# Patient Record
Sex: MALE | Race: WHITE | NOT HISPANIC OR LATINO
[De-identification: names, ages, dates, MRNs, and addresses within clinical notes are randomized per-mention and may not be internally consistent; named-entity substitution may affect disease eponyms.]

---

## 2022-01-01 ENCOUNTER — APPOINTMENT (OUTPATIENT)
Dept: INTERVENTIONAL RADIOLOGY/VASCULAR | Facility: CLINIC | Age: 67
End: 2022-01-01
Payer: MEDICARE

## 2022-01-01 ENCOUNTER — TRANSCRIPTION ENCOUNTER (OUTPATIENT)
Age: 67
End: 2022-01-01

## 2022-01-01 ENCOUNTER — RESULT REVIEW (OUTPATIENT)
Age: 67
End: 2022-01-01

## 2022-01-01 ENCOUNTER — APPOINTMENT (OUTPATIENT)
Dept: MRI IMAGING | Facility: CLINIC | Age: 67
End: 2022-01-01
Payer: MEDICARE

## 2022-01-01 ENCOUNTER — APPOINTMENT (OUTPATIENT)
Dept: NUCLEAR MEDICINE | Facility: IMAGING CENTER | Age: 67
End: 2022-01-01
Payer: MEDICARE

## 2022-01-01 ENCOUNTER — OUTPATIENT (OUTPATIENT)
Dept: OUTPATIENT SERVICES | Facility: HOSPITAL | Age: 67
LOS: 1 days | Discharge: HOME | End: 2022-01-01

## 2022-01-01 ENCOUNTER — NON-APPOINTMENT (OUTPATIENT)
Age: 67
End: 2022-01-01

## 2022-01-01 ENCOUNTER — OUTPATIENT (OUTPATIENT)
Dept: OUTPATIENT SERVICES | Facility: HOSPITAL | Age: 67
LOS: 1 days | End: 2022-01-01
Payer: MEDICARE

## 2022-01-01 ENCOUNTER — APPOINTMENT (OUTPATIENT)
Dept: HEMATOLOGY ONCOLOGY | Facility: CLINIC | Age: 67
End: 2022-01-01

## 2022-01-01 ENCOUNTER — APPOINTMENT (OUTPATIENT)
Dept: THORACIC SURGERY | Facility: CLINIC | Age: 67
End: 2022-01-01
Payer: MEDICARE

## 2022-01-01 ENCOUNTER — APPOINTMENT (OUTPATIENT)
Dept: MRI IMAGING | Facility: CLINIC | Age: 67
End: 2022-01-01

## 2022-01-01 ENCOUNTER — INPATIENT (INPATIENT)
Facility: HOSPITAL | Age: 67
LOS: 1 days | Discharge: HOME | End: 2022-07-20
Attending: INTERNAL MEDICINE | Admitting: INTERNAL MEDICINE

## 2022-01-01 ENCOUNTER — EMERGENCY (EMERGENCY)
Facility: HOSPITAL | Age: 67
LOS: 1 days | Discharge: HOME | End: 2022-01-01
Attending: EMERGENCY MEDICINE | Admitting: EMERGENCY MEDICINE
Payer: MEDICARE

## 2022-01-01 ENCOUNTER — APPOINTMENT (OUTPATIENT)
Dept: HEMATOLOGY ONCOLOGY | Facility: CLINIC | Age: 67
End: 2022-01-01
Payer: MEDICARE

## 2022-01-01 ENCOUNTER — INPATIENT (INPATIENT)
Facility: HOSPITAL | Age: 67
LOS: 6 days | Discharge: EXPIRED | End: 2022-10-18
Attending: STUDENT IN AN ORGANIZED HEALTH CARE EDUCATION/TRAINING PROGRAM | Admitting: STUDENT IN AN ORGANIZED HEALTH CARE EDUCATION/TRAINING PROGRAM

## 2022-01-01 VITALS
SYSTOLIC BLOOD PRESSURE: 104 MMHG | WEIGHT: 160 LBS | RESPIRATION RATE: 17 BRPM | BODY MASS INDEX: 28.35 KG/M2 | HEART RATE: 67 BPM | HEIGHT: 63 IN | DIASTOLIC BLOOD PRESSURE: 67 MMHG | OXYGEN SATURATION: 97 %

## 2022-01-01 VITALS
SYSTOLIC BLOOD PRESSURE: 148 MMHG | HEART RATE: 68 BPM | OXYGEN SATURATION: 96 % | DIASTOLIC BLOOD PRESSURE: 95 MMHG | RESPIRATION RATE: 18 BRPM | TEMPERATURE: 98 F

## 2022-01-01 VITALS
DIASTOLIC BLOOD PRESSURE: 70 MMHG | SYSTOLIC BLOOD PRESSURE: 111 MMHG | RESPIRATION RATE: 18 BRPM | OXYGEN SATURATION: 92 %

## 2022-01-01 VITALS
SYSTOLIC BLOOD PRESSURE: 111 MMHG | HEIGHT: 63 IN | TEMPERATURE: 99 F | OXYGEN SATURATION: 99 % | HEART RATE: 66 BPM | DIASTOLIC BLOOD PRESSURE: 68 MMHG | RESPIRATION RATE: 16 BRPM

## 2022-01-01 VITALS
OXYGEN SATURATION: 95 % | HEIGHT: 61.02 IN | SYSTOLIC BLOOD PRESSURE: 101 MMHG | HEART RATE: 55 BPM | TEMPERATURE: 98 F | DIASTOLIC BLOOD PRESSURE: 66 MMHG

## 2022-01-01 VITALS
HEART RATE: 102 BPM | HEIGHT: 63 IN | DIASTOLIC BLOOD PRESSURE: 80 MMHG | OXYGEN SATURATION: 97 % | WEIGHT: 160 LBS | BODY MASS INDEX: 28.35 KG/M2 | SYSTOLIC BLOOD PRESSURE: 124 MMHG

## 2022-01-01 VITALS
OXYGEN SATURATION: 94 % | RESPIRATION RATE: 16 BRPM | HEART RATE: 66 BPM | TEMPERATURE: 97.7 F | HEIGHT: 61.02 IN | DIASTOLIC BLOOD PRESSURE: 73 MMHG | WEIGHT: 155.87 LBS | BODY MASS INDEX: 29.43 KG/M2 | SYSTOLIC BLOOD PRESSURE: 124 MMHG

## 2022-01-01 VITALS
RESPIRATION RATE: 17 BRPM | OXYGEN SATURATION: 97 % | DIASTOLIC BLOOD PRESSURE: 82 MMHG | SYSTOLIC BLOOD PRESSURE: 133 MMHG | TEMPERATURE: 98 F | HEART RATE: 64 BPM

## 2022-01-01 VITALS
SYSTOLIC BLOOD PRESSURE: 127 MMHG | RESPIRATION RATE: 18 BRPM | OXYGEN SATURATION: 99 % | HEART RATE: 65 BPM | DIASTOLIC BLOOD PRESSURE: 64 MMHG | TEMPERATURE: 98 F

## 2022-01-01 DIAGNOSIS — R07.89 OTHER CHEST PAIN: ICD-10-CM

## 2022-01-01 DIAGNOSIS — M10.9 GOUT, UNSPECIFIED: ICD-10-CM

## 2022-01-01 DIAGNOSIS — I20.0 UNSTABLE ANGINA: ICD-10-CM

## 2022-01-01 DIAGNOSIS — F17.200 NICOTINE DEPENDENCE, UNSPECIFIED, UNCOMPLICATED: ICD-10-CM

## 2022-01-01 DIAGNOSIS — E87.1 HYPO-OSMOLALITY AND HYPONATREMIA: ICD-10-CM

## 2022-01-01 DIAGNOSIS — M89.9 DISORDER OF BONE, UNSPECIFIED: ICD-10-CM

## 2022-01-01 DIAGNOSIS — M54.9 DORSALGIA, UNSPECIFIED: ICD-10-CM

## 2022-01-01 DIAGNOSIS — R53.1 WEAKNESS: ICD-10-CM

## 2022-01-01 DIAGNOSIS — R55 SYNCOPE AND COLLAPSE: ICD-10-CM

## 2022-01-01 DIAGNOSIS — S24.119A: ICD-10-CM

## 2022-01-01 DIAGNOSIS — R52 PAIN, UNSPECIFIED: ICD-10-CM

## 2022-01-01 DIAGNOSIS — J44.9 CHRONIC OBSTRUCTIVE PULMONARY DISEASE, UNSPECIFIED: ICD-10-CM

## 2022-01-01 DIAGNOSIS — I25.10 ATHEROSCLEROTIC HEART DISEASE OF NATIVE CORONARY ARTERY WITHOUT ANGINA PECTORIS: ICD-10-CM

## 2022-01-01 DIAGNOSIS — C79.51 SECONDARY MALIGNANT NEOPLASM OF BONE: ICD-10-CM

## 2022-01-01 DIAGNOSIS — R39.198 OTHER DIFFICULTIES WITH MICTURITION: ICD-10-CM

## 2022-01-01 DIAGNOSIS — E03.9 HYPOTHYROIDISM, UNSPECIFIED: ICD-10-CM

## 2022-01-01 DIAGNOSIS — M54.6 PAIN IN THORACIC SPINE: ICD-10-CM

## 2022-01-01 DIAGNOSIS — Z51.5 ENCOUNTER FOR PALLIATIVE CARE: ICD-10-CM

## 2022-01-01 DIAGNOSIS — Z80.1 FAMILY HISTORY OF MALIGNANT NEOPLASM OF TRACHEA, BRONCHUS AND LUNG: ICD-10-CM

## 2022-01-01 DIAGNOSIS — Z29.9 ENCOUNTER FOR PROPHYLACTIC MEASURES, UNSPECIFIED: ICD-10-CM

## 2022-01-01 DIAGNOSIS — Z02.9 ENCOUNTER FOR ADMINISTRATIVE EXAMINATIONS, UNSPECIFIED: ICD-10-CM

## 2022-01-01 DIAGNOSIS — E87.5 HYPERKALEMIA: ICD-10-CM

## 2022-01-01 DIAGNOSIS — E11.9 TYPE 2 DIABETES MELLITUS WITHOUT COMPLICATIONS: ICD-10-CM

## 2022-01-01 DIAGNOSIS — C90.00 MULTIPLE MYELOMA NOT HAVING ACHIEVED REMISSION: ICD-10-CM

## 2022-01-01 DIAGNOSIS — Z00.8 ENCOUNTER FOR OTHER GENERAL EXAMINATION: ICD-10-CM

## 2022-01-01 DIAGNOSIS — Z87.891 PERSONAL HISTORY OF NICOTINE DEPENDENCE: ICD-10-CM
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
## 2022-01-01 DIAGNOSIS — R07.9 CHEST PAIN, UNSPECIFIED: ICD-10-CM

## 2022-01-01 DIAGNOSIS — F41.9 ANXIETY DISORDER, UNSPECIFIED: ICD-10-CM

## 2022-01-01 DIAGNOSIS — D64.9 ANEMIA, UNSPECIFIED: ICD-10-CM

## 2022-01-01 LAB
% ALBUMIN: 34 % — SIGNIFICANT CHANGE UP
% ALPHA 1: 3.3 % — SIGNIFICANT CHANGE UP
% ALPHA 2: 14.6 % — SIGNIFICANT CHANGE UP
% BETA: 15.1 % — SIGNIFICANT CHANGE UP
% GAMMA: 33 % — SIGNIFICANT CHANGE UP
% M SPIKE: 29.5 % — SIGNIFICANT CHANGE UP
A1C WITH ESTIMATED AVERAGE GLUCOSE RESULT: 6.4 % — HIGH (ref 4–5.6)
A1C WITH ESTIMATED AVERAGE GLUCOSE RESULT: 6.4 % — HIGH (ref 4–5.6)
A1C WITH ESTIMATED AVERAGE GLUCOSE RESULT: 6.5 % — HIGH (ref 4–5.6)
ALBUMIN MFR SERPL ELPH: 41.2 %
ALBUMIN SERPL ELPH-MCNC: 2.75 G/DL — LOW (ref 3.3–4.4)
ALBUMIN SERPL ELPH-MCNC: 2.8 G/DL — LOW (ref 3.3–5)
ALBUMIN SERPL ELPH-MCNC: 3 G/DL — LOW (ref 3.3–5)
ALBUMIN SERPL ELPH-MCNC: 3.1 G/DL — LOW (ref 3.3–5)
ALBUMIN SERPL ELPH-MCNC: 3.3 G/DL — SIGNIFICANT CHANGE UP (ref 3.3–5)
ALBUMIN SERPL ELPH-MCNC: 3.3 G/DL — SIGNIFICANT CHANGE UP (ref 3.3–5)
ALBUMIN SERPL ELPH-MCNC: 3.4 G/DL — SIGNIFICANT CHANGE UP (ref 3.3–5)
ALBUMIN SERPL ELPH-MCNC: 3.4 G/DL — SIGNIFICANT CHANGE UP (ref 3.3–5)
ALBUMIN SERPL ELPH-MCNC: 3.6 G/DL — SIGNIFICANT CHANGE UP (ref 3.3–5)
ALBUMIN SERPL ELPH-MCNC: 3.6 G/DL — SIGNIFICANT CHANGE UP (ref 3.3–5)
ALBUMIN SERPL ELPH-MCNC: 4 G/DL
ALBUMIN SERPL-MCNC: 3.5 G/DL
ALBUMIN/GLOB SERPL ELPH: 0.5 RATIO — SIGNIFICANT CHANGE UP
ALBUMIN/GLOB SERPL: 0.7 RATIO
ALP BLD-CCNC: 70 U/L
ALP SERPL-CCNC: 54 U/L — SIGNIFICANT CHANGE UP (ref 40–120)
ALP SERPL-CCNC: 55 U/L — SIGNIFICANT CHANGE UP (ref 40–120)
ALP SERPL-CCNC: 55 U/L — SIGNIFICANT CHANGE UP (ref 40–120)
ALP SERPL-CCNC: 57 U/L — SIGNIFICANT CHANGE UP (ref 40–120)
ALP SERPL-CCNC: 58 U/L — SIGNIFICANT CHANGE UP (ref 40–120)
ALP SERPL-CCNC: 58 U/L — SIGNIFICANT CHANGE UP (ref 40–120)
ALP SERPL-CCNC: 61 U/L — SIGNIFICANT CHANGE UP (ref 40–120)
ALP SERPL-CCNC: 64 U/L — SIGNIFICANT CHANGE UP (ref 40–120)
ALP SERPL-CCNC: 65 U/L — SIGNIFICANT CHANGE UP (ref 40–120)
ALPHA1 GLOB MFR SERPL ELPH: 3.6 %
ALPHA1 GLOB SERPL ELPH-MCNC: 0.27 G/DL — SIGNIFICANT CHANGE UP (ref 0.1–0.3)
ALPHA1 GLOB SERPL ELPH-MCNC: 0.3 G/DL
ALPHA2 GLOB MFR SERPL ELPH: 10.6 %
ALPHA2 GLOB SERPL ELPH-MCNC: 0.9 G/DL
ALPHA2 GLOB SERPL ELPH-MCNC: 1.2 G/DL — HIGH (ref 0.6–1)
ALT FLD-CCNC: 15 U/L — SIGNIFICANT CHANGE UP (ref 4–41)
ALT FLD-CCNC: 15 U/L — SIGNIFICANT CHANGE UP (ref 4–41)
ALT FLD-CCNC: 16 U/L — SIGNIFICANT CHANGE UP (ref 4–41)
ALT FLD-CCNC: 18 U/L — SIGNIFICANT CHANGE UP (ref 4–41)
ALT FLD-CCNC: 19 U/L — SIGNIFICANT CHANGE UP (ref 4–41)
ALT FLD-CCNC: 25 U/L — SIGNIFICANT CHANGE UP (ref 4–41)
ALT FLD-CCNC: 40 U/L — SIGNIFICANT CHANGE UP (ref 4–41)
ALT FLD-CCNC: 60 U/L — HIGH (ref 4–41)
ALT FLD-CCNC: 655 U/L — HIGH (ref 4–41)
ALT SERPL-CCNC: 24 U/L
ANION GAP SERPL CALC-SCNC: 10 MMOL/L — SIGNIFICANT CHANGE UP (ref 7–14)
ANION GAP SERPL CALC-SCNC: 10 MMOL/L — SIGNIFICANT CHANGE UP (ref 7–14)
ANION GAP SERPL CALC-SCNC: 11 MMOL/L — SIGNIFICANT CHANGE UP (ref 7–14)
ANION GAP SERPL CALC-SCNC: 12 MMOL/L — SIGNIFICANT CHANGE UP (ref 7–14)
ANION GAP SERPL CALC-SCNC: 12 MMOL/L — SIGNIFICANT CHANGE UP (ref 7–14)
ANION GAP SERPL CALC-SCNC: 13 MMOL/L — SIGNIFICANT CHANGE UP (ref 7–14)
ANION GAP SERPL CALC-SCNC: 14 MMOL/L — SIGNIFICANT CHANGE UP (ref 7–14)
ANION GAP SERPL CALC-SCNC: 17 MMOL/L
ANION GAP SERPL CALC-SCNC: 20 MMOL/L — HIGH (ref 7–14)
ANION GAP SERPL CALC-SCNC: 7 MMOL/L — SIGNIFICANT CHANGE UP (ref 7–14)
ANION GAP SERPL CALC-SCNC: 9 MMOL/L — SIGNIFICANT CHANGE UP (ref 7–14)
ANION GAP SERPL CALC-SCNC: 9 MMOL/L — SIGNIFICANT CHANGE UP (ref 7–14)
ANISOCYTOSIS BLD QL: SLIGHT — SIGNIFICANT CHANGE UP
APPEARANCE UR: CLEAR — SIGNIFICANT CHANGE UP
APPEARANCE UR: CLEAR — SIGNIFICANT CHANGE UP
APTT BLD: 160.6 SEC — SIGNIFICANT CHANGE UP (ref 27–36.3)
APTT BLD: 28.4 SEC — SIGNIFICANT CHANGE UP (ref 27–36.3)
APTT BLD: 29.2 SEC — SIGNIFICANT CHANGE UP (ref 27–36.3)
APTT BLD: 32.4 SEC
APTT BLD: 32.4 SEC — SIGNIFICANT CHANGE UP (ref 27–36.3)
AST SERPL-CCNC: 108 U/L — HIGH (ref 4–40)
AST SERPL-CCNC: 16 U/L — SIGNIFICANT CHANGE UP (ref 4–40)
AST SERPL-CCNC: 16 U/L — SIGNIFICANT CHANGE UP (ref 4–40)
AST SERPL-CCNC: 18 U/L — SIGNIFICANT CHANGE UP (ref 4–40)
AST SERPL-CCNC: 19 U/L — SIGNIFICANT CHANGE UP (ref 4–40)
AST SERPL-CCNC: 20 U/L — SIGNIFICANT CHANGE UP (ref 4–40)
AST SERPL-CCNC: 22 U/L — SIGNIFICANT CHANGE UP (ref 4–40)
AST SERPL-CCNC: 22 U/L — SIGNIFICANT CHANGE UP (ref 4–40)
AST SERPL-CCNC: 23 U/L
AST SERPL-CCNC: 28 U/L — SIGNIFICANT CHANGE UP (ref 4–40)
AST SERPL-CCNC: 516 U/L — HIGH (ref 4–40)
AST SERPL-CCNC: 58 U/L — HIGH (ref 4–40)
B PERT DNA SPEC QL NAA+PROBE: SIGNIFICANT CHANGE UP
B PERT+PARAPERT DNA PNL SPEC NAA+PROBE: SIGNIFICANT CHANGE UP
B-GLOBULIN MFR SERPL ELPH: 13.4 %
B-GLOBULIN SERPL ELPH-MCNC: 1.2 G/DL
B-GLOBULIN SERPL ELPH-MCNC: 1.22 G/DL — HIGH (ref 0.6–1.1)
B2 MICROGLOB SERPL-MCNC: 3.7 MG/L
BACTERIA # UR AUTO: NEGATIVE — SIGNIFICANT CHANGE UP
BACTERIA # UR AUTO: NEGATIVE — SIGNIFICANT CHANGE UP
BASE EXCESS BLDA CALC-SCNC: -9 MMOL/L — LOW (ref -2–3)
BASOPHILS # BLD AUTO: 0 K/UL — SIGNIFICANT CHANGE UP (ref 0–0.2)
BASOPHILS # BLD AUTO: 0.03 K/UL — SIGNIFICANT CHANGE UP (ref 0–0.2)
BASOPHILS # BLD AUTO: 0.04 K/UL — SIGNIFICANT CHANGE UP (ref 0–0.2)
BASOPHILS NFR BLD AUTO: 0 % — SIGNIFICANT CHANGE UP (ref 0–2)
BASOPHILS NFR BLD AUTO: 0.2 % — SIGNIFICANT CHANGE UP (ref 0–2)
BASOPHILS NFR BLD AUTO: 0.3 % — SIGNIFICANT CHANGE UP (ref 0–2)
BASOPHILS NFR BLD AUTO: 0.3 % — SIGNIFICANT CHANGE UP (ref 0–2)
BASOPHILS NFR BLD AUTO: 0.4 % — SIGNIFICANT CHANGE UP (ref 0–2)
BASOPHILS NFR BLD AUTO: 0.4 % — SIGNIFICANT CHANGE UP (ref 0–2)
BILIRUB SERPL-MCNC: 0.3 MG/DL
BILIRUB SERPL-MCNC: 0.3 MG/DL — SIGNIFICANT CHANGE UP (ref 0.2–1.2)
BILIRUB SERPL-MCNC: 0.4 MG/DL — SIGNIFICANT CHANGE UP (ref 0.2–1.2)
BILIRUB SERPL-MCNC: 0.5 MG/DL — SIGNIFICANT CHANGE UP (ref 0.2–1.2)
BILIRUB SERPL-MCNC: 0.6 MG/DL — SIGNIFICANT CHANGE UP (ref 0.2–1.2)
BILIRUB UR-MCNC: NEGATIVE — SIGNIFICANT CHANGE UP
BILIRUB UR-MCNC: NEGATIVE — SIGNIFICANT CHANGE UP
BLD GP AB SCN SERPL QL: NEGATIVE — SIGNIFICANT CHANGE UP
BORDETELLA PARAPERTUSSIS (RAPRVP): SIGNIFICANT CHANGE UP
BUN SERPL-MCNC: 19 MG/DL — SIGNIFICANT CHANGE UP (ref 7–23)
BUN SERPL-MCNC: 23 MG/DL — SIGNIFICANT CHANGE UP (ref 7–23)
BUN SERPL-MCNC: 30 MG/DL — HIGH (ref 7–23)
BUN SERPL-MCNC: 32 MG/DL
BUN SERPL-MCNC: 32 MG/DL — HIGH (ref 7–23)
BUN SERPL-MCNC: 37 MG/DL — HIGH (ref 7–23)
BUN SERPL-MCNC: 37 MG/DL — HIGH (ref 7–23)
BUN SERPL-MCNC: 39 MG/DL — HIGH (ref 7–23)
BUN SERPL-MCNC: 43 MG/DL — HIGH (ref 7–23)
BUN SERPL-MCNC: 43 MG/DL — HIGH (ref 7–23)
BUN SERPL-MCNC: 45 MG/DL — HIGH (ref 7–23)
BUN SERPL-MCNC: 47 MG/DL — HIGH (ref 7–23)
BUN SERPL-MCNC: 47 MG/DL — HIGH (ref 7–23)
C PNEUM DNA SPEC QL NAA+PROBE: SIGNIFICANT CHANGE UP
CALCIUM SERPL-MCNC: 8.2 MG/DL — LOW (ref 8.4–10.5)
CALCIUM SERPL-MCNC: 8.4 MG/DL — SIGNIFICANT CHANGE UP (ref 8.4–10.5)
CALCIUM SERPL-MCNC: 8.5 MG/DL — SIGNIFICANT CHANGE UP (ref 8.4–10.5)
CALCIUM SERPL-MCNC: 8.8 MG/DL — SIGNIFICANT CHANGE UP (ref 8.4–10.5)
CALCIUM SERPL-MCNC: 8.9 MG/DL — SIGNIFICANT CHANGE UP (ref 8.4–10.5)
CALCIUM SERPL-MCNC: 9.1 MG/DL — SIGNIFICANT CHANGE UP (ref 8.4–10.5)
CALCIUM SERPL-MCNC: 9.2 MG/DL — SIGNIFICANT CHANGE UP (ref 8.4–10.5)
CALCIUM SERPL-MCNC: 9.3 MG/DL — SIGNIFICANT CHANGE UP (ref 8.4–10.5)
CALCIUM SERPL-MCNC: 9.4 MG/DL — SIGNIFICANT CHANGE UP (ref 8.4–10.5)
CALCIUM SERPL-MCNC: 9.5 MG/DL — SIGNIFICANT CHANGE UP (ref 8.4–10.5)
CALCIUM SERPL-MCNC: 9.5 MG/DL — SIGNIFICANT CHANGE UP (ref 8.4–10.5)
CALCIUM SERPL-MCNC: 9.6 MG/DL — SIGNIFICANT CHANGE UP (ref 8.4–10.5)
CALCIUM SERPL-MCNC: 9.8 MG/DL
CHLORIDE SERPL-SCNC: 101 MMOL/L — SIGNIFICANT CHANGE UP (ref 98–107)
CHLORIDE SERPL-SCNC: 101 MMOL/L — SIGNIFICANT CHANGE UP (ref 98–107)
CHLORIDE SERPL-SCNC: 102 MMOL/L — SIGNIFICANT CHANGE UP (ref 98–107)
CHLORIDE SERPL-SCNC: 103 MMOL/L — SIGNIFICANT CHANGE UP (ref 98–107)
CHLORIDE SERPL-SCNC: 105 MMOL/L
CHLORIDE SERPL-SCNC: 87 MMOL/L — LOW (ref 98–107)
CHLORIDE SERPL-SCNC: 87 MMOL/L — LOW (ref 98–107)
CHLORIDE SERPL-SCNC: 89 MMOL/L — LOW (ref 98–107)
CHLORIDE SERPL-SCNC: 90 MMOL/L — LOW (ref 98–107)
CHLORIDE SERPL-SCNC: 90 MMOL/L — LOW (ref 98–107)
CHLORIDE SERPL-SCNC: 91 MMOL/L — LOW (ref 98–107)
CHLORIDE SERPL-SCNC: 94 MMOL/L — LOW (ref 98–107)
CHLORIDE SERPL-SCNC: 96 MMOL/L — LOW (ref 98–107)
CHLORIDE SERPL-SCNC: 96 MMOL/L — LOW (ref 98–107)
CHLORIDE SERPL-SCNC: 98 MMOL/L — SIGNIFICANT CHANGE UP (ref 98–107)
CHOLEST SERPL-MCNC: 109 MG/DL — SIGNIFICANT CHANGE UP
CHOLEST SERPL-MCNC: 169 MG/DL — SIGNIFICANT CHANGE UP
CHROM ANALY INTERPHASE BLD FISH-IMP: SIGNIFICANT CHANGE UP
CK MB BLD-MCNC: 3.6 % — HIGH (ref 0–2.5)
CK MB BLD-MCNC: 3.8 % — HIGH (ref 0–2.5)
CK MB CFR SERPL CALC: 11.2 NG/ML — HIGH
CK MB CFR SERPL CALC: 118.6 NG/ML — HIGH
CK MB CFR SERPL CALC: 7.1 NG/ML — HIGH
CK MB CFR SERPL CALC: 9.2 NG/ML — HIGH
CK SERPL-CCNC: 241 U/L — HIGH (ref 30–200)
CK SERPL-CCNC: 308 U/L — HIGH (ref 30–200)
CO2 BLDA-SCNC: 24 MMOL/L — SIGNIFICANT CHANGE UP (ref 19–24)
CO2 SERPL-SCNC: 20 MMOL/L — LOW (ref 22–31)
CO2 SERPL-SCNC: 22 MMOL/L
CO2 SERPL-SCNC: 22 MMOL/L — SIGNIFICANT CHANGE UP (ref 22–31)
CO2 SERPL-SCNC: 23 MMOL/L — SIGNIFICANT CHANGE UP (ref 22–31)
CO2 SERPL-SCNC: 23 MMOL/L — SIGNIFICANT CHANGE UP (ref 22–31)
CO2 SERPL-SCNC: 24 MMOL/L — SIGNIFICANT CHANGE UP (ref 22–31)
CO2 SERPL-SCNC: 25 MMOL/L — SIGNIFICANT CHANGE UP (ref 22–31)
CO2 SERPL-SCNC: 26 MMOL/L — SIGNIFICANT CHANGE UP (ref 22–31)
CO2 SERPL-SCNC: 27 MMOL/L — SIGNIFICANT CHANGE UP (ref 22–31)
CO2 SERPL-SCNC: 27 MMOL/L — SIGNIFICANT CHANGE UP (ref 22–31)
CO2 SERPL-SCNC: 28 MMOL/L — SIGNIFICANT CHANGE UP (ref 22–31)
COLOR SPEC: SIGNIFICANT CHANGE UP
COLOR SPEC: SIGNIFICANT CHANGE UP
CREAT SERPL-MCNC: 1.05 MG/DL — SIGNIFICANT CHANGE UP (ref 0.5–1.3)
CREAT SERPL-MCNC: 1.06 MG/DL — SIGNIFICANT CHANGE UP (ref 0.5–1.3)
CREAT SERPL-MCNC: 1.11 MG/DL — SIGNIFICANT CHANGE UP (ref 0.5–1.3)
CREAT SERPL-MCNC: 1.12 MG/DL — SIGNIFICANT CHANGE UP (ref 0.5–1.3)
CREAT SERPL-MCNC: 1.15 MG/DL — SIGNIFICANT CHANGE UP (ref 0.5–1.3)
CREAT SERPL-MCNC: 1.17 MG/DL — SIGNIFICANT CHANGE UP (ref 0.5–1.3)
CREAT SERPL-MCNC: 1.2 MG/DL — SIGNIFICANT CHANGE UP (ref 0.5–1.3)
CREAT SERPL-MCNC: 1.22 MG/DL — SIGNIFICANT CHANGE UP (ref 0.5–1.3)
CREAT SERPL-MCNC: 1.23 MG/DL — SIGNIFICANT CHANGE UP (ref 0.5–1.3)
CREAT SERPL-MCNC: 1.24 MG/DL — SIGNIFICANT CHANGE UP (ref 0.5–1.3)
CREAT SERPL-MCNC: 1.27 MG/DL — SIGNIFICANT CHANGE UP (ref 0.5–1.3)
CREAT SERPL-MCNC: 1.31 MG/DL
CREAT SERPL-MCNC: 1.32 MG/DL — HIGH (ref 0.5–1.3)
CREAT SERPL-MCNC: 1.41 MG/DL — HIGH (ref 0.5–1.3)
CREAT SERPL-MCNC: 1.42 MG/DL — HIGH (ref 0.5–1.3)
CULTURE RESULTS: SIGNIFICANT CHANGE UP
D DIMER BLD IA.RAPID-MCNC: 343 NG/ML DDU — HIGH
DEPRECATED KAPPA LC FREE/LAMBDA SER: 4.56 RATIO
DIFF PNL FLD: ABNORMAL
DIFF PNL FLD: ABNORMAL
EGFR: 53 ML/MIN/1.73M2 — LOW
EGFR: 54 ML/MIN/1.73M2 — LOW
EGFR: 58 ML/MIN/1.73M2 — LOW
EGFR: 59 ML/MIN/1.73M2
EGFR: 61 ML/MIN/1.73M2 — SIGNIFICANT CHANGE UP
EGFR: 63 ML/MIN/1.73M2 — SIGNIFICANT CHANGE UP
EGFR: 64 ML/MIN/1.73M2 — SIGNIFICANT CHANGE UP
EGFR: 64 ML/MIN/1.73M2 — SIGNIFICANT CHANGE UP
EGFR: 65 ML/MIN/1.73M2 — SIGNIFICANT CHANGE UP
EGFR: 67 ML/MIN/1.73M2 — SIGNIFICANT CHANGE UP
EGFR: 69 ML/MIN/1.73M2 — SIGNIFICANT CHANGE UP
EGFR: 71 ML/MIN/1.73M2 — SIGNIFICANT CHANGE UP
EGFR: 72 ML/MIN/1.73M2 — SIGNIFICANT CHANGE UP
EGFR: 76 ML/MIN/1.73M2 — SIGNIFICANT CHANGE UP
EGFR: 77 ML/MIN/1.73M2 — SIGNIFICANT CHANGE UP
EOSINOPHIL # BLD AUTO: 0 K/UL — SIGNIFICANT CHANGE UP (ref 0–0.5)
EOSINOPHIL # BLD AUTO: 0.18 K/UL — SIGNIFICANT CHANGE UP (ref 0–0.5)
EOSINOPHIL # BLD AUTO: 0.21 K/UL — SIGNIFICANT CHANGE UP (ref 0–0.5)
EOSINOPHIL # BLD AUTO: 0.28 K/UL — SIGNIFICANT CHANGE UP (ref 0–0.5)
EOSINOPHIL # BLD AUTO: 0.31 K/UL — SIGNIFICANT CHANGE UP (ref 0–0.5)
EOSINOPHIL # BLD AUTO: 0.31 K/UL — SIGNIFICANT CHANGE UP (ref 0–0.5)
EOSINOPHIL NFR BLD AUTO: 0 % — SIGNIFICANT CHANGE UP (ref 0–6)
EOSINOPHIL NFR BLD AUTO: 1.7 % — SIGNIFICANT CHANGE UP (ref 0–6)
EOSINOPHIL NFR BLD AUTO: 1.7 % — SIGNIFICANT CHANGE UP (ref 0–6)
EOSINOPHIL NFR BLD AUTO: 2.8 % — SIGNIFICANT CHANGE UP (ref 0–6)
EOSINOPHIL NFR BLD AUTO: 3.5 % — SIGNIFICANT CHANGE UP (ref 0–6)
EOSINOPHIL NFR BLD AUTO: 3.7 % — SIGNIFICANT CHANGE UP (ref 0–6)
EPI CELLS # UR: 0 /HPF — SIGNIFICANT CHANGE UP (ref 0–5)
EPI CELLS # UR: 0 /HPF — SIGNIFICANT CHANGE UP (ref 0–5)
ESTIMATED AVERAGE GLUCOSE: 137 — SIGNIFICANT CHANGE UP
ESTIMATED AVERAGE GLUCOSE: 137 — SIGNIFICANT CHANGE UP
ESTIMATED AVERAGE GLUCOSE: 140 — SIGNIFICANT CHANGE UP
FLUAV SUBTYP SPEC NAA+PROBE: SIGNIFICANT CHANGE UP
FLUBV RNA SPEC QL NAA+PROBE: SIGNIFICANT CHANGE UP
GAMMA GLOB FLD ELPH-MCNC: 2.7 G/DL
GAMMA GLOB MFR SERPL ELPH: 31.2 %
GAMMA GLOBULIN: 2.67 G/DL — HIGH (ref 0.7–1.7)
GAS PNL BLDA: SIGNIFICANT CHANGE UP
GIANT PLATELETS BLD QL SMEAR: PRESENT — SIGNIFICANT CHANGE UP
GLUCOSE BLDC GLUCOMTR-MCNC: 105 MG/DL — HIGH (ref 70–99)
GLUCOSE BLDC GLUCOMTR-MCNC: 131 MG/DL — HIGH (ref 70–99)
GLUCOSE BLDC GLUCOMTR-MCNC: 135 MG/DL — HIGH (ref 70–99)
GLUCOSE BLDC GLUCOMTR-MCNC: 141 MG/DL — HIGH (ref 70–99)
GLUCOSE BLDC GLUCOMTR-MCNC: 166 MG/DL — HIGH (ref 70–99)
GLUCOSE BLDC GLUCOMTR-MCNC: 169 MG/DL — HIGH (ref 70–99)
GLUCOSE BLDC GLUCOMTR-MCNC: 175 MG/DL — HIGH (ref 70–99)
GLUCOSE BLDC GLUCOMTR-MCNC: 177 MG/DL — HIGH (ref 70–99)
GLUCOSE BLDC GLUCOMTR-MCNC: 178 MG/DL — HIGH (ref 70–99)
GLUCOSE BLDC GLUCOMTR-MCNC: 188 MG/DL — HIGH (ref 70–99)
GLUCOSE BLDC GLUCOMTR-MCNC: 192 MG/DL — HIGH (ref 70–99)
GLUCOSE BLDC GLUCOMTR-MCNC: 195 MG/DL — HIGH (ref 70–99)
GLUCOSE BLDC GLUCOMTR-MCNC: 204 MG/DL — HIGH (ref 70–99)
GLUCOSE BLDC GLUCOMTR-MCNC: 205 MG/DL — HIGH (ref 70–99)
GLUCOSE BLDC GLUCOMTR-MCNC: 209 MG/DL — HIGH (ref 70–99)
GLUCOSE BLDC GLUCOMTR-MCNC: 210 MG/DL — HIGH (ref 70–99)
GLUCOSE BLDC GLUCOMTR-MCNC: 212 MG/DL — HIGH (ref 70–99)
GLUCOSE BLDC GLUCOMTR-MCNC: 232 MG/DL — HIGH (ref 70–99)
GLUCOSE BLDC GLUCOMTR-MCNC: 235 MG/DL — HIGH (ref 70–99)
GLUCOSE BLDC GLUCOMTR-MCNC: 277 MG/DL — HIGH (ref 70–99)
GLUCOSE BLDC GLUCOMTR-MCNC: 289 MG/DL — HIGH (ref 70–99)
GLUCOSE BLDC GLUCOMTR-MCNC: 304 MG/DL — HIGH (ref 70–99)
GLUCOSE BLDC GLUCOMTR-MCNC: 352 MG/DL — HIGH (ref 70–99)
GLUCOSE BLDC GLUCOMTR-MCNC: 94 MG/DL — SIGNIFICANT CHANGE UP (ref 70–99)
GLUCOSE BLDC GLUCOMTR-MCNC: 97 MG/DL — SIGNIFICANT CHANGE UP (ref 70–99)
GLUCOSE SERPL-MCNC: 100 MG/DL — HIGH (ref 70–99)
GLUCOSE SERPL-MCNC: 103 MG/DL — HIGH (ref 70–99)
GLUCOSE SERPL-MCNC: 107 MG/DL — HIGH (ref 70–99)
GLUCOSE SERPL-MCNC: 173 MG/DL — HIGH (ref 70–99)
GLUCOSE SERPL-MCNC: 173 MG/DL — HIGH (ref 70–99)
GLUCOSE SERPL-MCNC: 180 MG/DL — HIGH (ref 70–99)
GLUCOSE SERPL-MCNC: 181 MG/DL — HIGH (ref 70–99)
GLUCOSE SERPL-MCNC: 217 MG/DL — HIGH (ref 70–99)
GLUCOSE SERPL-MCNC: 228 MG/DL — HIGH (ref 70–99)
GLUCOSE SERPL-MCNC: 248 MG/DL — HIGH (ref 70–99)
GLUCOSE SERPL-MCNC: 279 MG/DL — HIGH (ref 70–99)
GLUCOSE SERPL-MCNC: 413 MG/DL — HIGH (ref 70–99)
GLUCOSE SERPL-MCNC: 89 MG/DL
GLUCOSE SERPL-MCNC: 89 MG/DL — SIGNIFICANT CHANGE UP (ref 70–99)
GLUCOSE SERPL-MCNC: 94 MG/DL — SIGNIFICANT CHANGE UP (ref 70–99)
GLUCOSE UR QL: NEGATIVE — SIGNIFICANT CHANGE UP
GLUCOSE UR QL: NEGATIVE — SIGNIFICANT CHANGE UP
HADV DNA SPEC QL NAA+PROBE: SIGNIFICANT CHANGE UP
HCO3 BLDA-SCNC: 22 MMOL/L — SIGNIFICANT CHANGE UP (ref 21–28)
HCOV 229E RNA SPEC QL NAA+PROBE: SIGNIFICANT CHANGE UP
HCOV HKU1 RNA SPEC QL NAA+PROBE: SIGNIFICANT CHANGE UP
HCOV NL63 RNA SPEC QL NAA+PROBE: SIGNIFICANT CHANGE UP
HCOV OC43 RNA SPEC QL NAA+PROBE: SIGNIFICANT CHANGE UP
HCT VFR BLD CALC: 40.8 % — SIGNIFICANT CHANGE UP (ref 39–50)
HCT VFR BLD CALC: 40.9 % — SIGNIFICANT CHANGE UP (ref 39–50)
HCT VFR BLD CALC: 41.5 % — SIGNIFICANT CHANGE UP (ref 39–50)
HCT VFR BLD CALC: 41.8 % — SIGNIFICANT CHANGE UP (ref 39–50)
HCT VFR BLD CALC: 41.8 % — SIGNIFICANT CHANGE UP (ref 39–50)
HCT VFR BLD CALC: 42.2 % — SIGNIFICANT CHANGE UP (ref 39–50)
HCT VFR BLD CALC: 43.2 % — SIGNIFICANT CHANGE UP (ref 39–50)
HCT VFR BLD CALC: 43.5 % — SIGNIFICANT CHANGE UP (ref 39–50)
HCT VFR BLD CALC: 43.7 % — SIGNIFICANT CHANGE UP (ref 39–50)
HCT VFR BLD CALC: 45.3 % — SIGNIFICANT CHANGE UP (ref 39–50)
HCT VFR BLD CALC: 45.6 % — SIGNIFICANT CHANGE UP (ref 39–50)
HCT VFR BLD CALC: 45.8 % — SIGNIFICANT CHANGE UP (ref 39–50)
HCT VFR BLD CALC: 46.9 % — SIGNIFICANT CHANGE UP (ref 39–50)
HCV AB S/CO SERPL IA: 0.06 S/CO — SIGNIFICANT CHANGE UP (ref 0–0.99)
HCV AB SERPL-IMP: SIGNIFICANT CHANGE UP
HDLC SERPL-MCNC: 40 MG/DL — LOW
HDLC SERPL-MCNC: 54 MG/DL — SIGNIFICANT CHANGE UP
HEMATOPATHOLOGY REPORT: SIGNIFICANT CHANGE UP
HGB BLD-MCNC: 13.3 G/DL — SIGNIFICANT CHANGE UP (ref 13–17)
HGB BLD-MCNC: 13.4 G/DL — SIGNIFICANT CHANGE UP (ref 13–17)
HGB BLD-MCNC: 13.5 G/DL — SIGNIFICANT CHANGE UP (ref 13–17)
HGB BLD-MCNC: 13.5 G/DL — SIGNIFICANT CHANGE UP (ref 13–17)
HGB BLD-MCNC: 13.6 G/DL — SIGNIFICANT CHANGE UP (ref 13–17)
HGB BLD-MCNC: 13.8 G/DL — SIGNIFICANT CHANGE UP (ref 13–17)
HGB BLD-MCNC: 14 G/DL — SIGNIFICANT CHANGE UP (ref 13–17)
HGB BLD-MCNC: 14.1 G/DL — SIGNIFICANT CHANGE UP (ref 13–17)
HGB BLD-MCNC: 14.5 G/DL — SIGNIFICANT CHANGE UP (ref 13–17)
HGB BLD-MCNC: 14.5 G/DL — SIGNIFICANT CHANGE UP (ref 13–17)
HGB BLD-MCNC: 14.7 G/DL — SIGNIFICANT CHANGE UP (ref 13–17)
HGB BLD-MCNC: 15.1 G/DL — SIGNIFICANT CHANGE UP (ref 13–17)
HGB BLD-MCNC: 15.3 G/DL — SIGNIFICANT CHANGE UP (ref 13–17)
HMPV RNA SPEC QL NAA+PROBE: SIGNIFICANT CHANGE UP
HPIV1 RNA SPEC QL NAA+PROBE: SIGNIFICANT CHANGE UP
HPIV2 RNA SPEC QL NAA+PROBE: SIGNIFICANT CHANGE UP
HPIV3 RNA SPEC QL NAA+PROBE: SIGNIFICANT CHANGE UP
HPIV4 RNA SPEC QL NAA+PROBE: SIGNIFICANT CHANGE UP
HYALINE CASTS # UR AUTO: 0 /LPF — SIGNIFICANT CHANGE UP (ref 0–7)
HYALINE CASTS # UR AUTO: 0 /LPF — SIGNIFICANT CHANGE UP (ref 0–7)
IANC: 14.75 K/UL — HIGH (ref 1.8–7.4)
IANC: 4.42 K/UL — SIGNIFICANT CHANGE UP (ref 1.8–7.4)
IANC: 4.61 K/UL — SIGNIFICANT CHANGE UP (ref 1.8–7.4)
IANC: 6.89 K/UL — SIGNIFICANT CHANGE UP (ref 1.5–8.5)
IANC: 7.98 K/UL — HIGH (ref 1.8–7.4)
IGA FLD-MCNC: 246 MG/DL — SIGNIFICANT CHANGE UP (ref 84–499)
IGA SER QL IEP: 427 MG/DL
IGG FLD-MCNC: 3208 MG/DL — HIGH (ref 610–1660)
IGG SER QL IEP: 2235 MG/DL
IGG SERPL-MCNC: 2684 MG/DL — HIGH (ref 603–1613)
IGG1 SER-MCNC: 62 MG/DL — LOW (ref 248–810)
IGG2 SER-MCNC: 92 MG/DL — LOW (ref 130–555)
IGG3 SER-MCNC: 32 MG/DL — SIGNIFICANT CHANGE UP (ref 15–102)
IGG4 SER-MCNC: 2840 MG/DL — HIGH (ref 2–96)
IGM SER QL IEP: 42 MG/DL
IGM SERPL-MCNC: 42 MG/DL — SIGNIFICANT CHANGE UP (ref 35–242)
IMM GRANULOCYTES NFR BLD AUTO: 0.1 % — SIGNIFICANT CHANGE UP (ref 0–1.5)
IMM GRANULOCYTES NFR BLD AUTO: 0.3 % — SIGNIFICANT CHANGE UP (ref 0–1.5)
IMM GRANULOCYTES NFR BLD AUTO: 0.3 % — SIGNIFICANT CHANGE UP (ref 0–1.5)
IMM GRANULOCYTES NFR BLD AUTO: 0.4 % — SIGNIFICANT CHANGE UP (ref 0–0.9)
IMM GRANULOCYTES NFR BLD AUTO: 0.4 % — SIGNIFICANT CHANGE UP (ref 0–1.5)
INR BLD: 1.05 RATIO — SIGNIFICANT CHANGE UP (ref 0.88–1.16)
INR BLD: 1.05 RATIO — SIGNIFICANT CHANGE UP (ref 0.88–1.16)
INR BLD: 1.16 RATIO — SIGNIFICANT CHANGE UP (ref 0.88–1.16)
INR PPP: 0.98 RATIO
INTERPRETATION SERPL IEP-IMP: NORMAL
KAPPA LC CSF-MCNC: 3.22 MG/DL
KAPPA LC SER QL IFE: 26.67 MG/DL — HIGH (ref 0.33–1.94)
KAPPA LC SER QL IFE: 27.95 MG/DL — HIGH (ref 0.33–1.94)
KAPPA LC SERPL-MCNC: 14.67 MG/DL
KAPPA/LAMBDA FREE LIGHT CHAIN RATIO, SERUM: 14.87 RATIO — HIGH (ref 0.26–1.65)
KAPPA/LAMBDA FREE LIGHT CHAIN RATIO, SERUM: 18.27 RATIO — HIGH (ref 0.26–1.65)
KETONES UR-MCNC: NEGATIVE — SIGNIFICANT CHANGE UP
KETONES UR-MCNC: NEGATIVE — SIGNIFICANT CHANGE UP
LAMBDA LC SER QL IFE: 1.46 MG/DL — SIGNIFICANT CHANGE UP (ref 0.57–2.63)
LAMBDA LC SER QL IFE: 1.88 MG/DL — SIGNIFICANT CHANGE UP (ref 0.57–2.63)
LDH SERPL L TO P-CCNC: 331 U/L — HIGH (ref 135–225)
LEUKOCYTE ESTERASE UR-ACNC: NEGATIVE — SIGNIFICANT CHANGE UP
LEUKOCYTE ESTERASE UR-ACNC: NEGATIVE — SIGNIFICANT CHANGE UP
LIDOCAIN IGE QN: 54 U/L — SIGNIFICANT CHANGE UP (ref 7–60)
LIPID PNL WITH DIRECT LDL SERPL: 50 MG/DL — SIGNIFICANT CHANGE UP
LIPID PNL WITH DIRECT LDL SERPL: 85 MG/DL — SIGNIFICANT CHANGE UP
LYMPHOCYTES # BLD AUTO: 1.87 K/UL — SIGNIFICANT CHANGE UP (ref 1–3.3)
LYMPHOCYTES # BLD AUTO: 2.64 K/UL — SIGNIFICANT CHANGE UP (ref 1–3.3)
LYMPHOCYTES # BLD AUTO: 2.83 K/UL — SIGNIFICANT CHANGE UP (ref 1–3.3)
LYMPHOCYTES # BLD AUTO: 25.3 % — SIGNIFICANT CHANGE UP (ref 13–44)
LYMPHOCYTES # BLD AUTO: 25.3 % — SIGNIFICANT CHANGE UP (ref 13–44)
LYMPHOCYTES # BLD AUTO: 3.05 K/UL — SIGNIFICANT CHANGE UP (ref 1–3.3)
LYMPHOCYTES # BLD AUTO: 3.15 K/UL — SIGNIFICANT CHANGE UP (ref 1–3.3)
LYMPHOCYTES # BLD AUTO: 3.19 K/UL — SIGNIFICANT CHANGE UP (ref 1–3.3)
LYMPHOCYTES # BLD AUTO: 31.8 % — SIGNIFICANT CHANGE UP (ref 13–44)
LYMPHOCYTES # BLD AUTO: 33.7 % — SIGNIFICANT CHANGE UP (ref 13–44)
LYMPHOCYTES # BLD AUTO: 36.5 % — SIGNIFICANT CHANGE UP (ref 13–44)
LYMPHOCYTES # BLD AUTO: 9.6 % — LOW (ref 13–44)
M PNEUMO DNA SPEC QL NAA+PROBE: SIGNIFICANT CHANGE UP
M PROTEIN MFR SERPL ELPH: 25.7 %
M PROTEIN SPEC IFE-MCNC: NORMAL
M-SPIKE: 2.4 G/DL — SIGNIFICANT CHANGE UP
MACROCYTES BLD QL: SLIGHT — SIGNIFICANT CHANGE UP
MAGNESIUM SERPL-MCNC: 1.4 MG/DL — LOW (ref 1.6–2.6)
MAGNESIUM SERPL-MCNC: 1.6 MG/DL — SIGNIFICANT CHANGE UP (ref 1.6–2.6)
MAGNESIUM SERPL-MCNC: 1.6 MG/DL — SIGNIFICANT CHANGE UP (ref 1.6–2.6)
MAGNESIUM SERPL-MCNC: 1.7 MG/DL
MAGNESIUM SERPL-MCNC: 1.7 MG/DL — SIGNIFICANT CHANGE UP (ref 1.6–2.6)
MAGNESIUM SERPL-MCNC: 1.7 MG/DL — SIGNIFICANT CHANGE UP (ref 1.6–2.6)
MAGNESIUM SERPL-MCNC: 1.8 MG/DL — SIGNIFICANT CHANGE UP (ref 1.6–2.6)
MAGNESIUM SERPL-MCNC: 1.9 MG/DL — SIGNIFICANT CHANGE UP (ref 1.6–2.6)
MAGNESIUM SERPL-MCNC: 2.1 MG/DL — SIGNIFICANT CHANGE UP (ref 1.6–2.6)
MANUAL SMEAR VERIFICATION: SIGNIFICANT CHANGE UP
MCHC RBC-ENTMCNC: 30.8 PG — SIGNIFICANT CHANGE UP (ref 27–34)
MCHC RBC-ENTMCNC: 31.1 PG — SIGNIFICANT CHANGE UP (ref 27–34)
MCHC RBC-ENTMCNC: 31.4 PG — SIGNIFICANT CHANGE UP (ref 27–34)
MCHC RBC-ENTMCNC: 31.5 PG — SIGNIFICANT CHANGE UP (ref 27–34)
MCHC RBC-ENTMCNC: 31.5 PG — SIGNIFICANT CHANGE UP (ref 27–34)
MCHC RBC-ENTMCNC: 31.7 PG — SIGNIFICANT CHANGE UP (ref 27–34)
MCHC RBC-ENTMCNC: 31.8 GM/DL — LOW (ref 32–36)
MCHC RBC-ENTMCNC: 31.9 PG — SIGNIFICANT CHANGE UP (ref 27–34)
MCHC RBC-ENTMCNC: 31.9 PG — SIGNIFICANT CHANGE UP (ref 27–34)
MCHC RBC-ENTMCNC: 32 GM/DL — SIGNIFICANT CHANGE UP (ref 32–36)
MCHC RBC-ENTMCNC: 32 GM/DL — SIGNIFICANT CHANGE UP (ref 32–36)
MCHC RBC-ENTMCNC: 32 PG — SIGNIFICANT CHANGE UP (ref 27–34)
MCHC RBC-ENTMCNC: 32.1 PG — SIGNIFICANT CHANGE UP (ref 27–34)
MCHC RBC-ENTMCNC: 32.2 G/DL — SIGNIFICANT CHANGE UP (ref 32–36)
MCHC RBC-ENTMCNC: 32.2 PG — SIGNIFICANT CHANGE UP (ref 27–34)
MCHC RBC-ENTMCNC: 32.2 PG — SIGNIFICANT CHANGE UP (ref 27–34)
MCHC RBC-ENTMCNC: 32.3 GM/DL — SIGNIFICANT CHANGE UP (ref 32–36)
MCHC RBC-ENTMCNC: 32.3 PG — SIGNIFICANT CHANGE UP (ref 27–34)
MCHC RBC-ENTMCNC: 32.5 GM/DL — SIGNIFICANT CHANGE UP (ref 32–36)
MCHC RBC-ENTMCNC: 32.6 GM/DL — SIGNIFICANT CHANGE UP (ref 32–36)
MCHC RBC-ENTMCNC: 33 GM/DL — SIGNIFICANT CHANGE UP (ref 32–36)
MCHC RBC-ENTMCNC: 33 GM/DL — SIGNIFICANT CHANGE UP (ref 32–36)
MCHC RBC-ENTMCNC: 33.3 GM/DL — SIGNIFICANT CHANGE UP (ref 32–36)
MCHC RBC-ENTMCNC: 33.3 GM/DL — SIGNIFICANT CHANGE UP (ref 32–36)
MCV RBC AUTO: 100.7 FL — HIGH (ref 80–100)
MCV RBC AUTO: 95.8 FL — SIGNIFICANT CHANGE UP (ref 80–100)
MCV RBC AUTO: 96 FL — SIGNIFICANT CHANGE UP (ref 80–100)
MCV RBC AUTO: 96.5 FL — SIGNIFICANT CHANGE UP (ref 80–100)
MCV RBC AUTO: 96.6 FL — SIGNIFICANT CHANGE UP (ref 80–100)
MCV RBC AUTO: 96.9 FL — SIGNIFICANT CHANGE UP (ref 80–100)
MCV RBC AUTO: 97.2 FL — SIGNIFICANT CHANGE UP (ref 80–100)
MCV RBC AUTO: 97.4 FL — SIGNIFICANT CHANGE UP (ref 80–100)
MCV RBC AUTO: 97.9 FL — SIGNIFICANT CHANGE UP (ref 80–100)
MCV RBC AUTO: 98.8 FL — SIGNIFICANT CHANGE UP (ref 80–100)
MCV RBC AUTO: 98.9 FL — SIGNIFICANT CHANGE UP (ref 80–100)
MONOCLON BAND OBS SERPL: 2.2 G/DL
MONOCYTES # BLD AUTO: 0 K/UL — SIGNIFICANT CHANGE UP (ref 0–0.9)
MONOCYTES # BLD AUTO: 0.6 K/UL — SIGNIFICANT CHANGE UP (ref 0–0.9)
MONOCYTES # BLD AUTO: 0.65 K/UL — SIGNIFICANT CHANGE UP (ref 0–0.9)
MONOCYTES # BLD AUTO: 0.73 K/UL — SIGNIFICANT CHANGE UP (ref 0–0.9)
MONOCYTES # BLD AUTO: 0.73 K/UL — SIGNIFICANT CHANGE UP (ref 0–0.9)
MONOCYTES # BLD AUTO: 0.76 K/UL — SIGNIFICANT CHANGE UP (ref 0–0.9)
MONOCYTES NFR BLD AUTO: 0 % — LOW (ref 2–14)
MONOCYTES NFR BLD AUTO: 6.1 % — SIGNIFICANT CHANGE UP (ref 2–14)
MONOCYTES NFR BLD AUTO: 6.2 % — SIGNIFICANT CHANGE UP (ref 2–14)
MONOCYTES NFR BLD AUTO: 7.1 % — SIGNIFICANT CHANGE UP (ref 2–14)
MONOCYTES NFR BLD AUTO: 7.4 % — SIGNIFICANT CHANGE UP (ref 2–14)
MONOCYTES NFR BLD AUTO: 8.7 % — SIGNIFICANT CHANGE UP (ref 2–14)
MYELOCYTES NFR BLD: 1.8 % — HIGH (ref 0–0)
NEUTROPHILS # BLD AUTO: 16.95 K/UL — HIGH (ref 1.8–7.4)
NEUTROPHILS # BLD AUTO: 4.42 K/UL — SIGNIFICANT CHANGE UP (ref 1.8–7.4)
NEUTROPHILS # BLD AUTO: 4.61 K/UL — SIGNIFICANT CHANGE UP (ref 1.8–7.4)
NEUTROPHILS # BLD AUTO: 5.71 K/UL — SIGNIFICANT CHANGE UP (ref 1.8–7.4)
NEUTROPHILS # BLD AUTO: 6.89 K/UL — SIGNIFICANT CHANGE UP (ref 1.8–7.4)
NEUTROPHILS # BLD AUTO: 7.98 K/UL — HIGH (ref 1.8–7.4)
NEUTROPHILS NFR BLD AUTO: 50.7 % — SIGNIFICANT CHANGE UP (ref 43–77)
NEUTROPHILS NFR BLD AUTO: 54.7 % — SIGNIFICANT CHANGE UP (ref 43–77)
NEUTROPHILS NFR BLD AUTO: 57.5 % — SIGNIFICANT CHANGE UP (ref 43–77)
NEUTROPHILS NFR BLD AUTO: 66.2 % — SIGNIFICANT CHANGE UP (ref 43–77)
NEUTROPHILS NFR BLD AUTO: 66.3 % — SIGNIFICANT CHANGE UP (ref 43–77)
NEUTROPHILS NFR BLD AUTO: 84.2 % — HIGH (ref 43–77)
NEUTS BAND # BLD: 2.6 % — SIGNIFICANT CHANGE UP (ref 0–6)
NITRITE UR-MCNC: NEGATIVE — SIGNIFICANT CHANGE UP
NITRITE UR-MCNC: NEGATIVE — SIGNIFICANT CHANGE UP
NON HDL CHOLESTEROL: 115 MG/DL — SIGNIFICANT CHANGE UP
NON HDL CHOLESTEROL: 69 MG/DL — SIGNIFICANT CHANGE UP
NRBC # BLD: 0 /100 WBCS — SIGNIFICANT CHANGE UP
NRBC # BLD: 0 /100 WBCS — SIGNIFICANT CHANGE UP (ref 0–0)
NRBC # FLD: 0 K/UL — SIGNIFICANT CHANGE UP
NRBC # FLD: 0 K/UL — SIGNIFICANT CHANGE UP (ref 0–0)
NT-PROBNP SERPL-SCNC: 677 PG/ML — HIGH
OSMOLALITY SERPL: 301 MOSM/KG — HIGH (ref 275–295)
OSMOLALITY SERPL: 303 MOSM/KG — HIGH (ref 275–295)
OSMOLALITY UR: 324 MOSM/KG — SIGNIFICANT CHANGE UP (ref 50–1200)
OVALOCYTES BLD QL SMEAR: SLIGHT — SIGNIFICANT CHANGE UP
PCO2 BLDA: 73 MMHG — CRITICAL HIGH (ref 35–48)
PH BLDA: 7.09 — CRITICAL LOW (ref 7.35–7.45)
PH UR: 6 — SIGNIFICANT CHANGE UP (ref 5–8)
PH UR: 6.5 — SIGNIFICANT CHANGE UP (ref 5–8)
PHOSPHATE SERPL-MCNC: 2.8 MG/DL — SIGNIFICANT CHANGE UP (ref 2.5–4.5)
PHOSPHATE SERPL-MCNC: 3 MG/DL
PHOSPHATE SERPL-MCNC: 3.2 MG/DL — SIGNIFICANT CHANGE UP (ref 2.5–4.5)
PHOSPHATE SERPL-MCNC: 3.5 MG/DL — SIGNIFICANT CHANGE UP (ref 2.5–4.5)
PHOSPHATE SERPL-MCNC: 3.7 MG/DL — SIGNIFICANT CHANGE UP (ref 2.5–4.5)
PHOSPHATE SERPL-MCNC: 3.9 MG/DL — SIGNIFICANT CHANGE UP (ref 2.5–4.5)
PHOSPHATE SERPL-MCNC: 4 MG/DL — SIGNIFICANT CHANGE UP (ref 2.5–4.5)
PHOSPHATE SERPL-MCNC: 4.1 MG/DL — SIGNIFICANT CHANGE UP (ref 2.5–4.5)
PHOSPHATE SERPL-MCNC: 4.4 MG/DL — SIGNIFICANT CHANGE UP (ref 2.5–4.5)
PHOSPHATE SERPL-MCNC: 4.5 MG/DL — SIGNIFICANT CHANGE UP (ref 2.5–4.5)
PHOSPHATE SERPL-MCNC: 7.3 MG/DL — HIGH (ref 2.5–4.5)
PLAT MORPH BLD: NORMAL — SIGNIFICANT CHANGE UP
PLATELET # BLD AUTO: 138 K/UL — LOW (ref 150–400)
PLATELET # BLD AUTO: 142 K/UL — LOW (ref 150–400)
PLATELET # BLD AUTO: 144 K/UL — LOW (ref 150–400)
PLATELET # BLD AUTO: 145 K/UL — LOW (ref 150–400)
PLATELET # BLD AUTO: 147 K/UL — LOW (ref 150–400)
PLATELET # BLD AUTO: 149 K/UL — LOW (ref 150–400)
PLATELET # BLD AUTO: 151 K/UL — SIGNIFICANT CHANGE UP (ref 150–400)
PLATELET # BLD AUTO: 155 K/UL — SIGNIFICANT CHANGE UP (ref 150–400)
PLATELET # BLD AUTO: 160 K/UL — SIGNIFICANT CHANGE UP (ref 150–400)
PLATELET # BLD AUTO: 161 K/UL — SIGNIFICANT CHANGE UP (ref 150–400)
PLATELET # BLD AUTO: 203 K/UL — SIGNIFICANT CHANGE UP (ref 150–400)
PLATELET COUNT - ESTIMATE: NORMAL — SIGNIFICANT CHANGE UP
PO2 BLDA: 64 MMHG — LOW (ref 83–108)
POIKILOCYTOSIS BLD QL AUTO: SLIGHT — SIGNIFICANT CHANGE UP
POTASSIUM SERPL-MCNC: 3.9 MMOL/L — SIGNIFICANT CHANGE UP (ref 3.5–5.3)
POTASSIUM SERPL-MCNC: 4 MMOL/L — SIGNIFICANT CHANGE UP (ref 3.5–5.3)
POTASSIUM SERPL-MCNC: 4.3 MMOL/L — SIGNIFICANT CHANGE UP (ref 3.5–5.3)
POTASSIUM SERPL-MCNC: 4.5 MMOL/L — SIGNIFICANT CHANGE UP (ref 3.5–5.3)
POTASSIUM SERPL-MCNC: 4.9 MMOL/L — SIGNIFICANT CHANGE UP (ref 3.5–5.3)
POTASSIUM SERPL-MCNC: 4.9 MMOL/L — SIGNIFICANT CHANGE UP (ref 3.5–5.3)
POTASSIUM SERPL-MCNC: 5 MMOL/L — SIGNIFICANT CHANGE UP (ref 3.5–5.3)
POTASSIUM SERPL-MCNC: 5 MMOL/L — SIGNIFICANT CHANGE UP (ref 3.5–5.3)
POTASSIUM SERPL-MCNC: 5.1 MMOL/L — SIGNIFICANT CHANGE UP (ref 3.5–5.3)
POTASSIUM SERPL-MCNC: 5.2 MMOL/L — SIGNIFICANT CHANGE UP (ref 3.5–5.3)
POTASSIUM SERPL-MCNC: 5.3 MMOL/L — SIGNIFICANT CHANGE UP (ref 3.5–5.3)
POTASSIUM SERPL-MCNC: 5.8 MMOL/L — HIGH (ref 3.5–5.3)
POTASSIUM SERPL-MCNC: 5.9 MMOL/L — HIGH (ref 3.5–5.3)
POTASSIUM SERPL-MCNC: 6.1 MMOL/L — HIGH (ref 3.5–5.3)
POTASSIUM SERPL-SCNC: 3.9 MMOL/L — SIGNIFICANT CHANGE UP (ref 3.5–5.3)
POTASSIUM SERPL-SCNC: 4 MMOL/L — SIGNIFICANT CHANGE UP (ref 3.5–5.3)
POTASSIUM SERPL-SCNC: 4.3 MMOL/L — SIGNIFICANT CHANGE UP (ref 3.5–5.3)
POTASSIUM SERPL-SCNC: 4.5 MMOL/L — SIGNIFICANT CHANGE UP (ref 3.5–5.3)
POTASSIUM SERPL-SCNC: 4.8 MMOL/L
POTASSIUM SERPL-SCNC: 4.9 MMOL/L — SIGNIFICANT CHANGE UP (ref 3.5–5.3)
POTASSIUM SERPL-SCNC: 4.9 MMOL/L — SIGNIFICANT CHANGE UP (ref 3.5–5.3)
POTASSIUM SERPL-SCNC: 5 MMOL/L — SIGNIFICANT CHANGE UP (ref 3.5–5.3)
POTASSIUM SERPL-SCNC: 5 MMOL/L — SIGNIFICANT CHANGE UP (ref 3.5–5.3)
POTASSIUM SERPL-SCNC: 5.1 MMOL/L — SIGNIFICANT CHANGE UP (ref 3.5–5.3)
POTASSIUM SERPL-SCNC: 5.2 MMOL/L — SIGNIFICANT CHANGE UP (ref 3.5–5.3)
POTASSIUM SERPL-SCNC: 5.3 MMOL/L — SIGNIFICANT CHANGE UP (ref 3.5–5.3)
POTASSIUM SERPL-SCNC: 5.8 MMOL/L — HIGH (ref 3.5–5.3)
POTASSIUM SERPL-SCNC: 5.9 MMOL/L — HIGH (ref 3.5–5.3)
POTASSIUM SERPL-SCNC: 6.1 MMOL/L — HIGH (ref 3.5–5.3)
PROT ?TM UR-MCNC: 13 MG/DL — SIGNIFICANT CHANGE UP
PROT PATTERN SERPL ELPH-IMP: SIGNIFICANT CHANGE UP
PROT SERPL-MCNC: 6.9 G/DL — SIGNIFICANT CHANGE UP (ref 6–8.3)
PROT SERPL-MCNC: 7 G/DL — SIGNIFICANT CHANGE UP (ref 6–8.3)
PROT SERPL-MCNC: 7.1 G/DL — SIGNIFICANT CHANGE UP (ref 6–8.3)
PROT SERPL-MCNC: 8 G/DL — SIGNIFICANT CHANGE UP (ref 6–8.3)
PROT SERPL-MCNC: 8.1 G/DL — SIGNIFICANT CHANGE UP
PROT SERPL-MCNC: 8.1 G/DL — SIGNIFICANT CHANGE UP (ref 6–8.3)
PROT SERPL-MCNC: 8.4 G/DL — HIGH (ref 6–8.3)
PROT SERPL-MCNC: 8.6 G/DL
PROT SERPL-MCNC: 8.6 G/DL — HIGH (ref 6–8.3)
PROT SERPL-MCNC: 8.7 G/DL — HIGH (ref 6–8.3)
PROT SERPL-MCNC: 9 G/DL — HIGH (ref 6–8.3)
PROT SERPL-MCNC: 9.5 G/DL — HIGH (ref 6–8.3)
PROT UR-MCNC: ABNORMAL
PROT UR-MCNC: NEGATIVE — SIGNIFICANT CHANGE UP
PROTHROM AB SERPL-ACNC: 12.2 SEC — SIGNIFICANT CHANGE UP (ref 10.5–13.4)
PROTHROM AB SERPL-ACNC: 12.2 SEC — SIGNIFICANT CHANGE UP (ref 10.5–13.4)
PROTHROM AB SERPL-ACNC: 13.5 SEC — HIGH (ref 10.5–13.4)
PSA FLD-MCNC: 0.16 NG/ML — SIGNIFICANT CHANGE UP (ref 0–4)
PT BLD: 11.5 SEC
RAPID RVP RESULT: SIGNIFICANT CHANGE UP
RBC # BLD: 4.13 M/UL — LOW (ref 4.2–5.8)
RBC # BLD: 4.19 M/UL — LOW (ref 4.2–5.8)
RBC # BLD: 4.22 M/UL — SIGNIFICANT CHANGE UP (ref 4.2–5.8)
RBC # BLD: 4.26 M/UL — SIGNIFICANT CHANGE UP (ref 4.2–5.8)
RBC # BLD: 4.3 M/UL — SIGNIFICANT CHANGE UP (ref 4.2–5.8)
RBC # BLD: 4.3 M/UL — SIGNIFICANT CHANGE UP (ref 4.2–5.8)
RBC # BLD: 4.47 M/UL — SIGNIFICANT CHANGE UP (ref 4.2–5.8)
RBC # BLD: 4.54 M/UL — SIGNIFICANT CHANGE UP (ref 4.2–5.8)
RBC # BLD: 4.55 M/UL — SIGNIFICANT CHANGE UP (ref 4.2–5.8)
RBC # BLD: 4.61 M/UL — SIGNIFICANT CHANGE UP (ref 4.2–5.8)
RBC # BLD: 4.66 M/UL — SIGNIFICANT CHANGE UP (ref 4.2–5.8)
RBC # BLD: 4.68 M/UL — SIGNIFICANT CHANGE UP (ref 4.2–5.8)
RBC # BLD: 4.86 M/UL — SIGNIFICANT CHANGE UP (ref 4.2–5.8)
RBC # FLD: 14.3 % — SIGNIFICANT CHANGE UP (ref 10.3–14.5)
RBC # FLD: 14.4 % — SIGNIFICANT CHANGE UP (ref 10.3–14.5)
RBC # FLD: 14.4 % — SIGNIFICANT CHANGE UP (ref 10.3–14.5)
RBC # FLD: 14.5 % — SIGNIFICANT CHANGE UP (ref 10.3–14.5)
RBC # FLD: 14.5 % — SIGNIFICANT CHANGE UP (ref 10.3–14.5)
RBC # FLD: 14.6 % — HIGH (ref 10.3–14.5)
RBC # FLD: 14.6 % — HIGH (ref 10.3–14.5)
RBC # FLD: 14.9 % — HIGH (ref 10.3–14.5)
RBC # FLD: 15 % — HIGH (ref 10.3–14.5)
RBC # FLD: 15.3 % — HIGH (ref 10.3–14.5)
RBC # FLD: 15.3 % — HIGH (ref 10.3–14.5)
RBC # FLD: 15.6 % — HIGH (ref 10.3–14.5)
RBC # FLD: 15.9 % — HIGH (ref 10.3–14.5)
RBC BLD AUTO: ABNORMAL
RBC CASTS # UR COMP ASSIST: 2 /HPF — SIGNIFICANT CHANGE UP (ref 0–4)
RBC CASTS # UR COMP ASSIST: 8 /HPF — HIGH (ref 0–4)
RH IG SCN BLD-IMP: POSITIVE — SIGNIFICANT CHANGE UP
RSV RNA SPEC QL NAA+PROBE: SIGNIFICANT CHANGE UP
RV+EV RNA SPEC QL NAA+PROBE: SIGNIFICANT CHANGE UP
SAO2 % BLDA: 86.7 % — LOW (ref 94–98)
SARS-COV-2 RNA SPEC QL NAA+PROBE: SIGNIFICANT CHANGE UP
SMUDGE CELLS # BLD: PRESENT — SIGNIFICANT CHANGE UP
SODIUM SERPL-SCNC: 124 MMOL/L — LOW (ref 135–145)
SODIUM SERPL-SCNC: 125 MMOL/L — LOW (ref 135–145)
SODIUM SERPL-SCNC: 127 MMOL/L — LOW (ref 135–145)
SODIUM SERPL-SCNC: 128 MMOL/L — LOW (ref 135–145)
SODIUM SERPL-SCNC: 128 MMOL/L — LOW (ref 135–145)
SODIUM SERPL-SCNC: 129 MMOL/L — LOW (ref 135–145)
SODIUM SERPL-SCNC: 130 MMOL/L — LOW (ref 135–145)
SODIUM SERPL-SCNC: 132 MMOL/L — LOW (ref 135–145)
SODIUM SERPL-SCNC: 133 MMOL/L — LOW (ref 135–145)
SODIUM SERPL-SCNC: 133 MMOL/L — LOW (ref 135–145)
SODIUM SERPL-SCNC: 135 MMOL/L — SIGNIFICANT CHANGE UP (ref 135–145)
SODIUM SERPL-SCNC: 136 MMOL/L — SIGNIFICANT CHANGE UP (ref 135–145)
SODIUM SERPL-SCNC: 137 MMOL/L — SIGNIFICANT CHANGE UP (ref 135–145)
SODIUM SERPL-SCNC: 139 MMOL/L — SIGNIFICANT CHANGE UP (ref 135–145)
SODIUM SERPL-SCNC: 144 MMOL/L
SODIUM UR-SCNC: 40 MMOL/L — SIGNIFICANT CHANGE UP
SP GR SPEC: 1.01 — SIGNIFICANT CHANGE UP (ref 1–1.05)
SP GR SPEC: 1.03 — SIGNIFICANT CHANGE UP (ref 1–1.05)
SPECIMEN SOURCE: SIGNIFICANT CHANGE UP
TM INTERPRETATION: SIGNIFICANT CHANGE UP
TRIGL SERPL-MCNC: 152 MG/DL — HIGH
TRIGL SERPL-MCNC: 95 MG/DL — SIGNIFICANT CHANGE UP
TROPONIN T, HIGH SENSITIVITY RESULT: 56 NG/L — CRITICAL HIGH
TROPONIN T, HIGH SENSITIVITY RESULT: 63 NG/L — CRITICAL HIGH
TROPONIN T, HIGH SENSITIVITY RESULT: 65 NG/L — CRITICAL HIGH
TROPONIN T, HIGH SENSITIVITY RESULT: 67 NG/L — CRITICAL HIGH
TROPONIN T, HIGH SENSITIVITY RESULT: 68 NG/L — CRITICAL HIGH
TROPONIN T, HIGH SENSITIVITY RESULT: 71 NG/L — CRITICAL HIGH
TROPONIN T, HIGH SENSITIVITY RESULT: 789 NG/L — CRITICAL HIGH
URATE SERPL-MCNC: 4.9 MG/DL — SIGNIFICANT CHANGE UP (ref 3.4–8.8)
URATE SERPL-MCNC: 5.5 MG/DL — SIGNIFICANT CHANGE UP (ref 3.4–8.8)
UROBILINOGEN FLD QL: SIGNIFICANT CHANGE UP
UROBILINOGEN FLD QL: SIGNIFICANT CHANGE UP
VARIANT LYMPHS # BLD: 1.8 % — SIGNIFICANT CHANGE UP (ref 0–6)
WBC # BLD: 10.42 K/UL — SIGNIFICANT CHANGE UP (ref 3.8–10.5)
WBC # BLD: 10.67 K/UL — HIGH (ref 3.8–10.5)
WBC # BLD: 11.14 K/UL — HIGH (ref 3.8–10.5)
WBC # BLD: 12.05 K/UL — HIGH (ref 3.8–10.5)
WBC # BLD: 12.73 K/UL — HIGH (ref 3.8–10.5)
WBC # BLD: 13.14 K/UL — HIGH (ref 3.8–10.5)
WBC # BLD: 13.59 K/UL — HIGH (ref 3.8–10.5)
WBC # BLD: 19.53 K/UL — HIGH (ref 3.8–10.5)
WBC # BLD: 8.41 K/UL — SIGNIFICANT CHANGE UP (ref 3.8–10.5)
WBC # BLD: 8.49 K/UL — SIGNIFICANT CHANGE UP (ref 3.8–10.5)
WBC # BLD: 8.74 K/UL — SIGNIFICANT CHANGE UP (ref 3.8–10.5)
WBC # BLD: 9.41 K/UL — SIGNIFICANT CHANGE UP (ref 3.8–10.5)
WBC # BLD: 9.92 K/UL — SIGNIFICANT CHANGE UP (ref 3.8–10.5)
WBC # FLD AUTO: 10.42 K/UL — SIGNIFICANT CHANGE UP (ref 3.8–10.5)
WBC # FLD AUTO: 10.67 K/UL — HIGH (ref 3.8–10.5)
WBC # FLD AUTO: 11.14 K/UL — HIGH (ref 3.8–10.5)
WBC # FLD AUTO: 12.05 K/UL — HIGH (ref 3.8–10.5)
WBC # FLD AUTO: 12.73 K/UL — HIGH (ref 3.8–10.5)
WBC # FLD AUTO: 13.14 K/UL — HIGH (ref 3.8–10.5)
WBC # FLD AUTO: 13.59 K/UL — HIGH (ref 3.8–10.5)
WBC # FLD AUTO: 19.53 K/UL — HIGH (ref 3.8–10.5)
WBC # FLD AUTO: 8.41 K/UL — SIGNIFICANT CHANGE UP (ref 3.8–10.5)
WBC # FLD AUTO: 8.49 K/UL — SIGNIFICANT CHANGE UP (ref 3.8–10.5)
WBC # FLD AUTO: 8.74 K/UL — SIGNIFICANT CHANGE UP (ref 3.8–10.5)
WBC # FLD AUTO: 9.41 K/UL — SIGNIFICANT CHANGE UP (ref 3.8–10.5)
WBC # FLD AUTO: 9.92 K/UL — SIGNIFICANT CHANGE UP (ref 3.8–10.5)
WBC UR QL: 1 /HPF — SIGNIFICANT CHANGE UP (ref 0–5)
WBC UR QL: 3 /HPF — SIGNIFICANT CHANGE UP (ref 0–5)

## 2022-01-01 PROCEDURE — 71260 CT THORAX DX C+: CPT | Mod: 26,MA

## 2022-01-01 PROCEDURE — 88365 INSITU HYBRIDIZATION (FISH): CPT | Mod: 26,59

## 2022-01-01 PROCEDURE — 99214 OFFICE O/P EST MOD 30 MIN: CPT

## 2022-01-01 PROCEDURE — A9585: CPT

## 2022-01-01 PROCEDURE — 99233 SBSQ HOSP IP/OBS HIGH 50: CPT | Mod: GC

## 2022-01-01 PROCEDURE — 86335 IMMUNFIX E-PHORSIS/URINE/CSF: CPT | Mod: 26

## 2022-01-01 PROCEDURE — 84166 PROTEIN E-PHORESIS/URINE/CSF: CPT | Mod: 26

## 2022-01-01 PROCEDURE — 72146 MRI CHEST SPINE W/O DYE: CPT | Mod: 26

## 2022-01-01 PROCEDURE — 88189 FLOWCYTOMETRY/READ 16 & >: CPT

## 2022-01-01 PROCEDURE — 88341 IMHCHEM/IMCYTCHM EA ADD ANTB: CPT | Mod: 26,59

## 2022-01-01 PROCEDURE — 88364 INSITU HYBRIDIZATION (FISH): CPT | Mod: 26

## 2022-01-01 PROCEDURE — 38222 DX BONE MARROW BX & ASPIR: CPT | Mod: RT

## 2022-01-01 PROCEDURE — 72141 MRI NECK SPINE W/O DYE: CPT | Mod: 26

## 2022-01-01 PROCEDURE — 99236 HOSP IP/OBS SAME DATE HI 85: CPT

## 2022-01-01 PROCEDURE — 72125 CT NECK SPINE W/O DYE: CPT | Mod: 26,MA

## 2022-01-01 PROCEDURE — 72157 MRI CHEST SPINE W/O & W/DYE: CPT | Mod: 26

## 2022-01-01 PROCEDURE — A9552: CPT

## 2022-01-01 PROCEDURE — 72157 MRI CHEST SPINE W/O & W/DYE: CPT | Mod: MG

## 2022-01-01 PROCEDURE — G1004: CPT

## 2022-01-01 PROCEDURE — 71275 CT ANGIOGRAPHY CHEST: CPT | Mod: 26,MA

## 2022-01-01 PROCEDURE — 70553 MRI BRAIN STEM W/O & W/DYE: CPT | Mod: 26,MG

## 2022-01-01 PROCEDURE — 93970 EXTREMITY STUDY: CPT | Mod: 26

## 2022-01-01 PROCEDURE — 70553 MRI BRAIN STEM W/O & W/DYE: CPT | Mod: MG

## 2022-01-01 PROCEDURE — 78815 PET IMAGE W/CT SKULL-THIGH: CPT | Mod: 26,PI,MH

## 2022-01-01 PROCEDURE — 74174 CTA ABD&PLVS W/CONTRAST: CPT | Mod: 26,MA

## 2022-01-01 PROCEDURE — 99291 CRITICAL CARE FIRST HOUR: CPT

## 2022-01-01 PROCEDURE — 99223 1ST HOSP IP/OBS HIGH 75: CPT | Mod: GC

## 2022-01-01 PROCEDURE — 93306 TTE W/DOPPLER COMPLETE: CPT | Mod: 26

## 2022-01-01 PROCEDURE — 88367 INSITU HYBRIDIZATION AUTO: CPT | Mod: 26

## 2022-01-01 PROCEDURE — 88313 SPECIAL STAINS GROUP 2: CPT | Mod: 26

## 2022-01-01 PROCEDURE — 99204 OFFICE O/P NEW MOD 45 MIN: CPT

## 2022-01-01 PROCEDURE — 99223 1ST HOSP IP/OBS HIGH 75: CPT

## 2022-01-01 PROCEDURE — 71046 X-RAY EXAM CHEST 2 VIEWS: CPT | Mod: 26

## 2022-01-01 PROCEDURE — 99233 SBSQ HOSP IP/OBS HIGH 50: CPT

## 2022-01-01 PROCEDURE — 93971 EXTREMITY STUDY: CPT | Mod: 26,LT

## 2022-01-01 PROCEDURE — 88342 IMHCHEM/IMCYTCHM 1ST ANTB: CPT | Mod: 26,59

## 2022-01-01 PROCEDURE — 99285 EMERGENCY DEPT VISIT HI MDM: CPT | Mod: GC

## 2022-01-01 PROCEDURE — 99285 EMERGENCY DEPT VISIT HI MDM: CPT | Mod: CS,25,GC

## 2022-01-01 PROCEDURE — 88360 TUMOR IMMUNOHISTOCHEM/MANUAL: CPT | Mod: 26

## 2022-01-01 PROCEDURE — 99205 OFFICE O/P NEW HI 60 MIN: CPT

## 2022-01-01 PROCEDURE — 78815 PET IMAGE W/CT SKULL-THIGH: CPT

## 2022-01-01 PROCEDURE — 99284 EMERGENCY DEPT VISIT MOD MDM: CPT | Mod: GC

## 2022-01-01 PROCEDURE — 86334 IMMUNOFIX E-PHORESIS SERUM: CPT | Mod: 26

## 2022-01-01 PROCEDURE — 93010 ELECTROCARDIOGRAM REPORT: CPT

## 2022-01-01 PROCEDURE — 84165 PROTEIN E-PHORESIS SERUM: CPT | Mod: 26

## 2022-01-01 PROCEDURE — 72157 MRI CHEST SPINE W/O & W/DYE: CPT | Mod: 26,MG

## 2022-01-01 PROCEDURE — 74176 CT ABD & PELVIS W/O CONTRAST: CPT | Mod: 26,GC,MA

## 2022-01-01 PROCEDURE — 88305 TISSUE EXAM BY PATHOLOGIST: CPT | Mod: 26

## 2022-01-01 PROCEDURE — 70450 CT HEAD/BRAIN W/O DYE: CPT | Mod: 26,MA

## 2022-01-01 PROCEDURE — 85097 BONE MARROW INTERPRETATION: CPT

## 2022-01-01 RX ORDER — DEXTROSE 50 % IN WATER 50 %
25 SYRINGE (ML) INTRAVENOUS ONCE
Refills: 0 | Status: DISCONTINUED | OUTPATIENT
Start: 2022-01-01 | End: 2022-01-01

## 2022-01-01 RX ORDER — ALBUTEROL 90 MCG
90 AEROSOL (GRAM) INHALATION
Refills: 0 | Status: DISCONTINUED | COMMUNITY
End: 2022-01-01

## 2022-01-01 RX ORDER — NICOTINE POLACRILEX 2 MG
1 GUM BUCCAL DAILY
Refills: 0 | Status: DISCONTINUED | OUTPATIENT
Start: 2022-01-01 | End: 2022-01-01

## 2022-01-01 RX ORDER — IPRATROPIUM/ALBUTEROL SULFATE 18-103MCG
3 AEROSOL WITH ADAPTER (GRAM) INHALATION EVERY 8 HOURS
Refills: 0 | Status: DISCONTINUED | OUTPATIENT
Start: 2022-01-01 | End: 2022-01-01

## 2022-01-01 RX ORDER — METOPROLOL TARTRATE 50 MG
12.5 TABLET ORAL DAILY
Refills: 0 | Status: DISCONTINUED | OUTPATIENT
Start: 2022-01-01 | End: 2022-01-01

## 2022-01-01 RX ORDER — ACETAMINOPHEN 500 MG
650 TABLET ORAL EVERY 6 HOURS
Refills: 0 | Status: DISCONTINUED | OUTPATIENT
Start: 2022-01-01 | End: 2022-01-01

## 2022-01-01 RX ORDER — BUPROPION HYDROCHLORIDE 150 MG/1
150 TABLET, EXTENDED RELEASE ORAL DAILY
Refills: 0 | Status: DISCONTINUED | OUTPATIENT
Start: 2022-01-01 | End: 2022-01-01

## 2022-01-01 RX ORDER — GABAPENTIN 400 MG/1
300 CAPSULE ORAL AT BEDTIME
Refills: 0 | Status: DISCONTINUED | OUTPATIENT
Start: 2022-01-01 | End: 2022-01-01

## 2022-01-01 RX ORDER — INSULIN LISPRO 100/ML
VIAL (ML) SUBCUTANEOUS
Refills: 0 | Status: DISCONTINUED | OUTPATIENT
Start: 2022-01-01 | End: 2022-01-01

## 2022-01-01 RX ORDER — HYDROMORPHONE HYDROCHLORIDE 2 MG/ML
2 INJECTION INTRAMUSCULAR; INTRAVENOUS; SUBCUTANEOUS EVERY 4 HOURS
Refills: 0 | Status: DISCONTINUED | OUTPATIENT
Start: 2022-01-01 | End: 2022-01-01

## 2022-01-01 RX ORDER — IBUPROFEN 200 MG
600 TABLET ORAL ONCE
Refills: 0 | Status: COMPLETED | OUTPATIENT
Start: 2022-01-01 | End: 2022-01-01

## 2022-01-01 RX ORDER — ACETAMINOPHEN 500 MG
975 TABLET ORAL EVERY 6 HOURS
Refills: 0 | Status: DISCONTINUED | OUTPATIENT
Start: 2022-01-01 | End: 2022-01-01

## 2022-01-01 RX ORDER — FLUTICASONE FUROATE AND VILANTEROL TRIFENATATE 100; 25 UG/1; UG/1
1 POWDER RESPIRATORY (INHALATION)
Qty: 0 | Refills: 0 | DISCHARGE

## 2022-01-01 RX ORDER — LIDOCAINE 4 G/100G
1 CREAM TOPICAL DAILY
Refills: 0 | Status: DISCONTINUED | OUTPATIENT
Start: 2022-01-01 | End: 2022-01-01

## 2022-01-01 RX ORDER — METOPROLOL TARTRATE 50 MG
0.5 TABLET ORAL
Qty: 0 | Refills: 0 | DISCHARGE

## 2022-01-01 RX ORDER — IPRATROPIUM/ALBUTEROL SULFATE 18-103MCG
3 AEROSOL WITH ADAPTER (GRAM) INHALATION
Qty: 0 | Refills: 0 | DISCHARGE

## 2022-01-01 RX ORDER — ASPIRIN/CALCIUM CARB/MAGNESIUM 324 MG
1 TABLET ORAL
Qty: 30 | Refills: 0
Start: 2022-01-01 | End: 2022-01-01

## 2022-01-01 RX ORDER — ALBUTEROL 90 UG/1
2 AEROSOL, METERED ORAL EVERY 6 HOURS
Refills: 0 | Status: DISCONTINUED | OUTPATIENT
Start: 2022-01-01 | End: 2022-01-01

## 2022-01-01 RX ORDER — INSULIN GLARGINE 100 [IU]/ML
8 INJECTION, SOLUTION SUBCUTANEOUS AT BEDTIME
Refills: 0 | Status: DISCONTINUED | OUTPATIENT
Start: 2022-01-01 | End: 2022-01-01

## 2022-01-01 RX ORDER — FLUTICASONE PROPIONATE 50 MCG
1 SPRAY, SUSPENSION NASAL
Refills: 0 | Status: DISCONTINUED | OUTPATIENT
Start: 2022-01-01 | End: 2022-01-01

## 2022-01-01 RX ORDER — OXYCODONE HYDROCHLORIDE 5 MG/1
2.5 TABLET ORAL EVERY 6 HOURS
Refills: 0 | Status: DISCONTINUED | OUTPATIENT
Start: 2022-01-01 | End: 2022-01-01

## 2022-01-01 RX ORDER — ALLOPURINOL 300 MG
100 TABLET ORAL DAILY
Refills: 0 | Status: DISCONTINUED | OUTPATIENT
Start: 2022-01-01 | End: 2022-01-01

## 2022-01-01 RX ORDER — SODIUM CHLORIDE 9 MG/ML
1000 INJECTION, SOLUTION INTRAVENOUS
Refills: 0 | Status: DISCONTINUED | OUTPATIENT
Start: 2022-01-01 | End: 2022-01-01

## 2022-01-01 RX ORDER — DEXTROSE 50 % IN WATER 50 %
12.5 SYRINGE (ML) INTRAVENOUS ONCE
Refills: 0 | Status: DISCONTINUED | OUTPATIENT
Start: 2022-01-01 | End: 2022-01-01

## 2022-01-01 RX ORDER — ACETAMINOPHEN 500 MG
975 TABLET ORAL ONCE
Refills: 0 | Status: COMPLETED | OUTPATIENT
Start: 2022-01-01 | End: 2022-01-01

## 2022-01-01 RX ORDER — MORPHINE SULFATE 50 MG/1
4 CAPSULE, EXTENDED RELEASE ORAL ONCE
Refills: 0 | Status: DISCONTINUED | OUTPATIENT
Start: 2022-01-01 | End: 2022-01-01

## 2022-01-01 RX ORDER — CLOPIDOGREL BISULFATE 75 MG/1
75 TABLET, FILM COATED ORAL
Refills: 0 | Status: DISCONTINUED | COMMUNITY
End: 2022-01-01

## 2022-01-01 RX ORDER — ALBUTEROL 90 MCG
90 AEROSOL (GRAM) INHALATION
Refills: 0 | Status: ACTIVE | COMMUNITY

## 2022-01-01 RX ORDER — METOPROLOL TARTRATE 50 MG
1 TABLET ORAL
Qty: 0 | Refills: 0 | DISCHARGE

## 2022-01-01 RX ORDER — LEVOTHYROXINE SODIUM 125 MCG
125 TABLET ORAL DAILY
Refills: 0 | Status: DISCONTINUED | OUTPATIENT
Start: 2022-01-01 | End: 2022-01-01

## 2022-01-01 RX ORDER — HYDROMORPHONE HYDROCHLORIDE 2 MG/ML
0.5 INJECTION INTRAMUSCULAR; INTRAVENOUS; SUBCUTANEOUS ONCE
Refills: 0 | Status: DISCONTINUED | OUTPATIENT
Start: 2022-01-01 | End: 2022-01-01

## 2022-01-01 RX ORDER — SITAGLIPTIN 100 MG/1
100 TABLET, FILM COATED ORAL DAILY
Refills: 0 | Status: ACTIVE | COMMUNITY

## 2022-01-01 RX ORDER — LIDOCAINE HCL 20 MG/ML
20 VIAL (ML) INJECTION ONCE
Refills: 0 | Status: DISCONTINUED | OUTPATIENT
Start: 2022-01-01 | End: 2022-01-01

## 2022-01-01 RX ORDER — INSULIN LISPRO 100/ML
6 VIAL (ML) SUBCUTANEOUS
Refills: 0 | Status: DISCONTINUED | OUTPATIENT
Start: 2022-01-01 | End: 2022-01-01

## 2022-01-01 RX ORDER — ALLOPURINOL 300 MG
1 TABLET ORAL
Qty: 0 | Refills: 0 | DISCHARGE

## 2022-01-01 RX ORDER — LANOLIN ALCOHOL/MO/W.PET/CERES
3 CREAM (GRAM) TOPICAL AT BEDTIME
Refills: 0 | Status: DISCONTINUED | OUTPATIENT
Start: 2022-01-01 | End: 2022-01-01

## 2022-01-01 RX ORDER — POLYETHYLENE GLYCOL 3350 17 G/17G
17 POWDER, FOR SOLUTION ORAL DAILY
Refills: 0 | Status: DISCONTINUED | OUTPATIENT
Start: 2022-01-01 | End: 2022-01-01

## 2022-01-01 RX ORDER — GABAPENTIN 400 MG/1
300 CAPSULE ORAL EVERY 12 HOURS
Refills: 0 | Status: DISCONTINUED | OUTPATIENT
Start: 2022-01-01 | End: 2022-01-01

## 2022-01-01 RX ORDER — METOPROLOL SUCCINATE 25 MG/1
25 TABLET, EXTENDED RELEASE ORAL DAILY
Refills: 0 | Status: ACTIVE | COMMUNITY

## 2022-01-01 RX ORDER — MAGNESIUM SULFATE 500 MG/ML
2 VIAL (ML) INJECTION ONCE
Refills: 0 | Status: COMPLETED | OUTPATIENT
Start: 2022-01-01 | End: 2022-01-01

## 2022-01-01 RX ORDER — INSULIN ASPART 100 [IU]/ML
100 INJECTION, SOLUTION INTRAVENOUS; SUBCUTANEOUS
Refills: 0 | Status: ACTIVE | COMMUNITY

## 2022-01-01 RX ORDER — BUDESONIDE AND FORMOTEROL FUMARATE DIHYDRATE 160; 4.5 UG/1; UG/1
2 AEROSOL RESPIRATORY (INHALATION)
Refills: 0 | Status: DISCONTINUED | OUTPATIENT
Start: 2022-01-01 | End: 2022-01-01

## 2022-01-01 RX ORDER — OXYCODONE HYDROCHLORIDE 5 MG/1
5 TABLET ORAL EVERY 6 HOURS
Refills: 0 | Status: DISCONTINUED | OUTPATIENT
Start: 2022-01-01 | End: 2022-01-01

## 2022-01-01 RX ORDER — INFLUENZA VIRUS VACCINE 15; 15; 15; 15 UG/.5ML; UG/.5ML; UG/.5ML; UG/.5ML
0.7 SUSPENSION INTRAMUSCULAR ONCE
Refills: 0 | Status: DISCONTINUED | OUTPATIENT
Start: 2022-01-01 | End: 2022-01-01

## 2022-01-01 RX ORDER — DEXTROSE 50 % IN WATER 50 %
25 SYRINGE (ML) INTRAVENOUS ONCE
Refills: 0 | Status: COMPLETED | OUTPATIENT
Start: 2022-01-01 | End: 2022-01-01

## 2022-01-01 RX ORDER — SITAGLIPTIN 100 MG/1
100 TABLET, FILM COATED ORAL
Refills: 0 | Status: DISCONTINUED | COMMUNITY
End: 2022-01-01

## 2022-01-01 RX ORDER — GLUCAGON INJECTION, SOLUTION 0.5 MG/.1ML
1 INJECTION, SOLUTION SUBCUTANEOUS ONCE
Refills: 0 | Status: DISCONTINUED | OUTPATIENT
Start: 2022-01-01 | End: 2022-01-01

## 2022-01-01 RX ORDER — TRAZODONE HCL 50 MG
150 TABLET ORAL AT BEDTIME
Refills: 0 | Status: DISCONTINUED | OUTPATIENT
Start: 2022-01-01 | End: 2022-01-01

## 2022-01-01 RX ORDER — TAMSULOSIN HYDROCHLORIDE 0.4 MG/1
0.4 CAPSULE ORAL
Refills: 0 | Status: ACTIVE | COMMUNITY

## 2022-01-01 RX ORDER — TRAZODONE HCL 50 MG
1 TABLET ORAL
Qty: 0 | Refills: 0 | DISCHARGE

## 2022-01-01 RX ORDER — CALCIUM GLUCONATE 100 MG/ML
1 VIAL (ML) INTRAVENOUS ONCE
Refills: 0 | Status: COMPLETED | OUTPATIENT
Start: 2022-01-01 | End: 2022-01-01

## 2022-01-01 RX ORDER — OMEPRAZOLE 40 MG/1
40 CAPSULE, DELAYED RELEASE ORAL
Qty: 90 | Refills: 2 | Status: ACTIVE | COMMUNITY

## 2022-01-01 RX ORDER — TRAZODONE HYDROCHLORIDE 100 MG/1
100 TABLET ORAL
Refills: 0 | Status: ACTIVE | COMMUNITY

## 2022-01-01 RX ORDER — INSULIN LISPRO 100/ML
VIAL (ML) SUBCUTANEOUS AT BEDTIME
Refills: 0 | Status: DISCONTINUED | OUTPATIENT
Start: 2022-01-01 | End: 2022-01-01

## 2022-01-01 RX ORDER — INSULIN ASPART 100 [IU]/ML
0 INJECTION, SOLUTION SUBCUTANEOUS
Qty: 0 | Refills: 0 | DISCHARGE

## 2022-01-01 RX ORDER — HYDROMORPHONE HYDROCHLORIDE 2 MG/ML
1 INJECTION INTRAMUSCULAR; INTRAVENOUS; SUBCUTANEOUS EVERY 4 HOURS
Refills: 0 | Status: DISCONTINUED | OUTPATIENT
Start: 2022-01-01 | End: 2022-01-01

## 2022-01-01 RX ORDER — TIOTROPIUM BROMIDE 18 UG/1
1 CAPSULE ORAL; RESPIRATORY (INHALATION) DAILY
Refills: 0 | Status: DISCONTINUED | OUTPATIENT
Start: 2022-01-01 | End: 2022-01-01

## 2022-01-01 RX ORDER — IPRATROPIUM BROMIDE 17 UG/1
17 AEROSOL, METERED RESPIRATORY (INHALATION)
Refills: 0 | Status: ACTIVE | COMMUNITY

## 2022-01-01 RX ORDER — ENOXAPARIN SODIUM 100 MG/ML
40 INJECTION SUBCUTANEOUS EVERY 24 HOURS
Refills: 0 | Status: DISCONTINUED | OUTPATIENT
Start: 2022-01-01 | End: 2022-01-01

## 2022-01-01 RX ORDER — DEXAMETHASONE 0.5 MG/5ML
4 ELIXIR ORAL EVERY 6 HOURS
Refills: 0 | Status: DISCONTINUED | OUTPATIENT
Start: 2022-01-01 | End: 2022-01-01

## 2022-01-01 RX ORDER — IPRATROPIUM BROMIDE 42 UG/1
SPRAY, METERED NASAL
Refills: 0 | Status: DISCONTINUED | COMMUNITY
End: 2022-01-01

## 2022-01-01 RX ORDER — ALLOPURINOL 100 MG/1
100 TABLET ORAL
Refills: 0 | Status: DISCONTINUED | COMMUNITY
End: 2022-01-01

## 2022-01-01 RX ORDER — HEPARIN SODIUM 5000 [USP'U]/ML
5000 INJECTION INTRAVENOUS; SUBCUTANEOUS EVERY 12 HOURS
Refills: 0 | Status: DISCONTINUED | OUTPATIENT
Start: 2022-01-01 | End: 2022-01-01

## 2022-01-01 RX ORDER — TAMSULOSIN HYDROCHLORIDE 0.4 MG/1
1 CAPSULE ORAL
Qty: 0 | Refills: 0 | DISCHARGE

## 2022-01-01 RX ORDER — HEPARIN SODIUM 5000 [USP'U]/ML
INJECTION INTRAVENOUS; SUBCUTANEOUS
Qty: 25000 | Refills: 0 | Status: DISCONTINUED | OUTPATIENT
Start: 2022-01-01 | End: 2022-01-01

## 2022-01-01 RX ORDER — NAPROXEN AND ESOMEPRAZOLE MAGNESIUM 375; 20 MG/1; MG/1
1 TABLET, DELAYED RELEASE ORAL
Qty: 0 | Refills: 0 | DISCHARGE

## 2022-01-01 RX ORDER — INSULIN GLARGINE 100 [IU]/ML
100 INJECTION, SOLUTION SUBCUTANEOUS
Refills: 0 | Status: ACTIVE | COMMUNITY

## 2022-01-01 RX ORDER — CLOPIDOGREL BISULFATE 75 MG/1
75 TABLET, FILM COATED ORAL DAILY
Refills: 0 | Status: ACTIVE | COMMUNITY

## 2022-01-01 RX ORDER — TAMSULOSIN HYDROCHLORIDE 0.4 MG/1
0.4 CAPSULE ORAL
Refills: 0 | Status: DISCONTINUED | OUTPATIENT
Start: 2022-01-01 | End: 2022-01-01

## 2022-01-01 RX ORDER — CLOPIDOGREL BISULFATE 75 MG/1
1 TABLET, FILM COATED ORAL
Qty: 30 | Refills: 0
Start: 2022-01-01 | End: 2022-01-01

## 2022-01-01 RX ORDER — TAMSULOSIN HYDROCHLORIDE 0.4 MG/1
0.4 CAPSULE ORAL AT BEDTIME
Refills: 0 | Status: DISCONTINUED | OUTPATIENT
Start: 2022-01-01 | End: 2022-01-01

## 2022-01-01 RX ORDER — DEXTROSE 50 % IN WATER 50 %
15 SYRINGE (ML) INTRAVENOUS ONCE
Refills: 0 | Status: DISCONTINUED | OUTPATIENT
Start: 2022-01-01 | End: 2022-01-01

## 2022-01-01 RX ORDER — INSULIN GLARGINE 100 [IU]/ML
14 INJECTION, SOLUTION SUBCUTANEOUS AT BEDTIME
Refills: 0 | Status: DISCONTINUED | OUTPATIENT
Start: 2022-01-01 | End: 2022-01-01

## 2022-01-01 RX ORDER — HEPARIN SODIUM 5000 [USP'U]/ML
5000 INJECTION INTRAVENOUS; SUBCUTANEOUS ONCE
Refills: 0 | Status: DISCONTINUED | OUTPATIENT
Start: 2022-01-01 | End: 2022-01-01

## 2022-01-01 RX ORDER — LEVOTHYROXINE SODIUM 0.05 MG/1
50 TABLET ORAL
Refills: 0 | Status: DISCONTINUED | COMMUNITY
End: 2022-01-01

## 2022-01-01 RX ORDER — ASPIRIN/CALCIUM CARB/MAGNESIUM 324 MG
324 TABLET ORAL ONCE
Refills: 0 | Status: COMPLETED | OUTPATIENT
Start: 2022-01-01 | End: 2022-01-01

## 2022-01-01 RX ORDER — KETOROLAC TROMETHAMINE 30 MG/ML
15 SYRINGE (ML) INJECTION ONCE
Refills: 0 | Status: DISCONTINUED | OUTPATIENT
Start: 2022-01-01 | End: 2022-01-01

## 2022-01-01 RX ORDER — TAMSULOSIN HYDROCHLORIDE 0.4 MG/1
0.8 CAPSULE ORAL
Refills: 0 | Status: DISCONTINUED | OUTPATIENT
Start: 2022-01-01 | End: 2022-01-01

## 2022-01-01 RX ORDER — LEVOTHYROXINE SODIUM 125 MCG
1 TABLET ORAL
Qty: 0 | Refills: 0 | DISCHARGE

## 2022-01-01 RX ORDER — DEXTROSE 50 % IN WATER 50 %
50 SYRINGE (ML) INTRAVENOUS ONCE
Refills: 0 | Status: DISCONTINUED | OUTPATIENT
Start: 2022-01-01 | End: 2022-01-01

## 2022-01-01 RX ORDER — SITAGLIPTIN 50 MG/1
1 TABLET, FILM COATED ORAL
Qty: 0 | Refills: 0 | DISCHARGE

## 2022-01-01 RX ORDER — HYDROMORPHONE HYDROCHLORIDE 2 MG/ML
1 INJECTION INTRAMUSCULAR; INTRAVENOUS; SUBCUTANEOUS
Refills: 0 | Status: DISCONTINUED | OUTPATIENT
Start: 2022-01-01 | End: 2022-01-01

## 2022-01-01 RX ORDER — INSULIN GLARGINE 100 [IU]/ML
15 INJECTION, SOLUTION SUBCUTANEOUS
Qty: 0 | Refills: 0 | DISCHARGE

## 2022-01-01 RX ORDER — INSULIN GLARGINE 100 [IU]/ML
100 INJECTION, SOLUTION SUBCUTANEOUS
Refills: 0 | Status: DISCONTINUED | COMMUNITY
End: 2022-01-01

## 2022-01-01 RX ORDER — HYDROMORPHONE HYDROCHLORIDE 2 MG/ML
0.2 INJECTION INTRAMUSCULAR; INTRAVENOUS; SUBCUTANEOUS ONCE
Refills: 0 | Status: DISCONTINUED | OUTPATIENT
Start: 2022-01-01 | End: 2022-01-01

## 2022-01-01 RX ORDER — LIDOCAINE 5% 700 MG/1
5 PATCH TOPICAL
Qty: 30 | Refills: 0 | Status: ACTIVE | COMMUNITY
Start: 2022-01-01

## 2022-01-01 RX ORDER — TAMSULOSIN HYDROCHLORIDE 0.4 MG/1
0.4 CAPSULE ORAL
Refills: 0 | Status: DISCONTINUED | COMMUNITY
End: 2022-01-01

## 2022-01-01 RX ORDER — LIDOCAINE 4 G/100G
1 CREAM TOPICAL
Qty: 0 | Refills: 0 | DISCHARGE

## 2022-01-01 RX ORDER — ASPIRIN/CALCIUM CARB/MAGNESIUM 324 MG
81 TABLET ORAL DAILY
Refills: 0 | Status: DISCONTINUED | OUTPATIENT
Start: 2022-01-01 | End: 2022-01-01

## 2022-01-01 RX ORDER — INSULIN ASPART 100 [IU]/ML
100 INJECTION, SOLUTION INTRAVENOUS; SUBCUTANEOUS
Refills: 0 | Status: DISCONTINUED | COMMUNITY
End: 2022-01-01

## 2022-01-01 RX ORDER — IPRATROPIUM BROMIDE AND ALBUTEROL SULFATE 2.5; .5 MG/3ML; MG/3ML
0.5-2.5 (3) SOLUTION RESPIRATORY (INHALATION)
Refills: 0 | Status: ACTIVE | COMMUNITY

## 2022-01-01 RX ORDER — SODIUM CHLORIDE 9 MG/ML
1000 INJECTION INTRAMUSCULAR; INTRAVENOUS; SUBCUTANEOUS ONCE
Refills: 0 | Status: COMPLETED | OUTPATIENT
Start: 2022-01-01 | End: 2022-01-01

## 2022-01-01 RX ORDER — HYDROMORPHONE HYDROCHLORIDE 2 MG/ML
0.2 INJECTION INTRAMUSCULAR; INTRAVENOUS; SUBCUTANEOUS EVERY 4 HOURS
Refills: 0 | Status: DISCONTINUED | OUTPATIENT
Start: 2022-01-01 | End: 2022-01-01

## 2022-01-01 RX ORDER — TRAZODONE HYDROCHLORIDE 100 MG/1
100 TABLET ORAL
Refills: 0 | Status: DISCONTINUED | COMMUNITY
End: 2022-01-01

## 2022-01-01 RX ORDER — INSULIN LISPRO 100/ML
11 VIAL (ML) SUBCUTANEOUS
Refills: 0 | Status: DISCONTINUED | OUTPATIENT
Start: 2022-01-01 | End: 2022-01-01

## 2022-01-01 RX ORDER — IPRATROPIUM BROMIDE 0.2 MG/ML
2 SOLUTION, NON-ORAL INHALATION
Qty: 0 | Refills: 0 | DISCHARGE

## 2022-01-01 RX ORDER — ATORVASTATIN CALCIUM 80 MG/1
1 TABLET, FILM COATED ORAL
Qty: 0 | Refills: 0 | DISCHARGE

## 2022-01-01 RX ORDER — ACETAMINOPHEN 500 MG
1000 TABLET ORAL ONCE
Refills: 0 | Status: COMPLETED | OUTPATIENT
Start: 2022-01-01 | End: 2022-01-01

## 2022-01-01 RX ORDER — IPRATROPIUM/ALBUTEROL SULFATE 18-103MCG
3 AEROSOL WITH ADAPTER (GRAM) INHALATION EVERY 6 HOURS
Refills: 0 | Status: DISCONTINUED | OUTPATIENT
Start: 2022-01-01 | End: 2022-01-01

## 2022-01-01 RX ORDER — ACETAMINOPHEN AND CODEINE PHOSPHATE 300; 30 MG/1; MG/1
300-30 TABLET ORAL
Refills: 0 | Status: DISCONTINUED | COMMUNITY
End: 2022-01-01

## 2022-01-01 RX ORDER — ATORVASTATIN CALCIUM 80 MG/1
80 TABLET, FILM COATED ORAL AT BEDTIME
Refills: 0 | Status: DISCONTINUED | OUTPATIENT
Start: 2022-01-01 | End: 2022-01-01

## 2022-01-01 RX ORDER — ALBUTEROL 90 UG/1
2 AEROSOL, METERED ORAL
Qty: 0 | Refills: 0 | DISCHARGE

## 2022-01-01 RX ORDER — METOPROLOL TARTRATE 50 MG
25 TABLET ORAL DAILY
Refills: 0 | Status: DISCONTINUED | OUTPATIENT
Start: 2022-01-01 | End: 2022-01-01

## 2022-01-01 RX ORDER — HALOPERIDOL DECANOATE 100 MG/ML
2.5 INJECTION INTRAMUSCULAR ONCE
Refills: 0 | Status: COMPLETED | OUTPATIENT
Start: 2022-01-01 | End: 2022-01-01

## 2022-01-01 RX ORDER — CLOPIDOGREL BISULFATE 75 MG/1
75 TABLET, FILM COATED ORAL DAILY
Refills: 0 | Status: DISCONTINUED | OUTPATIENT
Start: 2022-01-01 | End: 2022-01-01

## 2022-01-01 RX ORDER — INSULIN LISPRO 100/ML
8 VIAL (ML) SUBCUTANEOUS
Refills: 0 | Status: DISCONTINUED | OUTPATIENT
Start: 2022-01-01 | End: 2022-01-01

## 2022-01-01 RX ORDER — SENNA PLUS 8.6 MG/1
2 TABLET ORAL AT BEDTIME
Refills: 0 | Status: DISCONTINUED | OUTPATIENT
Start: 2022-01-01 | End: 2022-01-01

## 2022-01-01 RX ORDER — INSULIN GLARGINE 100 [IU]/ML
10 INJECTION, SOLUTION SUBCUTANEOUS AT BEDTIME
Refills: 0 | Status: DISCONTINUED | OUTPATIENT
Start: 2022-01-01 | End: 2022-01-01

## 2022-01-01 RX ORDER — HEPARIN SODIUM 5000 [USP'U]/ML
4400 INJECTION INTRAVENOUS; SUBCUTANEOUS EVERY 6 HOURS
Refills: 0 | Status: DISCONTINUED | OUTPATIENT
Start: 2022-01-01 | End: 2022-01-01

## 2022-01-01 RX ORDER — HYDROMORPHONE HYDROCHLORIDE 2 MG/ML
4 INJECTION INTRAMUSCULAR; INTRAVENOUS; SUBCUTANEOUS
Refills: 0 | Status: DISCONTINUED | OUTPATIENT
Start: 2022-01-01 | End: 2022-01-01

## 2022-01-01 RX ORDER — INSULIN HUMAN 100 [IU]/ML
10 INJECTION, SOLUTION SUBCUTANEOUS ONCE
Refills: 0 | Status: COMPLETED | OUTPATIENT
Start: 2022-01-01 | End: 2022-01-01

## 2022-01-01 RX ORDER — INSULIN LISPRO 100/ML
10 VIAL (ML) SUBCUTANEOUS
Refills: 0 | Status: DISCONTINUED | OUTPATIENT
Start: 2022-01-01 | End: 2022-01-01

## 2022-01-01 RX ORDER — SODIUM ZIRCONIUM CYCLOSILICATE 10 G/10G
10 POWDER, FOR SUSPENSION ORAL ONCE
Refills: 0 | Status: COMPLETED | OUTPATIENT
Start: 2022-01-01 | End: 2022-01-01

## 2022-01-01 RX ORDER — NAPROXEN AND ESOMEPRAZOLE MAGNESIUM 500; 20 MG/1; MG/1
500-20 TABLET, DELAYED RELEASE ORAL
Qty: 60 | Refills: 0 | Status: ACTIVE | COMMUNITY
Start: 2022-01-01

## 2022-01-01 RX ORDER — CLOPIDOGREL BISULFATE 75 MG/1
1 TABLET, FILM COATED ORAL
Qty: 0 | Refills: 0 | DISCHARGE

## 2022-01-01 RX ORDER — ATORVASTATIN CALCIUM 80 MG/1
80 TABLET, FILM COATED ORAL
Refills: 0 | Status: DISCONTINUED | COMMUNITY
End: 2022-01-01

## 2022-01-01 RX ORDER — IPRATROPIUM/ALBUTEROL SULFATE 18-103MCG
3 AEROSOL WITH ADAPTER (GRAM) INHALATION EVERY 12 HOURS
Refills: 0 | Status: DISCONTINUED | OUTPATIENT
Start: 2022-01-01 | End: 2022-01-01

## 2022-01-01 RX ORDER — INSULIN GLARGINE 100 [IU]/ML
23 INJECTION, SOLUTION SUBCUTANEOUS AT BEDTIME
Refills: 0 | Status: DISCONTINUED | OUTPATIENT
Start: 2022-01-01 | End: 2022-01-01

## 2022-01-01 RX ORDER — ATORVASTATIN CALCIUM 80 MG/1
1 TABLET, FILM COATED ORAL
Qty: 30 | Refills: 0
Start: 2022-01-01 | End: 2022-01-01

## 2022-01-01 RX ORDER — INSULIN GLARGINE 100 [IU]/ML
16 INJECTION, SOLUTION SUBCUTANEOUS AT BEDTIME
Refills: 0 | Status: DISCONTINUED | OUTPATIENT
Start: 2022-01-01 | End: 2022-01-01

## 2022-01-01 RX ADMIN — TAMSULOSIN HYDROCHLORIDE 0.4 MILLIGRAM(S): 0.4 CAPSULE ORAL at 06:01

## 2022-01-01 RX ADMIN — OXYCODONE HYDROCHLORIDE 5 MILLIGRAM(S): 5 TABLET ORAL at 05:56

## 2022-01-01 RX ADMIN — Medication 3 MILLILITER(S): at 22:02

## 2022-01-01 RX ADMIN — TAMSULOSIN HYDROCHLORIDE 0.4 MILLIGRAM(S): 0.4 CAPSULE ORAL at 05:56

## 2022-01-01 RX ADMIN — Medication 100 MILLIGRAM(S): at 12:23

## 2022-01-01 RX ADMIN — HYDROMORPHONE HYDROCHLORIDE 2 MILLIGRAM(S): 2 INJECTION INTRAMUSCULAR; INTRAVENOUS; SUBCUTANEOUS at 13:49

## 2022-01-01 RX ADMIN — Medication 400 MILLIGRAM(S): at 15:41

## 2022-01-01 RX ADMIN — ALBUTEROL 2 PUFF(S): 90 AEROSOL, METERED ORAL at 05:46

## 2022-01-01 RX ADMIN — LIDOCAINE 1 PATCH: 4 CREAM TOPICAL at 00:44

## 2022-01-01 RX ADMIN — TAMSULOSIN HYDROCHLORIDE 0.4 MILLIGRAM(S): 0.4 CAPSULE ORAL at 17:42

## 2022-01-01 RX ADMIN — Medication 2: at 17:47

## 2022-01-01 RX ADMIN — SODIUM CHLORIDE 1000 MILLILITER(S): 9 INJECTION INTRAMUSCULAR; INTRAVENOUS; SUBCUTANEOUS at 21:30

## 2022-01-01 RX ADMIN — ENOXAPARIN SODIUM 40 MILLIGRAM(S): 100 INJECTION SUBCUTANEOUS at 18:50

## 2022-01-01 RX ADMIN — Medication 8 UNIT(S): at 12:47

## 2022-01-01 RX ADMIN — Medication 4 MILLIGRAM(S): at 05:24

## 2022-01-01 RX ADMIN — INSULIN GLARGINE 16 UNIT(S): 100 INJECTION, SOLUTION SUBCUTANEOUS at 21:51

## 2022-01-01 RX ADMIN — Medication 4: at 18:13

## 2022-01-01 RX ADMIN — HYDROMORPHONE HYDROCHLORIDE 4 MILLIGRAM(S): 2 INJECTION INTRAMUSCULAR; INTRAVENOUS; SUBCUTANEOUS at 13:11

## 2022-01-01 RX ADMIN — Medication 100 MILLIGRAM(S): at 12:24

## 2022-01-01 RX ADMIN — Medication 125 MICROGRAM(S): at 05:24

## 2022-01-01 RX ADMIN — HYDROMORPHONE HYDROCHLORIDE 4 MILLIGRAM(S): 2 INJECTION INTRAMUSCULAR; INTRAVENOUS; SUBCUTANEOUS at 17:04

## 2022-01-01 RX ADMIN — Medication 324 MILLIGRAM(S): at 03:04

## 2022-01-01 RX ADMIN — Medication 100 MILLIGRAM(S): at 21:11

## 2022-01-01 RX ADMIN — Medication 1 PATCH: at 12:10

## 2022-01-01 RX ADMIN — Medication 1 SPRAY(S): at 06:00

## 2022-01-01 RX ADMIN — Medication 4: at 09:17

## 2022-01-01 RX ADMIN — Medication 1 PATCH: at 11:40

## 2022-01-01 RX ADMIN — HYDROMORPHONE HYDROCHLORIDE 4 MILLIGRAM(S): 2 INJECTION INTRAMUSCULAR; INTRAVENOUS; SUBCUTANEOUS at 05:24

## 2022-01-01 RX ADMIN — Medication 1 PATCH: at 06:14

## 2022-01-01 RX ADMIN — ATORVASTATIN CALCIUM 80 MILLIGRAM(S): 80 TABLET, FILM COATED ORAL at 21:18

## 2022-01-01 RX ADMIN — Medication 3 MILLILITER(S): at 12:13

## 2022-01-01 RX ADMIN — SENNA PLUS 2 TABLET(S): 8.6 TABLET ORAL at 21:18

## 2022-01-01 RX ADMIN — INSULIN GLARGINE 8 UNIT(S): 100 INJECTION, SOLUTION SUBCUTANEOUS at 22:39

## 2022-01-01 RX ADMIN — Medication 6: at 12:23

## 2022-01-01 RX ADMIN — Medication 1: at 18:33

## 2022-01-01 RX ADMIN — OXYCODONE HYDROCHLORIDE 5 MILLIGRAM(S): 5 TABLET ORAL at 18:34

## 2022-01-01 RX ADMIN — POLYETHYLENE GLYCOL 3350 17 GRAM(S): 17 POWDER, FOR SOLUTION ORAL at 12:12

## 2022-01-01 RX ADMIN — Medication 0.5 MILLIGRAM(S): at 09:32

## 2022-01-01 RX ADMIN — HYDROMORPHONE HYDROCHLORIDE 2 MILLIGRAM(S): 2 INJECTION INTRAMUSCULAR; INTRAVENOUS; SUBCUTANEOUS at 19:08

## 2022-01-01 RX ADMIN — HYDROMORPHONE HYDROCHLORIDE 4 MILLIGRAM(S): 2 INJECTION INTRAMUSCULAR; INTRAVENOUS; SUBCUTANEOUS at 11:33

## 2022-01-01 RX ADMIN — HYDROMORPHONE HYDROCHLORIDE 1 MILLIGRAM(S): 2 INJECTION INTRAMUSCULAR; INTRAVENOUS; SUBCUTANEOUS at 04:12

## 2022-01-01 RX ADMIN — Medication 11 UNIT(S): at 12:50

## 2022-01-01 RX ADMIN — HEPARIN SODIUM 5000 UNIT(S): 5000 INJECTION INTRAVENOUS; SUBCUTANEOUS at 05:46

## 2022-01-01 RX ADMIN — Medication 600 MILLIGRAM(S): at 22:55

## 2022-01-01 RX ADMIN — BUDESONIDE AND FORMOTEROL FUMARATE DIHYDRATE 2 PUFF(S): 160; 4.5 AEROSOL RESPIRATORY (INHALATION) at 09:21

## 2022-01-01 RX ADMIN — SENNA PLUS 2 TABLET(S): 8.6 TABLET ORAL at 22:20

## 2022-01-01 RX ADMIN — HYDROMORPHONE HYDROCHLORIDE 4 MILLIGRAM(S): 2 INJECTION INTRAMUSCULAR; INTRAVENOUS; SUBCUTANEOUS at 12:30

## 2022-01-01 RX ADMIN — Medication 1 PATCH: at 12:13

## 2022-01-01 RX ADMIN — Medication 50 GRAM(S): at 08:37

## 2022-01-01 RX ADMIN — Medication 1 PATCH: at 11:30

## 2022-01-01 RX ADMIN — SENNA PLUS 2 TABLET(S): 8.6 TABLET ORAL at 21:51

## 2022-01-01 RX ADMIN — POLYETHYLENE GLYCOL 3350 17 GRAM(S): 17 POWDER, FOR SOLUTION ORAL at 11:35

## 2022-01-01 RX ADMIN — TAMSULOSIN HYDROCHLORIDE 0.4 MILLIGRAM(S): 0.4 CAPSULE ORAL at 17:48

## 2022-01-01 RX ADMIN — Medication 3 MILLILITER(S): at 22:20

## 2022-01-01 RX ADMIN — HYDROMORPHONE HYDROCHLORIDE 1 MILLIGRAM(S): 2 INJECTION INTRAMUSCULAR; INTRAVENOUS; SUBCUTANEOUS at 04:35

## 2022-01-01 RX ADMIN — Medication 3 MILLILITER(S): at 09:18

## 2022-01-01 RX ADMIN — Medication 25 MILLILITER(S): at 08:54

## 2022-01-01 RX ADMIN — HEPARIN SODIUM 4400 UNIT(S): 5000 INJECTION INTRAVENOUS; SUBCUTANEOUS at 04:45

## 2022-01-01 RX ADMIN — Medication 150 MILLIGRAM(S): at 21:41

## 2022-01-01 RX ADMIN — GABAPENTIN 300 MILLIGRAM(S): 400 CAPSULE ORAL at 21:51

## 2022-01-01 RX ADMIN — BUDESONIDE AND FORMOTEROL FUMARATE DIHYDRATE 2 PUFF(S): 160; 4.5 AEROSOL RESPIRATORY (INHALATION) at 21:19

## 2022-01-01 RX ADMIN — BUPROPION HYDROCHLORIDE 150 MILLIGRAM(S): 150 TABLET, EXTENDED RELEASE ORAL at 12:12

## 2022-01-01 RX ADMIN — ATORVASTATIN CALCIUM 80 MILLIGRAM(S): 80 TABLET, FILM COATED ORAL at 21:50

## 2022-01-01 RX ADMIN — Medication 4 MILLIGRAM(S): at 23:47

## 2022-01-01 RX ADMIN — HYDROMORPHONE HYDROCHLORIDE 1 MILLIGRAM(S): 2 INJECTION INTRAMUSCULAR; INTRAVENOUS; SUBCUTANEOUS at 07:32

## 2022-01-01 RX ADMIN — MORPHINE SULFATE 4 MILLIGRAM(S): 50 CAPSULE, EXTENDED RELEASE ORAL at 04:40

## 2022-01-01 RX ADMIN — LIDOCAINE 1 PATCH: 4 CREAM TOPICAL at 11:36

## 2022-01-01 RX ADMIN — Medication 150 MILLIGRAM(S): at 21:19

## 2022-01-01 RX ADMIN — LIDOCAINE 1 PATCH: 4 CREAM TOPICAL at 12:19

## 2022-01-01 RX ADMIN — Medication 4 MILLIGRAM(S): at 18:06

## 2022-01-01 RX ADMIN — Medication 1 SPRAY(S): at 05:59

## 2022-01-01 RX ADMIN — HYDROMORPHONE HYDROCHLORIDE 4 MILLIGRAM(S): 2 INJECTION INTRAMUSCULAR; INTRAVENOUS; SUBCUTANEOUS at 23:20

## 2022-01-01 RX ADMIN — HYDROMORPHONE HYDROCHLORIDE 4 MILLIGRAM(S): 2 INJECTION INTRAMUSCULAR; INTRAVENOUS; SUBCUTANEOUS at 00:00

## 2022-01-01 RX ADMIN — LIDOCAINE 1 PATCH: 4 CREAM TOPICAL at 23:48

## 2022-01-01 RX ADMIN — BUPROPION HYDROCHLORIDE 150 MILLIGRAM(S): 150 TABLET, EXTENDED RELEASE ORAL at 12:24

## 2022-01-01 RX ADMIN — LIDOCAINE 1 PATCH: 4 CREAM TOPICAL at 23:55

## 2022-01-01 RX ADMIN — HYDROMORPHONE HYDROCHLORIDE 4 MILLIGRAM(S): 2 INJECTION INTRAMUSCULAR; INTRAVENOUS; SUBCUTANEOUS at 17:36

## 2022-01-01 RX ADMIN — HYDROMORPHONE HYDROCHLORIDE 4 MILLIGRAM(S): 2 INJECTION INTRAMUSCULAR; INTRAVENOUS; SUBCUTANEOUS at 18:08

## 2022-01-01 RX ADMIN — Medication 1 SPRAY(S): at 15:41

## 2022-01-01 RX ADMIN — TAMSULOSIN HYDROCHLORIDE 0.4 MILLIGRAM(S): 0.4 CAPSULE ORAL at 05:53

## 2022-01-01 RX ADMIN — Medication 100 MILLIGRAM(S): at 12:11

## 2022-01-01 RX ADMIN — Medication 650 MILLIGRAM(S): at 09:50

## 2022-01-01 RX ADMIN — Medication 1 PATCH: at 19:30

## 2022-01-01 RX ADMIN — INSULIN GLARGINE 23 UNIT(S): 100 INJECTION, SOLUTION SUBCUTANEOUS at 22:43

## 2022-01-01 RX ADMIN — BUDESONIDE AND FORMOTEROL FUMARATE DIHYDRATE 2 PUFF(S): 160; 4.5 AEROSOL RESPIRATORY (INHALATION) at 21:41

## 2022-01-01 RX ADMIN — Medication 25 GRAM(S): at 09:38

## 2022-01-01 RX ADMIN — OXYCODONE HYDROCHLORIDE 2.5 MILLIGRAM(S): 5 TABLET ORAL at 13:12

## 2022-01-01 RX ADMIN — TAMSULOSIN HYDROCHLORIDE 0.4 MILLIGRAM(S): 0.4 CAPSULE ORAL at 18:34

## 2022-01-01 RX ADMIN — HALOPERIDOL DECANOATE 2.5 MILLIGRAM(S): 100 INJECTION INTRAMUSCULAR at 08:16

## 2022-01-01 RX ADMIN — Medication 1 PATCH: at 12:24

## 2022-01-01 RX ADMIN — HYDROMORPHONE HYDROCHLORIDE 4 MILLIGRAM(S): 2 INJECTION INTRAMUSCULAR; INTRAVENOUS; SUBCUTANEOUS at 22:45

## 2022-01-01 RX ADMIN — HYDROMORPHONE HYDROCHLORIDE 4 MILLIGRAM(S): 2 INJECTION INTRAMUSCULAR; INTRAVENOUS; SUBCUTANEOUS at 18:36

## 2022-01-01 RX ADMIN — MORPHINE SULFATE 4 MILLIGRAM(S): 50 CAPSULE, EXTENDED RELEASE ORAL at 15:29

## 2022-01-01 RX ADMIN — LIDOCAINE 1 PATCH: 4 CREAM TOPICAL at 09:49

## 2022-01-01 RX ADMIN — LIDOCAINE 1 PATCH: 4 CREAM TOPICAL at 19:30

## 2022-01-01 RX ADMIN — Medication 6 UNIT(S): at 17:46

## 2022-01-01 RX ADMIN — Medication 150 MILLIGRAM(S): at 21:51

## 2022-01-01 RX ADMIN — Medication 1 PATCH: at 11:41

## 2022-01-01 RX ADMIN — LIDOCAINE 1 PATCH: 4 CREAM TOPICAL at 12:24

## 2022-01-01 RX ADMIN — TAMSULOSIN HYDROCHLORIDE 0.4 MILLIGRAM(S): 0.4 CAPSULE ORAL at 05:24

## 2022-01-01 RX ADMIN — MORPHINE SULFATE 4 MILLIGRAM(S): 50 CAPSULE, EXTENDED RELEASE ORAL at 22:55

## 2022-01-01 RX ADMIN — OXYCODONE HYDROCHLORIDE 5 MILLIGRAM(S): 5 TABLET ORAL at 19:50

## 2022-01-01 RX ADMIN — HYDROMORPHONE HYDROCHLORIDE 4 MILLIGRAM(S): 2 INJECTION INTRAMUSCULAR; INTRAVENOUS; SUBCUTANEOUS at 18:04

## 2022-01-01 RX ADMIN — HYDROMORPHONE HYDROCHLORIDE 4 MILLIGRAM(S): 2 INJECTION INTRAMUSCULAR; INTRAVENOUS; SUBCUTANEOUS at 13:20

## 2022-01-01 RX ADMIN — ENOXAPARIN SODIUM 40 MILLIGRAM(S): 100 INJECTION SUBCUTANEOUS at 18:08

## 2022-01-01 RX ADMIN — Medication 125 MICROGRAM(S): at 06:01

## 2022-01-01 RX ADMIN — Medication 5 MILLIGRAM(S): at 22:20

## 2022-01-01 RX ADMIN — Medication 5 MILLIGRAM(S): at 22:39

## 2022-01-01 RX ADMIN — ATORVASTATIN CALCIUM 80 MILLIGRAM(S): 80 TABLET, FILM COATED ORAL at 22:19

## 2022-01-01 RX ADMIN — BUDESONIDE AND FORMOTEROL FUMARATE DIHYDRATE 2 PUFF(S): 160; 4.5 AEROSOL RESPIRATORY (INHALATION) at 09:38

## 2022-01-01 RX ADMIN — MORPHINE SULFATE 4 MILLIGRAM(S): 50 CAPSULE, EXTENDED RELEASE ORAL at 05:10

## 2022-01-01 RX ADMIN — Medication 4 MILLIGRAM(S): at 23:04

## 2022-01-01 RX ADMIN — Medication 4 MILLIGRAM(S): at 17:05

## 2022-01-01 RX ADMIN — Medication 6 UNIT(S): at 18:14

## 2022-01-01 RX ADMIN — Medication 3 MILLILITER(S): at 20:46

## 2022-01-01 RX ADMIN — TAMSULOSIN HYDROCHLORIDE 0.4 MILLIGRAM(S): 0.4 CAPSULE ORAL at 05:47

## 2022-01-01 RX ADMIN — Medication 81 MILLIGRAM(S): at 12:11

## 2022-01-01 RX ADMIN — GABAPENTIN 300 MILLIGRAM(S): 400 CAPSULE ORAL at 05:53

## 2022-01-01 RX ADMIN — LIDOCAINE 1 PATCH: 4 CREAM TOPICAL at 21:37

## 2022-01-01 RX ADMIN — Medication 81 MILLIGRAM(S): at 12:22

## 2022-01-01 RX ADMIN — BUDESONIDE AND FORMOTEROL FUMARATE DIHYDRATE 2 PUFF(S): 160; 4.5 AEROSOL RESPIRATORY (INHALATION) at 22:20

## 2022-01-01 RX ADMIN — Medication 1 PATCH: at 07:44

## 2022-01-01 RX ADMIN — ENOXAPARIN SODIUM 40 MILLIGRAM(S): 100 INJECTION SUBCUTANEOUS at 18:15

## 2022-01-01 RX ADMIN — Medication 4: at 12:49

## 2022-01-01 RX ADMIN — Medication 3 MILLILITER(S): at 21:24

## 2022-01-01 RX ADMIN — LIDOCAINE 1 PATCH: 4 CREAM TOPICAL at 19:45

## 2022-01-01 RX ADMIN — Medication 100 MILLIGRAM(S): at 21:58

## 2022-01-01 RX ADMIN — BUPROPION HYDROCHLORIDE 150 MILLIGRAM(S): 150 TABLET, EXTENDED RELEASE ORAL at 12:22

## 2022-01-01 RX ADMIN — POLYETHYLENE GLYCOL 3350 17 GRAM(S): 17 POWDER, FOR SOLUTION ORAL at 12:23

## 2022-01-01 RX ADMIN — HYDROMORPHONE HYDROCHLORIDE 1 MILLIGRAM(S): 2 INJECTION INTRAMUSCULAR; INTRAVENOUS; SUBCUTANEOUS at 08:30

## 2022-01-01 RX ADMIN — BUDESONIDE AND FORMOTEROL FUMARATE DIHYDRATE 2 PUFF(S): 160; 4.5 AEROSOL RESPIRATORY (INHALATION) at 21:51

## 2022-01-01 RX ADMIN — HYDROMORPHONE HYDROCHLORIDE 4 MILLIGRAM(S): 2 INJECTION INTRAMUSCULAR; INTRAVENOUS; SUBCUTANEOUS at 10:15

## 2022-01-01 RX ADMIN — INSULIN GLARGINE 10 UNIT(S): 100 INJECTION, SOLUTION SUBCUTANEOUS at 21:46

## 2022-01-01 RX ADMIN — POLYETHYLENE GLYCOL 3350 17 GRAM(S): 17 POWDER, FOR SOLUTION ORAL at 12:13

## 2022-01-01 RX ADMIN — Medication 1 PATCH: at 19:28

## 2022-01-01 RX ADMIN — GABAPENTIN 300 MILLIGRAM(S): 400 CAPSULE ORAL at 21:19

## 2022-01-01 RX ADMIN — TAMSULOSIN HYDROCHLORIDE 0.4 MILLIGRAM(S): 0.4 CAPSULE ORAL at 17:04

## 2022-01-01 RX ADMIN — Medication 25 GRAM(S): at 08:43

## 2022-01-01 RX ADMIN — Medication 1 SPRAY(S): at 17:49

## 2022-01-01 RX ADMIN — LIDOCAINE 1 PATCH: 4 CREAM TOPICAL at 12:14

## 2022-01-01 RX ADMIN — HEPARIN SODIUM 900 UNIT(S)/HR: 5000 INJECTION INTRAVENOUS; SUBCUTANEOUS at 08:43

## 2022-01-01 RX ADMIN — Medication 125 MICROGRAM(S): at 05:47

## 2022-01-01 RX ADMIN — HYDROMORPHONE HYDROCHLORIDE 2 MILLIGRAM(S): 2 INJECTION INTRAMUSCULAR; INTRAVENOUS; SUBCUTANEOUS at 08:33

## 2022-01-01 RX ADMIN — HYDROMORPHONE HYDROCHLORIDE 4 MILLIGRAM(S): 2 INJECTION INTRAMUSCULAR; INTRAVENOUS; SUBCUTANEOUS at 22:20

## 2022-01-01 RX ADMIN — Medication 81 MILLIGRAM(S): at 12:24

## 2022-01-01 RX ADMIN — Medication 4 MILLIGRAM(S): at 12:12

## 2022-01-01 RX ADMIN — HYDROMORPHONE HYDROCHLORIDE 1 MILLIGRAM(S): 2 INJECTION INTRAMUSCULAR; INTRAVENOUS; SUBCUTANEOUS at 09:32

## 2022-01-01 RX ADMIN — Medication 4 MILLIGRAM(S): at 17:49

## 2022-01-01 RX ADMIN — HYDROMORPHONE HYDROCHLORIDE 4 MILLIGRAM(S): 2 INJECTION INTRAMUSCULAR; INTRAVENOUS; SUBCUTANEOUS at 19:05

## 2022-01-01 RX ADMIN — LIDOCAINE 1 PATCH: 4 CREAM TOPICAL at 19:05

## 2022-01-01 RX ADMIN — Medication 1: at 08:55

## 2022-01-01 RX ADMIN — Medication 100 MILLIGRAM(S): at 08:50

## 2022-01-01 RX ADMIN — HYDROMORPHONE HYDROCHLORIDE 2 MILLIGRAM(S): 2 INJECTION INTRAMUSCULAR; INTRAVENOUS; SUBCUTANEOUS at 12:49

## 2022-01-01 RX ADMIN — Medication 25 GRAM(S): at 09:45

## 2022-01-01 RX ADMIN — TAMSULOSIN HYDROCHLORIDE 0.4 MILLIGRAM(S): 0.4 CAPSULE ORAL at 18:32

## 2022-01-01 RX ADMIN — HYDROMORPHONE HYDROCHLORIDE 4 MILLIGRAM(S): 2 INJECTION INTRAMUSCULAR; INTRAVENOUS; SUBCUTANEOUS at 09:19

## 2022-01-01 RX ADMIN — HYDROMORPHONE HYDROCHLORIDE 2 MILLIGRAM(S): 2 INJECTION INTRAMUSCULAR; INTRAVENOUS; SUBCUTANEOUS at 18:08

## 2022-01-01 RX ADMIN — Medication 1 PATCH: at 06:23

## 2022-01-01 RX ADMIN — Medication 125 MICROGRAM(S): at 05:57

## 2022-01-01 RX ADMIN — Medication 1 SPRAY(S): at 17:05

## 2022-01-01 RX ADMIN — Medication 1 PATCH: at 19:05

## 2022-01-01 RX ADMIN — ATORVASTATIN CALCIUM 80 MILLIGRAM(S): 80 TABLET, FILM COATED ORAL at 21:40

## 2022-01-01 RX ADMIN — SODIUM ZIRCONIUM CYCLOSILICATE 10 GRAM(S): 10 POWDER, FOR SUSPENSION ORAL at 09:01

## 2022-01-01 RX ADMIN — Medication 3 MILLILITER(S): at 08:14

## 2022-01-01 RX ADMIN — HYDROMORPHONE HYDROCHLORIDE 4 MILLIGRAM(S): 2 INJECTION INTRAMUSCULAR; INTRAVENOUS; SUBCUTANEOUS at 05:53

## 2022-01-01 RX ADMIN — Medication 3 MILLILITER(S): at 22:16

## 2022-01-01 RX ADMIN — Medication 30 MILLILITER(S): at 04:24

## 2022-01-01 RX ADMIN — Medication 10 UNIT(S): at 17:48

## 2022-01-01 RX ADMIN — ALBUTEROL 2 PUFF(S): 90 AEROSOL, METERED ORAL at 09:13

## 2022-01-01 RX ADMIN — Medication 2: at 08:43

## 2022-01-01 RX ADMIN — HYDROMORPHONE HYDROCHLORIDE 4 MILLIGRAM(S): 2 INJECTION INTRAMUSCULAR; INTRAVENOUS; SUBCUTANEOUS at 12:11

## 2022-01-01 RX ADMIN — ATORVASTATIN CALCIUM 80 MILLIGRAM(S): 80 TABLET, FILM COATED ORAL at 21:37

## 2022-01-01 RX ADMIN — Medication 100 MILLIGRAM(S): at 12:12

## 2022-01-01 RX ADMIN — Medication 5 MILLIGRAM(S): at 21:51

## 2022-01-01 RX ADMIN — MORPHINE SULFATE 4 MILLIGRAM(S): 50 CAPSULE, EXTENDED RELEASE ORAL at 18:46

## 2022-01-01 RX ADMIN — HYDROMORPHONE HYDROCHLORIDE 4 MILLIGRAM(S): 2 INJECTION INTRAMUSCULAR; INTRAVENOUS; SUBCUTANEOUS at 12:21

## 2022-01-01 RX ADMIN — Medication 3 MILLILITER(S): at 15:12

## 2022-01-01 RX ADMIN — Medication 4 MILLIGRAM(S): at 01:55

## 2022-01-01 RX ADMIN — Medication 10: at 17:45

## 2022-01-01 RX ADMIN — CLOPIDOGREL BISULFATE 75 MILLIGRAM(S): 75 TABLET, FILM COATED ORAL at 12:11

## 2022-01-01 RX ADMIN — HYDROMORPHONE HYDROCHLORIDE 4 MILLIGRAM(S): 2 INJECTION INTRAMUSCULAR; INTRAVENOUS; SUBCUTANEOUS at 06:24

## 2022-01-01 RX ADMIN — GABAPENTIN 300 MILLIGRAM(S): 400 CAPSULE ORAL at 22:38

## 2022-01-01 RX ADMIN — Medication 4 MILLIGRAM(S): at 05:58

## 2022-01-01 RX ADMIN — Medication 4 MILLIGRAM(S): at 23:06

## 2022-01-01 RX ADMIN — GABAPENTIN 300 MILLIGRAM(S): 400 CAPSULE ORAL at 18:32

## 2022-01-01 RX ADMIN — Medication 6 UNIT(S): at 09:17

## 2022-01-01 RX ADMIN — Medication 125 MICROGRAM(S): at 05:53

## 2022-01-01 RX ADMIN — HYDROMORPHONE HYDROCHLORIDE 0.2 MILLIGRAM(S): 2 INJECTION INTRAMUSCULAR; INTRAVENOUS; SUBCUTANEOUS at 14:00

## 2022-01-01 RX ADMIN — Medication 4: at 12:27

## 2022-01-01 RX ADMIN — HYDROMORPHONE HYDROCHLORIDE 4 MILLIGRAM(S): 2 INJECTION INTRAMUSCULAR; INTRAVENOUS; SUBCUTANEOUS at 21:58

## 2022-01-01 RX ADMIN — Medication 2: at 09:37

## 2022-01-01 RX ADMIN — HEPARIN SODIUM 900 UNIT(S)/HR: 5000 INJECTION INTRAVENOUS; SUBCUTANEOUS at 04:45

## 2022-01-01 RX ADMIN — Medication 100 MILLIGRAM(S): at 21:16

## 2022-01-01 RX ADMIN — Medication 11 UNIT(S): at 18:33

## 2022-01-01 RX ADMIN — Medication 150 MILLIGRAM(S): at 22:38

## 2022-01-01 RX ADMIN — Medication 1 PATCH: at 12:20

## 2022-01-01 RX ADMIN — HYDROMORPHONE HYDROCHLORIDE 4 MILLIGRAM(S): 2 INJECTION INTRAMUSCULAR; INTRAVENOUS; SUBCUTANEOUS at 06:53

## 2022-01-01 RX ADMIN — Medication 4 MILLIGRAM(S): at 17:41

## 2022-01-01 RX ADMIN — Medication 4 MILLIGRAM(S): at 18:32

## 2022-01-01 RX ADMIN — Medication 4 MILLIGRAM(S): at 05:03

## 2022-01-01 RX ADMIN — Medication 3 MILLIGRAM(S): at 00:18

## 2022-01-01 RX ADMIN — BUDESONIDE AND FORMOTEROL FUMARATE DIHYDRATE 2 PUFF(S): 160; 4.5 AEROSOL RESPIRATORY (INHALATION) at 21:37

## 2022-01-01 RX ADMIN — Medication 81 MILLIGRAM(S): at 11:36

## 2022-01-01 RX ADMIN — Medication 1 PATCH: at 06:33

## 2022-01-01 RX ADMIN — Medication 150 MILLIGRAM(S): at 21:37

## 2022-01-01 RX ADMIN — HYDROMORPHONE HYDROCHLORIDE 4 MILLIGRAM(S): 2 INJECTION INTRAMUSCULAR; INTRAVENOUS; SUBCUTANEOUS at 21:45

## 2022-01-01 RX ADMIN — Medication 8 UNIT(S): at 09:36

## 2022-01-01 RX ADMIN — Medication 1: at 12:24

## 2022-01-01 RX ADMIN — Medication 125 MICROGRAM(S): at 05:03

## 2022-01-01 RX ADMIN — Medication 1 PATCH: at 07:27

## 2022-01-01 RX ADMIN — Medication 150 MILLIGRAM(S): at 22:20

## 2022-01-01 RX ADMIN — Medication 1 PATCH: at 12:15

## 2022-01-01 RX ADMIN — TAMSULOSIN HYDROCHLORIDE 0.4 MILLIGRAM(S): 0.4 CAPSULE ORAL at 18:06

## 2022-01-01 RX ADMIN — BUPROPION HYDROCHLORIDE 150 MILLIGRAM(S): 150 TABLET, EXTENDED RELEASE ORAL at 12:11

## 2022-01-01 RX ADMIN — TAMSULOSIN HYDROCHLORIDE 0.4 MILLIGRAM(S): 0.4 CAPSULE ORAL at 05:02

## 2022-01-01 RX ADMIN — Medication 600 MILLIGRAM(S): at 22:35

## 2022-01-01 RX ADMIN — Medication 4 MILLIGRAM(S): at 05:53

## 2022-01-01 RX ADMIN — Medication 15 MILLIGRAM(S): at 11:34

## 2022-01-01 RX ADMIN — Medication 8: at 12:47

## 2022-01-01 RX ADMIN — INSULIN HUMAN 10 UNIT(S): 100 INJECTION, SOLUTION SUBCUTANEOUS at 08:38

## 2022-01-01 RX ADMIN — Medication 1000 MILLIGRAM(S): at 16:10

## 2022-01-01 RX ADMIN — ATORVASTATIN CALCIUM 80 MILLIGRAM(S): 80 TABLET, FILM COATED ORAL at 22:39

## 2022-01-01 RX ADMIN — ENOXAPARIN SODIUM 40 MILLIGRAM(S): 100 INJECTION SUBCUTANEOUS at 18:33

## 2022-01-01 RX ADMIN — Medication 5 MILLIGRAM(S): at 21:18

## 2022-01-01 RX ADMIN — BUPROPION HYDROCHLORIDE 150 MILLIGRAM(S): 150 TABLET, EXTENDED RELEASE ORAL at 11:36

## 2022-01-01 RX ADMIN — Medication 15 MILLIGRAM(S): at 12:05

## 2022-01-01 RX ADMIN — Medication 975 MILLIGRAM(S): at 19:15

## 2022-01-01 RX ADMIN — Medication 6 UNIT(S): at 12:28

## 2022-01-01 RX ADMIN — HYDROMORPHONE HYDROCHLORIDE 4 MILLIGRAM(S): 2 INJECTION INTRAMUSCULAR; INTRAVENOUS; SUBCUTANEOUS at 06:07

## 2022-01-01 RX ADMIN — Medication 4 MILLIGRAM(S): at 12:11

## 2022-01-01 RX ADMIN — MORPHINE SULFATE 4 MILLIGRAM(S): 50 CAPSULE, EXTENDED RELEASE ORAL at 15:55

## 2022-01-01 RX ADMIN — Medication 650 MILLIGRAM(S): at 08:50

## 2022-01-01 RX ADMIN — Medication 3 MILLILITER(S): at 07:09

## 2022-01-01 RX ADMIN — Medication 4 MILLIGRAM(S): at 06:02

## 2022-01-01 RX ADMIN — Medication 1 PATCH: at 19:40

## 2022-01-01 RX ADMIN — SENNA PLUS 2 TABLET(S): 8.6 TABLET ORAL at 22:38

## 2022-01-01 RX ADMIN — Medication 125 MICROGRAM(S): at 05:48

## 2022-01-01 RX ADMIN — HYDROMORPHONE HYDROCHLORIDE 2 MILLIGRAM(S): 2 INJECTION INTRAMUSCULAR; INTRAVENOUS; SUBCUTANEOUS at 07:33

## 2022-01-01 RX ADMIN — LIDOCAINE 1 PATCH: 4 CREAM TOPICAL at 07:44

## 2022-01-01 RX ADMIN — Medication 4 MILLIGRAM(S): at 23:10

## 2022-01-01 RX ADMIN — Medication 4 MILLIGRAM(S): at 12:22

## 2022-01-01 RX ADMIN — Medication 100 MILLIGRAM(S): at 09:57

## 2022-01-01 RX ADMIN — Medication 4 MILLIGRAM(S): at 11:35

## 2022-01-01 RX ADMIN — Medication 100 MILLIGRAM(S): at 11:35

## 2022-01-01 RX ADMIN — Medication 4: at 18:34

## 2022-01-01 RX ADMIN — BUDESONIDE AND FORMOTEROL FUMARATE DIHYDRATE 2 PUFF(S): 160; 4.5 AEROSOL RESPIRATORY (INHALATION) at 09:01

## 2022-03-14 NOTE — ED ADULT TRIAGE NOTE - CHIEF COMPLAINT QUOTE
Pt brought in by EMS from NH for L sided flank pain and hematuria. Pt denies chest pain, sob, n/v/d, fever or chills.

## 2022-03-14 NOTE — ED PROVIDER NOTE - OBJECTIVE STATEMENT
Patient is a 67y M PMhx COPD, CAD (s/p 3 stents), kidney stones, Gout, presenting from nursing home with right sided flank pain x2days. Patient states he also has hematuria and some pain with urination. Right sided flank pain radiating to his lower abdomen. Constant pain, worse with coughing. Denies CP, SOB, fevers, vomiting, chills. Usually goes to [facility]. Was there yesterday with same concerns.

## 2022-03-14 NOTE — ED PROVIDER NOTE - PATIENT PORTAL LINK FT
You can access the FollowMyHealth Patient Portal offered by [facility] by registering at the following website: [email or web address]. By joining [facility]’s FollowMyHealth portal, you will also be able to view your health information using other applications (apps) compatible with our system.

## 2022-03-14 NOTE — ED PROVIDER NOTE - NSICDXPASTMEDICALHX_GEN_ALL_CORE_FT
PAST MEDICAL HISTORY:  CAD (coronary artery disease)     Chronic obstructive pulmonary disease (COPD)     Gout

## 2022-03-14 NOTE — ED ADULT NURSE REASSESSMENT NOTE - NS ED NURSE REASSESS COMMENT FT1
Break RN note- patient resting quietly in bed, breathing even and nonlabored. No acute distress. Patient reports he is feeling better. Safety maintained. Awaiting CT.  Patient stable upon exiting the room.

## 2022-03-14 NOTE — ED PROVIDER NOTE - ATTENDING CONTRIBUTION TO CARE
agree with resident note    "Patient is a 67y M PMhx COPD, CAD (s/p 3 stents), kidney stones, Gout, presenting from nursing home with right sided flank pain x2days. Patient states he also has hematuria and some pain with urination. Right sided flank pain radiating to his lower abdomen. Constant pain, worse with coughing. Denies CP, SOB, fevers, vomiting, chills. Usually goes to [facility]. Was there yesterday with same concerns."    states feels like prior kidney stones, went to OSH and was told he had a "kidney lesion" .  Denies fevers, vomiting.  States back pain, dysuria    PE:  well appearing; VSS; CTAB/L; s1 s2 no m/r/g abd soft/NT/ND back: mild CVA tenderness ext: no edema    Imp: likely given symptoms renal colic; labs, UA, CT and reassess

## 2022-03-14 NOTE — ED PROVIDER NOTE - PROGRESS NOTE DETAILS
Suly Caal MD PGY1: No kidney stone seen on CT. Multiple lesions in several bones, possible lung opacity, concern for malignancy. Will get chest CT to further characterize RLL opacity. Patient to likely need follow-up for new cancer. Patient has long smoking history, no cancer history. Suly Caal MD PGY1: Patient pain controlled at this time. informed patient of possible cancer, and that he will be scheduled with urgent oncology follow-up. Patient to go to CT chest shortly, and then be discharged following. Joseph Frankel PGY3: CT done. Will arrange follow up. Patient stable for dc. Discussed plan and return precautions with patient who understands and agrees. All questions answered.

## 2022-03-14 NOTE — ED PROVIDER NOTE - CLINICAL SUMMARY MEDICAL DECISION MAKING FREE TEXT BOX
Patient is a 67y M PMhx COPD, CAD (s/p 3 stents), kidney stones, Gout, presenting from nursing home with right sided flank pain x2days. R/o kidney stone. Patient afebrile, no vomiting, less likely pyelonephritis. Will get CT, UA, Labs, reassess.

## 2022-03-14 NOTE — ED PROVIDER NOTE - NSFOLLOWUPINSTRUCTIONS_ED_ALL_ED_FT
You were evaluated in the Emergency Department for right sided back and side pain.  You were evaluated and examined by a physician, and you had labs, imaging, chest xray.    Based on your evaluation:    There were several lesions found on your CT scan that may indicate cancer. You will need to follow-up with an oncologist to get a definitive diagnosis. They will call you for an appoint.     There are no signs of emergency conditions requiring admission to the hospital on today's workup.  Based on the evaluation, a presumptive diagnosis was made, however, further evaluation may be required by your primary care physician or a specialist for a more definitive diagnosis.  Therefore, please follow-up as directed or return to the Emergency Department if your symptoms change or worsen.    We recommend that you:  1. See your primary care physician within the next 72 hours for follow up.  Bring a copy of your discharge paperwork (including any test results) to your doctor.  2. Follow-up with hematology/oncology  3. Take Tylenol 500-1000mg every 4-6 hours as needed for pain.      *** Return immediately if you have worsening symptoms, worsening back pain, difficulty breathing, fevers for more than 5 days, or any other new/concerning symptoms. ***

## 2022-03-14 NOTE — ED PROVIDER NOTE - PHYSICAL EXAMINATION
GENERAL: no acute distress, non-toxic appearing  HEAD: normocephalic, atraumatic  HEENT: PERRLA, EOMI, normal conjunctiva, oral mucosa moist  CARDIAC: regular rate and rhythm  PULM: clear to ascultation bilaterally, diffuse rales and wheezing  GI: abdomen nondistended, soft, mild RLQ tenderness, no guarding or rebound tenderness  : +R CVA tenderness, no suprapubic tenderness  NEURO: alert and oriented x 3, normal speech, no focal motor or sensory deficits, gait normal  MSK: no visible deformities  SKIN: no visible rashes, dry, well-perfused  PSYCH: appropriate mood and affect

## 2022-03-30 PROBLEM — I25.10 ATHEROSCLEROTIC HEART DISEASE OF NATIVE CORONARY ARTERY WITHOUT ANGINA PECTORIS: Chronic | Status: ACTIVE | Noted: 2022-01-01

## 2022-03-30 PROBLEM — M10.9 GOUT, UNSPECIFIED: Chronic | Status: ACTIVE | Noted: 2022-01-01

## 2022-03-30 PROBLEM — J44.9 CHRONIC OBSTRUCTIVE PULMONARY DISEASE, UNSPECIFIED: Chronic | Status: ACTIVE | Noted: 2022-01-01

## 2022-04-01 PROBLEM — Z87.891 FORMER SMOKER: Status: ACTIVE | Noted: 2022-01-01

## 2022-04-04 NOTE — ASSESSMENT
[FreeTextEntry1] : Mr. DALIA ESTRADA, 67 year old male, Former smoker (1 PPD > 20 years; Quit approx 3 months ago), w/ hx of CAD, COPD, Gout, Hard of hearing, who recently presented to [facility] ED with complaints of right sided flank pain x 2 days as well as hematuria. \par \par CT Chest w/ IV contrast on 3/14/22:\par - scattered tiny clustered nodules and branching/tubular opacities, likely mucoid impaction\par - lucent lesions in the T7 and T8 vertebral bodies\par \par I have reviewed the patient's medical records and diagnostic images at time of this office consultation and have made the following recommendation:\par 1. CT chest demonstrating lucent lesions in the T7 and T8 vertebral bodies. Recommendation for MRI of thoracic spine to further evaluation. Return to clinic with results to discuss further plan of care. \par 2. Recommendations filled out for assisted living facility; Prescription given. \par \par Recommendations reviewed with patient during this office visit, and all questions answered; Patient instructed on the importance of follow up and verbalizes understanding.\par \par I personally performed the services described in the documentation, reviewed the documentation recorded by the scribe in my presence and it accurately and completely records my words and actions.\par \par I, Cristina Brennan ANP-C, am scribing for and the presence of HELADIO Bonilla, the following sections HISTORY OF PRESENT ILLNESS, PAST MEDICAL/FAMILY/SOCIAL HISTORY; REVIEW OF SYSTEMS; VITAL SIGNS; PHYSICAL EXAM; DISPOSITION.\par \par \par \par \par

## 2022-04-04 NOTE — HISTORY OF PRESENT ILLNESS
[FreeTextEntry1] : Mr. DALIA ESTRADA, 67 year old male, Former smoker (1 PPD > 20 years; Quit approx January, 2022), w/ hx of CAD, COPD, Gout, Hard of hearing, who recently presented to [facility] ED with complaints of right sided flank pain x 2 days as well as hematuria. \par \par CT Chest w/ IV contrast on 3/14/22:\par - scattered tiny clustered nodules and branching/tubular opacities, likely mucoid impaction\par - lucent lesions in the T7 and T8 vertebral bodies\par \par Patient is here today for CT Sx consultation, referred by [facility] ED. (Sister, Justen, accompanied via telephone) Today, patient endorses s/p course of antibiotic; Hematuria has resolved. Denies worsening SOB, chest pain, cough, hemoptysis, fever, chills, night sweats, lightheadedness or dizziness. [facility] \par \par

## 2022-04-04 NOTE — DATA REVIEWED
[FreeTextEntry1] : I have independently reviewed the following:\par CT Chest w/ IV contrast on 3/14/22

## 2022-04-04 NOTE — PHYSICAL EXAM
[Restricted in physically strenuous activity but ambulatory and able to carry out work of a light or sedentary nature] : Status 1- Restricted in physically strenuous activity but ambulatory and able to carry out work of a light or sedentary nature, e.g., light house work, office work [General Appearance - Alert] : alert [Sclera] : the sclera and conjunctiva were normal [PERRL With Normal Accommodation] : pupils were equal in size, round, and reactive to light [Extraocular Movements] : extraocular movements were intact [Outer Ear] : the ears and nose were normal in appearance [Neck Appearance] : the appearance of the neck was normal [Neck Cervical Mass (___cm)] : no neck mass was observed [Jugular Venous Distention Increased] : there was no jugular-venous distention [Respiration, Rhythm And Depth] : normal respiratory rhythm and effort [Exaggerated Use Of Accessory Muscles For Inspiration] : no accessory muscle use [Auscultation Breath Sounds / Voice Sounds] : lungs were clear to auscultation bilaterally [Heart Rate And Rhythm] : heart rate was normal and rhythm regular [Examination Of The Chest] : the chest was normal in appearance [Chest Visual Inspection Thoracic Asymmetry] : no chest asymmetry [Diminished Respiratory Excursion] : normal chest expansion [2+] : left 2+ [Breast Palpation Mass] : no palpable masses [Breast Appearance] : normal in appearance [Bowel Sounds] : normal bowel sounds [Abdomen Soft] : soft [Abdomen Tenderness] : non-tender [Cervical Lymph Nodes Enlarged Posterior Bilaterally] : posterior cervical [Cervical Lymph Nodes Enlarged Anterior Bilaterally] : anterior cervical [Supraclavicular Lymph Nodes Enlarged Bilaterally] : supraclavicular [No CVA Tenderness] : no ~M costovertebral angle tenderness [Abnormal Walk] : normal gait [No Spinal Tenderness] : no spinal tenderness [Nail Clubbing] : no clubbing  or cyanosis of the fingernails [Involuntary Movements] : no involuntary movements were seen [Musculoskeletal - Swelling] : no joint swelling seen [Skin Color & Pigmentation] : normal skin color and pigmentation [Skin Turgor] : normal skin turgor [] : no rash [No Focal Deficits] : no focal deficits [Oriented To Time, Place, And Person] : oriented to person, place, and time [FreeTextEntry1] : Deferred

## 2022-04-04 NOTE — CONSULT LETTER
[Consult Letter:] : I had the pleasure of evaluating your patient, [unfilled]. [( Thank you for referring [unfilled] for consultation for _____ )] : Thank you for referring [unfilled] for consultation for [unfilled] [Please see my note below.] : Please see my note below. [Consult Closing:] : Thank you very much for allowing me to participate in the care of this patient.  If you have any questions, please do not hesitate to contact me. [Sincerely,] : Sincerely, [FreeTextEntry3] : Guru Montgomery MD, MPH \par System Director of Thoracic Surgery \par Director of Comprehensive Lung and Foregut [place] \par Professor Cardiovascular & Thoracic Surgery  \par [facility] [employer] [facility]\par \par [facility]\par [address]\par Oncology [address]\[place] [address]\par Tel: [phone]\par Fax: [phone]\par

## 2022-05-09 NOTE — PHYSICAL EXAM
[Sclera] : the sclera and conjunctiva were normal [PERRL With Normal Accommodation] : pupils were equal in size, round, and reactive to light [Neck Appearance] : the appearance of the neck was normal [Neck Cervical Mass (___cm)] : no neck mass was observed [] : no respiratory distress [Respiration, Rhythm And Depth] : normal respiratory rhythm and effort [Auscultation Breath Sounds / Voice Sounds] : lungs were clear to auscultation bilaterally [Heart Rate And Rhythm] : heart rate was normal and rhythm regular [Heart Sounds] : normal S1 and S2 [Examination Of The Chest] : the chest was normal in appearance [Chest Visual Inspection Thoracic Asymmetry] : no chest asymmetry [2+] : left 2+ [No Abnormalities] : the abdominal aorta was not enlarged and no bruit was heard [No Pulse Delay] : no pulse delay [Bowel Sounds] : normal bowel sounds [Abdomen Soft] : soft [Abdomen Tenderness] : non-tender [Cervical Lymph Nodes Enlarged Posterior Bilaterally] : posterior cervical [Cervical Lymph Nodes Enlarged Anterior Bilaterally] : anterior cervical [Supraclavicular Lymph Nodes Enlarged Bilaterally] : supraclavicular [No CVA Tenderness] : no ~M costovertebral angle tenderness [No Spinal Tenderness] : no spinal tenderness [Musculoskeletal - Swelling] : no joint swelling seen [Sensation] : the sensory exam was normal to light touch and pinprick [No Focal Deficits] : no focal deficits [Oriented To Time, Place, And Person] : oriented to person, place, and time [Affect] : the affect was normal [Mood] : the mood was normal [Fingers] :  capillary refill of the fingers was normal

## 2022-05-09 NOTE — ASSESSMENT
[FreeTextEntry1] : Mr. DALIA ESTRADA, 67 year old male, Former smoker (1 PPD > 20 years; Quit approx January, 2022), w/ hx of CAD, COPD, Gout, Hard of hearing, who recently presented to [facility] ED with complaints of right sided flank pain x 2 days as well as hematuria. \par \par CT Chest w/ IV contrast on 3/14/22:\par - scattered tiny clustered nodules and branching/tubular opacities, likely mucoid impaction\par - lucent lesions in the T7 and T8 vertebral bodies\par \par MRI of thoracic spine on 5/1/2022:\par - multiple osseous lesions seen concerning for metastatic disease  vs.myeloma. \par - expansion of the Rt T5 pedicle causes mild to moderate right neural foraminal stenosis at T4-5 and moderate to severe right neural foraminal stenosis at T5-6. \par -muti level degenerative disc disease is present. \par -T5-6 demonstrates a large central disc protrusion causing moderate spinal canal stenosis with indentation of the ventral cords. There is a ? focal cord signal vs. motion artifact. \par \par I have reviewed the patient's medical records and diagnostic images at time of this office consultation and have made the following recommendation:\par 1. CT guided spinal biopsy with IR. \par 2. PET/CT for further evaluation.\par 3. MRI brain w w/o contrast. \par 4. Refer to oncologist Dr. Simental. \par 6. RTC after above done. \par \par \par I personally performed the services described in the documentation, reviewed the documentation recorded by the scribe in my presence and it accurately and completely records my words and actions. \par \par I, Scarlett Son, NP, am scribing for and the presence of HELADIO Bonilla, the following sections HISTORY OF PRESENT ILLNESS, PAST MEDICAL/FAMILY/SOCIAL HISTORY; REVIEW OF SYSTEMS; VITAL SIGNS; PHYSICAL EXAM; DISPOSITION.\par

## 2022-05-09 NOTE — HISTORY OF PRESENT ILLNESS
[FreeTextEntry1] : Mr. DALIA ESTRADA, 67 year old male, Former smoker (1 PPD > 20 years; Quit approx January, 2022), w/ hx of CAD, COPD, Gout, Hard of hearing, who recently presented to [facility] ED with complaints of right sided flank pain x 2 days as well as hematuria. \par \par CT Chest w/ IV contrast on 3/14/22:\par - scattered tiny clustered nodules and branching/tubular opacities, likely mucoid impaction\par - lucent lesions in the T7 and T8 vertebral bodies\par \par MRI of thoracic spine on 5/1/2022:\par - multiple osseous lesions seen concerning for metastatic disease  vs.myeloma. \par - expansion of the Rt T5 pedicle causes mild to moderate right neural foraminal stenosis at T4-5 and moderate to severe right neural foraminal stenosis at T5-6. \par -muti level degenerative disc disease is present. \par -T5-6 demonstrates a large central disc protrusion causing moderate spinal canal stenosis with indentation of the ventral cords. There is a ? focal cord signal vs. motion artifact. \par \par Pt presents today for follow up. He c/o abd pain and back pain, SOB on exertion , cough on and off. Denies fever, CP. \par \par

## 2022-05-09 NOTE — CONSULT LETTER
[FreeTextEntry3] : Guru Montgomery MD, MPH \par System Director of Thoracic Surgery \par Director of Comprehensive Lung and Foregut [place] \par Professor Cardiovascular & Thoracic Surgery  \par [facility] [employer] [facility]\par \par [facility]\par [address]\par Oncology [address]\[place] [address]\par Tel: [phone]\par Fax: [phone]\par

## 2022-06-06 PROBLEM — M89.9 RIB LESION: Status: ACTIVE | Noted: 2022-01-01

## 2022-06-16 PROBLEM — M89.9 LESION OF BONE OF THORACIC SPINE: Status: ACTIVE | Noted: 2022-01-01

## 2022-06-16 PROBLEM — Z80.1 FAMILY HISTORY OF LUNG CANCER: Status: ACTIVE | Noted: 2022-01-01

## 2022-06-16 NOTE — CONSULT LETTER
[Dear  ___] : Dear  [unfilled], [Consult Letter:] : I had the pleasure of evaluating your patient, [unfilled]. [Please see my note below.] : Please see my note below. [Consult Closing:] : Thank you very much for allowing me to participate in the care of this patient.  If you have any questions, please do not hesitate to contact me. [Sincerely,] : Sincerely, [FreeTextEntry2] : Dr. Guru Montgomery [FreeTextEntry3] : Yazan Simental MD\par \par

## 2022-06-16 NOTE — HISTORY OF PRESENT ILLNESS
[de-identified] : Marlo Napoles is a 68 y/o M (current smoker) w/ a PMHx of CAD s/p 3 stents (on Plavix), CVA, COPD, Gout, T2DM, HLD, OA, and hypothyroidism who presents for initial consultation regarding a concern for malignancy. \par \par Patient had been in his usual state of health until 3/2022 when he began to experience R flank pain which was associated with occasional hematuria. Patient presented to [facility] ED on 3/14/22 for these symptoms. A CT Renal Stone Munoz was performed which showed an irregular parenchymal opacity in the RLL of uncertain etiology, diffuse hepatic steatosis, no hydronephrosis or radiopaque renal calculi, indeterminate 1.1 cm hyperdense lesion projecting from the lower pole of the left kidney, and lucent lesions in the spine, left acetabulum, and left iliac bone of uncertain etiology. A CT Chest was then obtained which showed bronchial mucoid impaction, scattered tiny clustered nodules and \par branching/tubular opacities likely mucoid impaction, and lucent lesions in the T7 and T8 vertebral bodies which were indeterminate. Since patient's pain was improved, he was ultimately discharged from [facility] ED w/ plan for outpatient follow-up. He was referred to CT Surgery and an MRI T-spine was subsequently obtained on 5/1/22. This showed multiple osseous lesions concerning for metastatic disease versus myeloma, expansion of the right T5 pedicle causing mild to moderate right neural foraminal stenosis at T4-T5 and moderate to severe right neural foraminal stenosis at T5-T6, multilevel degenerative disc disease, and T5-T6 demonstrated a large central disc protrusion causing moderate spinal canal stenosis with indentation of the ventral cord. An MRI Brain was then obtained on 5/16/22 which showed multiple enhancing lesions in the skull base and calvarium, suspicious for malignancy such as metastases or multiple myeloma. There was no abnormal parenchymal enhancement. Patient then underwent a PET/CT on 5/21/22 which showed multiple FDG-avid lytic and difficult to delineate lesions in the axial skeleton which are compatible with malignancy, FDG-avid fracture, posterolateral aspect of right 10th rib, possibly pathologic, FDG-avid hyperdense 1.1 cm left lower pole renal lesion is suspicious for renal neoplasm, indeterminate FDG-avid focus mapping to a loop of colon versus small bowel within the midline of the anterior pelvis, and previously noted branching/tubular opacities in the bilateral lower lobes were improved. Patient visited IR earlier this month and plan is to obtain a biopsy of the R 10th rib lesion for further evaluation pending clearance. Patient was referred to Medical Oncology for further evaluation.\par \par Today, patient reports that he is feeling generally well. He reports that he can occasionally experience R flank pain, but it has been well controlled with Naproxen. He also states that he continues to experience occasional hematuria. He reports that he has a Urologist, but is unsure of the doctor's name. He states that he may be having a urologic procedure that is unrelated to this visit in the next few months. On ROS, he reports a chronic cough which is occasionally productive of a light green sputum. He states that he had a CVA in the early 2000s and he has poor vision in his L eye as a result. He endorses chronic NAIK related to his COPD. He reports decreased hearing in his R ear and tinnitus in his L ear which are chronic. No recent fevers, headache, chest pain, nausea, vomiting, changes in bowel habits, or abdominal pain. His weight has been stable and he has been tolerating PO intake. No other new complaints.\par \par Of note, patient is a current smoker (smoked for ~ 50 years, previously 1ppd, now ~ 1/2 ppd). No EtOH or illicit drug use. He currently lives at an Assisted Living Facility ([facility]) and has an associated aide. He has 1 brother and 2 sisters. Patient reports that his last colonoscopy was ~ 6 years ago and he believes it was normal at that time.

## 2022-06-16 NOTE — ASSESSMENT
[FreeTextEntry1] : Marlo Napoles is a 66 y/o M (current smoker) w/ a PMHx of CAD s/p 3 stents (on Plavix), CVA, COPD, Gout, T2DM, HLD, OA, and hypothyroidism who presents for initial consultation regarding a concern for malignancy.\par \par # Concern for malignancy\par - PET/CT (5/21/22): Multiple FDG-avid lytic and difficult to delineate lesions in the axial skeleton are compatible with malignancy. FDG-avid fracture, posterolateral aspect of right 10th rib, possibly pathologic. FDG-avid hyperdense 1.1 cm left lower pole renal lesion is suspicious for renal neoplasm. Indeterminate FDG-avid focus mapping to a loop of colon versus small bowel within the midline of the anterior pelvis. \par - MRI Brain (5/16/22): Multiple enhancing lesions in the skull base and calvarium, suspicious for malignancy such as metastases or multiple myeloma. No abnormal parenchymal enhancement.\par - Recent labs from 3/2022 were reviewed. No anemia, renal insufficiency, or hypercalcemia were seen. Total protein level was WNL. \par - The above imaging results were reviewed with the pt in detail. Imaging findings are concerning for an underlying malignancy such as multiple myeloma vs metastatic cancer (? RCC given renal lesion noted on PET/CT).\par - Pt visited IR earlier this month and plan is to obtain a biopsy of the R 10th rib lesion for further evaluation pending clearance. Will follow-up results\par - Pt reports that he has a Urologist, but is unsure of the doctor's name. Discussed importance of Urology follow-up for the L renal lesion. A contact number for our office was given to the pt to give to his doctor at his Assisted Living Facility in case there are any questions. \par - Pt reports that his last colonoscopy was ~ 6 years ago. Recommended that he follow-up with GI for a repeat colonoscopy given bowel findings noted on PET/CT. Pt stated understanding.\par - Will check labs today (including multiple myeloma workup)\par - Follow-up in 1-2 months to discuss above workup or sooner as-needed\par \par Case was discussed with Dr. Simental\par \par Ambrose Langford, PGY6\par Hematology-Oncology Fellow\par \par \par I was with the hematology/ oncology fellow, Dr. Langford for the entire visit and agree with above documentation\par

## 2022-06-16 NOTE — PHYSICAL EXAM
[Restricted in physically strenuous activity but ambulatory and able to carry out work of a light or sedentary nature] : Status 1- Restricted in physically strenuous activity but ambulatory and able to carry out work of a light or sedentary nature, e.g., light house work, office work [Normal] : affect appropriate [de-identified] : S

## 2022-06-16 NOTE — REVIEW OF SYSTEMS
[Vision Problems] : vision problems [Loss of Hearing] : loss of hearing [Cough] : cough [SOB on Exertion] : shortness of breath during exertion [Fever] : no fever [Chills] : no chills [Recent Change In Weight] : ~T no recent weight change [Eye Pain] : no eye pain [Dysphagia] : no dysphagia [Chest Pain] : no chest pain [Palpitations] : no palpitations [Abdominal Pain] : no abdominal pain [Vomiting] : no vomiting [Joint Pain] : no joint pain [Muscle Pain] : no muscle pain [Skin Rash] : no skin rash [Skin Wound] : no skin wound [Confused] : no confusion [Difficulty Walking] : no difficulty walking [Easy Bleeding] : no tendency for easy bleeding [Easy Bruising] : no tendency for easy bruising [FreeTextEntry3] : Chronic [FreeTextEntry6] : Chronic [FreeTextEntry4] : Chronic [FreeTextEntry8] : + Occasional R flank pain, + Occasional hematuria

## 2022-07-18 NOTE — H&P ADULT - NSICDXFAMILYHX_GEN_ALL_CORE_FT
FAMILY HISTORY:  Mother  Still living? Unknown  Family hx of lung cancer, Age at diagnosis: Age Unknown

## 2022-07-18 NOTE — ED ADULT NURSE NOTE - OBJECTIVE STATEMENT
Angel RN note- patient arrives to the ED for chest pain that happened earlier today and has since resolved. A&Ox4. Patient denies any headache, dizziness, chest pain, SOB, nausea, vomiting, or other complaints. Patient arrives from [facility]. Patient breathing even and nonlabored. Patient reports he is always congested because he has COPD. No acute distress. Labs obtained. COVID swab sent. Cardiac monitor in place- sinus rhythm. Primary RN given report and made aware patient needs an IV . Safety maintained. Patient stable upon exiting the room.

## 2022-07-18 NOTE — H&P ADULT - PROBLEM SELECTOR PLAN 1
Assessment:  - patient presented to the ED for new onset chest pain that resolved  - troponin 56, CKMB and CK level mildly elevated  - EKG shows RBBB, no ST changes noted  - currently chest pain free at this time  - CTA neg for PE    Plan:  - echo pending  - will c/w plavix for now  - low suspicion for ACS at this time as the patient is no longer reporting chest pain - hold heparin drip   - will start ASA 81  - cardiology consult - may benefit from stress test vs. diagnostic cath due to hx of CAD with stents Assessment:  - patient presented to the ED for new onset chest pain that resolved  - troponin 56, CKMB and CK level mildly elevated  - EKG shows RBBB, no ST changes noted  - currently chest pain free at this time  - CTA neg for PE    Plan:  - echo pending  - will c/w plavix for now  - low suspicion for ACS at this time as the patient is no longer reporting chest pain - hold heparin drip   - will start ASA 81, will also give loading dose ASA  - cardiology consult - may benefit from stress test vs. diagnostic cath due to hx of CAD with stents Assessment:  - patient presented to the ED for new onset chest pain that resolved  - troponin 56, CKMB and CK level mildly elevated  - EKG shows RBBB, no ST changes noted  - currently chest pain free at this time  - CTA neg for PE    Plan:  - echo pending  - will c/w plavix for now  - patient not reporting chest pain, trop troponins rising with no evidence of LARISA - start heparin drip   - will start ASA 81, will also give loading dose ASA  - cardiology consult - may benefit from stress test vs. diagnostic cath due to hx of CAD with stents and active smoker

## 2022-07-18 NOTE — ED PROVIDER NOTE - ATTENDING CONTRIBUTION TO CARE
68 yo M hx CAD s/p 2 stents on ASA, DM2, COPD, hypothyroidism, presenting with complaints of chest pain that started while eating today followed by syncopal event. Pain lasted about 10-15 minutes. No associated nausea/vomiting or diaphoresis. Reports neck pain s/p fall. No focal weakness/numbness/tingling. No visual changes or nausea/vomiting. On exam rhonchi b/l bases. No JVD/pitting edema. + trop 56-- tba

## 2022-07-18 NOTE — ED PROVIDER NOTE - NS ED ROS FT
GENERAL: No fever, no chills  	EYES: No change in vision  	HEENT: No trouble swallowing or speaking  	CARDIAC: No chest pain  	PULMONARY: No cough, no SOB  	GI: No abdominal pain, no nausea, no vomiting, no diarrhea, no constipation  	: No changes in urination  	SKIN: No rashes  	NEURO: No headache, no numbness  	MSK: No visible bony deformity   Otherwise as HPI or negative.

## 2022-07-18 NOTE — ED PROVIDER NOTE - PROGRESS NOTE DETAILS
d-dimer elevated. chest pain and unilateral calf pain on L. CTA chest pain no sign of DVT on LLE according to the US read. notified of troponin elevated at 56. will recheck trop at 3 hrs. notified of troponin elevated at 56. will recheck trop at 3 hrs. discussed with the patient. amenable to staying in the hospital for further work up. CT head non-con negative for active hemorrahge, cervical negative for fracture, CTA chest negative for PE Spoke to Dr. Vyas. Telemetry will accept.

## 2022-07-18 NOTE — H&P ADULT - HISTORY OF PRESENT ILLNESS
This is a 66 y/o M with pmhx of DM, CAD, COPD, Hypothyroidism, GERD presented to the ED for new onset chest pain. Known to have CAD with 3 stents, last done 15 years ago. The patient is an overall poor historian/poor insight with his medical disease. He reports that yesterday morning he went out for a walk, walked 10 blocks then came home. He sat down to eat food then felt sudden sharp-like chest pain which lasted for 5 minutes, then he felt dizzy and reported have "passed out" and woke up quickly. The patient initially reported that he did not lose consciousness but changed his story. He also denies falling when he fainted but then started describing that he fell off his bed (?) and hit his neck on the side of the bed frame. + mild neck tenderness. The patient reports he has extensive follow up with his cardiologist and pulmonologist. The patient also reports chest pain is better at bedside. Denies shortness of breath. Denies chest pain with exertion. He reports chronically having dysuria and is pending a urological surgery which kept getting delayed. Denies fevers at this time.    The patient became upset when I told him that he will be admitted. He states that he feels fine and wants to go home, he does not want to stay. I asked him how he will go back to the assisted living facility at this time. He does not know. So he decided to stay for one night, but wants to leave AMA in the morning.

## 2022-07-18 NOTE — H&P ADULT - ASSESSMENT
68 y/o M with pmhx of DM, CAD, COPD, Hypothyroidism, GERD presented to the ED for new onset chest pain. Known to have CAD with 3 stents, last done 15 years ago. Troponins mildly elevated but the patient denies acute chest pain at this time. Admit for ACS work up.

## 2022-07-18 NOTE — H&P ADULT - NSHPREVIEWOFSYSTEMS_GEN_ALL_CORE
REVIEW OF SYSTEMS:    CONSTITUTIONAL: No weakness, fevers or chills, + lightheadedness/dizziness  EYES/ENT: No visual changes;  No dysphagia; No sore throat; No rhinorrhea; No sinus pain/pressure  NECK: + mild pain, no stiffness  RESPIRATORY: No cough, wheezing, hemoptysis; No shortness of breath  CARDIOVASCULAR: + chest pain, no palpitations; No lower extremity edema  GASTROINTESTINAL: No abdominal or epigastric pain. No nausea, vomiting, or hematemesis; No diarrhea or constipation. No melena or hematochezia.  GENITOURINARY: + dysuria, no change in frequency or hematuria  NEUROLOGICAL: No numbness or weakness  MSK: ambulates without assistance  SKIN: No itching, burning, rashes, or lesions   All other review of systems is negative unless indicated above.

## 2022-07-18 NOTE — ED PROVIDER NOTE - CLINICAL SUMMARY MEDICAL DECISION MAKING FREE TEXT BOX
This is a 66 yo male with PMH of DM2, CAD 2/ stents x2, COPD, hypothyroidism, and acid reflux with chief complaint of chest pain with dizziness. The sharp chest pain has lasted about 10-15 mins and since resolved. EKG shows right bundle branch block with no acute ST changes. On physical exam, there are bilateral crackles and tenderness on his L calf. Will work up for ACS, PE, new onset CHF. This is a 66 yo male with PMH of DM2, CAD 2/ stents x2 on aspirin and plavix, COPD, hypothyroidism, and acid reflux with chief complaint of chest pain with dizziness. The sharp chest pain has lasted about 10-15 mins and since resolved. EKG shows right bundle branch block with no acute ST changes. On physical exam, there are bilateral crackles and tenderness on his L calf. Differentials include ACS, PE, new onset CHF, GERD. Will work up with CBC, CMP, trop, chest x-ray, d-dimer. This is a 68 yo male with PMH of DM2, CAD 2/ stents x2 on aspirin and plavix, COPD, hypothyroidism, and acid reflux with chief complaint of chest pain with dizziness. The sharp chest pain has lasted about 10-15 mins and since resolved. EKG shows right bundle branch block with no acute ST changes. On physical exam, there are bilateral crackles and tenderness on his L calf. Differentials include ACS, PE, new onset CHF, GERD. Will work up with CBC, CMP, trop, chest x-ray, d-dimer. also added CT head non con due to history of syncope with this episode of chest pain and neck pain. This is a 66 yo male with PMH of DM2, CAD 2/ stents x2 on aspirin and plavix, COPD, hypothyroidism, and acid reflux with chief complaint of chest pain with dizziness. The sharp chest pain has lasted about 10-15 mins and since resolved. EKG shows right bundle branch block with ST depression on V2 and V3. On physical exam, there are bilateral crackles and tenderness on his L calf. Differentials include ACS, PE, new onset CHF, GERD. Will work up with CBC, CMP, trop, chest x-ray, d-dimer. also added CT head non con due to history of syncope with this episode of chest pain and neck pain.

## 2022-07-18 NOTE — ED ADULT NURSE NOTE - CHIEF COMPLAINT QUOTE
pt brought in by EMS from [facility] c/o chest pain and dizziness, since resolved. pt currently has no complaints. PMHx- DM2, CAD w/stents x2,  HTN, HLD,

## 2022-07-18 NOTE — ED ADULT NURSE REASSESSMENT NOTE - NS ED NURSE REASSESS COMMENT FT1
Report and pt recieevd at shift change: pt A&Ox4 respirations even and unlabored, sating 100% on RA, NSR on monitor. pt offers no complaints at this time. IV site patent - no redness or swelling. Pt well appearing. Rpt troponin sent per order. bed in lowest position, side rails up, pt aware to call for help before getting up. Pending lab results.

## 2022-07-18 NOTE — H&P ADULT - NSHPLABSRESULTS_GEN_ALL_CORE
15.3   8.41  )-----------( 160      ( 18 Jul 2022 18:00 )             46.9       07-18    135  |  101  |  23  ----------------------------<  100<H>  4.3   |  25  |  1.22    Ca    8.8      18 Jul 2022 22:03    TPro  9.5<H>  /  Alb  3.6  /  TBili  0.4  /  DBili  x   /  AST  58<H>  /  ALT  25  /  AlkPhos  61  07-18      CARDIAC MARKERS ( 18 Jul 2022 22:03 )  x     / x     / 241 U/L / x     / 9.2 ng/mL

## 2022-07-18 NOTE — H&P ADULT - PROBLEM SELECTOR PLAN 2
Assessment:  - patient reported to have syncopized during the chest pain episode  - Ct head neg for acute pathology  - EKG shows RBBB    Plan:  - echo pending  - tele monitoring  - cardiology consult/work up as above  - orthostatics hypotension work up for cause of syncope  - will c/w low dose metoprolol for now

## 2022-07-18 NOTE — ED PROVIDER NOTE - CARDIAC, MLM
Normal rate, regular rhythm.  Heart sounds S1, S2.  No murmurs, rubs or gallops. Normal rate, regular rhythm.  Heart sounds S1, S2.  No murmurs, rubs or gallops. No LE edema. Tenderness to touch L calf.

## 2022-07-18 NOTE — H&P ADULT - NSHPPHYSICALEXAM_GEN_ALL_CORE
PHYSICAL EXAM:  VITALS: Vital Signs Last 24 Hrs  T(C): 36.7 (18 Jul 2022 21:35), Max: 36.7 (18 Jul 2022 14:21)  T(F): 98.1 (18 Jul 2022 21:35), Max: 98.1 (18 Jul 2022 21:35)  HR: 60 (18 Jul 2022 21:35) (55 - 64)  BP: 128/66 (18 Jul 2022 21:35) (99/70 - 128/66)  BP(mean): --  RR: 16 (18 Jul 2022 21:35) (16 - 16)  SpO2: 100% (18 Jul 2022 21:35) (95% - 100%)    Parameters below as of 18 Jul 2022 21:35  Patient On (Oxygen Delivery Method): room air      GENERAL: NAD, well-developed, well-nourished  HEAD:  Atraumatic, Normocephalic  EYES: EOMI, PERRL, conjunctiva and sclera clear  ENT: Moist Mucus Membranes present, no ulcers appreciated  NECK: Supple, No JVD  CHEST/LUNG: Clear to auscultation bilaterally; b/l decreased air entry, no accessory muscle use, no in respiratory distress  HEART: Regular rate and rhythm; No murmurs, rubs, or gallops, (+)S1, S2  ABDOMEN: Soft, Nontender, Nondistended; Normal Bowel sounds   EXTREMITIES:  2+ Peripheral Pulses, No clubbing, cyanosis, or edema  PSYCH: normal mood and affect  NEUROLOGY: AAOx3, non-focal  SKIN: No rashes or lesions PHYSICAL EXAM:  VITALS: Vital Signs Last 24 Hrs  T(C): 36.7 (18 Jul 2022 21:35), Max: 36.7 (18 Jul 2022 14:21)  T(F): 98.1 (18 Jul 2022 21:35), Max: 98.1 (18 Jul 2022 21:35)  HR: 60 (18 Jul 2022 21:35) (55 - 64)  BP: 128/66 (18 Jul 2022 21:35) (99/70 - 128/66)  BP(mean): --  RR: 16 (18 Jul 2022 21:35) (16 - 16)  SpO2: 100% (18 Jul 2022 21:35) (95% - 100%)    Parameters below as of 18 Jul 2022 21:35  Patient On (Oxygen Delivery Method): room air      GENERAL: NAD, well-developed, well-nourished  HEAD:  Atraumatic, Normocephalic  EYES: EOMI, PERRL, conjunctiva and sclera clear  ENT: Moist Mucus Membranes present, no ulcers appreciated  NECK: Supple, No JVD, full ROM of the neck  CHEST/LUNG: Clear to auscultation bilaterally; b/l decreased air entry, no accessory muscle use, no in respiratory distress  HEART: Regular rate and rhythm; No murmurs, rubs, or gallops, (+)S1, S2  ABDOMEN: Soft, Nontender, Nondistended; Normal Bowel sounds   EXTREMITIES:  2+ Peripheral Pulses, No clubbing, cyanosis, or edema  PSYCH: normal mood and affect  NEUROLOGY: AAOx3, non-focal  SKIN: No rashes or lesions

## 2022-07-18 NOTE — ED PROVIDER NOTE - NSICDXPASTMEDICALHX_GEN_ALL_CORE_FT
PAST MEDICAL HISTORY:  CAD (coronary artery disease)     Chronic obstructive pulmonary disease (COPD)     GERD (gastroesophageal reflux disease)     Gout     Hypothyroidism

## 2022-07-18 NOTE — ED PROVIDER NOTE - OBJECTIVE STATEMENT
This is a 68 yo male with PMH of DM2, CAD 2/ stents x2, COPD, hypothyroidism, and acid reflux with chief complaint of chest pain with dizziness. He reports that 2 hours ago he felt a sharp chest pain with no radiation that lasted about 10-15 minutes and since resolved spontaneously. He has a baseline SOB from his COPD and he was not more short of breath than normal today. He denies any n/v/d, abdominal pain, or urinary complaints. This is a 68 yo male with PMH of DM2, CAD 2/ stents x2 on aspirin and plavix, COPD, hypothyroidism, and acid reflux with chief complaint of chest pain with dizziness. He reports that 2 hours ago he felt a sharp chest pain with no radiation that lasted about 10-15 minutes and since resolved spontaneously. He has a baseline SOB from his COPD and he was not more short of breath than normal today. He denies any fever, cough, n/v/d, abdominal pain, or urinary complaints. This is a 66 yo male with PMH of DM2, CAD 2/ stents x2 on aspirin and plavix, COPD, hypothyroidism, and acid reflux with chief complaint of chest pain with dizziness. He reports that 2 hours ago he felt a sharp chest pain with no radiation that lasted about 10-15 minutes and since resolved spontaneously. With this episode, he had a pre-syncopal episode without loss of consciousness. He denies any direct trauma/injuries to the head. He has a baseline SOB from his COPD and he was not more short of breath than normal today. He denies any fever, cough, n/v/d, abdominal pain, or urinary complaints.

## 2022-07-18 NOTE — ED ADULT TRIAGE NOTE - CHIEF COMPLAINT QUOTE
pt brought in by EMS from [facility] c/o chest pain and dizziness, since resolved. pt currently has no complaints. PMHx- DM2, CAD w/stents x2,  HTN, HLD,

## 2022-07-19 NOTE — DISCHARGE NOTE PROVIDER - NSDCMRMEDTOKEN_GEN_ALL_CORE_FT
allopurinol 100 mg oral tablet: 1 tab(s) orally once a day  atorvastatin 80 mg oral tablet: 1 tab(s) orally once a day  Atrovent HFA 17 mcg/inh inhalation aerosol: 2 puff(s) inhaled 2 times a day  Breo Ellipta 200 mcg-25 mcg/inh inhalation powder: 1 puff(s) inhaled once a day  clopidogrel 75 mg oral tablet: 1 tab(s) orally once a day  ipratropium-albuterol 0.5 mg-2.5 mg/3 mL inhalation solution: 3 milliliter(s) inhaled 3 times a day  Januvia 100 mg oral tablet: 1 tab(s) orally once a day  Lantus 100 units/mL subcutaneous solution: 15 unit(s) subcutaneous once a day (at bedtime)  levothyroxine 125 mcg (0.125 mg) oral capsule: 1 cap(s) orally once a day  lidocaine 5% patch: 1 application transdermal once a day  Metoprolol Succinate ER 25 mg oral tablet, extended release: 0.5 tab(s) orally once a day  naproxen-esomeprazole 500 mg-20 mg oral enteric coated tablet: 1 tab(s) orally 2 times a day  NovoLOG FlexPen 100 units/mL injectable solution: sliding scale  tamsulosin 0.4 mg oral capsule: 1 cap(s) orally once a day  traZODone 150 mg oral tablet: 1 tab(s) orally once a day (at bedtime)  Ventolin HFA 90 mcg/inh inhalation aerosol: 2 puff(s) inhaled every 6 hours, As Needed   allopurinol 100 mg oral tablet: 1 tab(s) orally once a day  aspirin 81 mg oral delayed release tablet: 1 tab(s) orally once a day  atorvastatin 80 mg oral tablet: 1 tab(s) orally once a day  Atrovent HFA 17 mcg/inh inhalation aerosol: 2 puff(s) inhaled 2 times a day  Breo Ellipta 200 mcg-25 mcg/inh inhalation powder: 1 puff(s) inhaled once a day  clopidogrel 75 mg oral tablet: 1 tab(s) orally once a day  ipratropium-albuterol 0.5 mg-2.5 mg/3 mL inhalation solution: 3 milliliter(s) inhaled 3 times a day  Januvia 100 mg oral tablet: 1 tab(s) orally once a day  Lantus 100 units/mL subcutaneous solution: 15 unit(s) subcutaneous once a day (at bedtime)  levothyroxine 125 mcg (0.125 mg) oral capsule: 1 cap(s) orally once a day  lidocaine 5% patch: 1 application transdermal once a day  Metoprolol Succinate ER 25 mg oral tablet, extended release: 0.5 tab(s) orally once a day  NovoLOG FlexPen 100 units/mL injectable solution: sliding scale  tamsulosin 0.4 mg oral capsule: 1 cap(s) orally once a day  traZODone 150 mg oral tablet: 1 tab(s) orally once a day (at bedtime)  Ventolin HFA 90 mcg/inh inhalation aerosol: 2 puff(s) inhaled every 6 hours, As Needed

## 2022-07-19 NOTE — ED ADULT NURSE REASSESSMENT NOTE - NS ED NURSE REASSESS COMMENT FT1
Pt A&Ox4, respirations even and unlabored, sating 100% on RA, NSR on monitor. pt well appearing, offers no complaints at this time. Repeat labs sent per order. Report given to [id number] RN.

## 2022-07-19 NOTE — DISCHARGE NOTE PROVIDER - NSDCCPCAREPLAN_GEN_ALL_CORE_FT
PRINCIPAL DISCHARGE DIAGNOSIS  Diagnosis: Acute chest pain  Assessment and Plan of Treatment: You were admitted for chest pain. Your cardiac enzymes (blood work to show heart damage) was a little high. You were recommended to get a cardiac angiogram which you refused for now. The  risks are further chest pain, with worseing damage. For now please take asprin, plavix and lipitor as prescribed. If you experience chest pain, shortness of breath, palpitation, increased heart rate, palpitations, chest pain, dizziness, or shortness of breath - Return to emergency room if these signs/symptoms are present.      SECONDARY DISCHARGE DIAGNOSES  Diagnosis: Hypothyroidism  Assessment and Plan of Treatment: Continue meds as directed. Please follow up with your primary care physician regarding your hospitalization      Diagnosis: DM2 (diabetes mellitus, type 2)  Assessment and Plan of Treatment: Monitor finger sticks pre-meal and bedtime, low salt, fat and carbohydrate diet, minimize glucose intake.  Exercise daily for at least 30 minutes and weight loss.  Follow up with primary care physician and endocrinologist for routine Hemoglobin A1C checks and management.  Follow up with your ophthalmologist for routine yearly vision exams.      Diagnosis: CAD (coronary artery disease)  Assessment and Plan of Treatment: Continue aspirin and blood thinner, do not stop unless instructed by your physician.  Continue low salt, fat, cholesterol and carbohydrate diet. Follow up with cardiologist and primary care physician's routine appointment.      Diagnosis: COPD, severe  Assessment and Plan of Treatment: Continue your inhalers and annual pulmonary function tests with your outpatient provider. Monitor for asthma exacerbation, such as, shortness of breath, hyperventilation, or any other difficulties breathing and report to the emergency room in the event that your rescue inhaler has not resolved your symptoms.

## 2022-07-19 NOTE — DISCHARGE NOTE PROVIDER - HOSPITAL COURSE
66 y/o M with pmhx of DM, CAD, COPD, Hypothyroidism, GERD presented to the ED for new onset chest pain. Known to have CAD with 3 stents, last done 15 years ago. Troponins mildly elevated but the patient denies acute chest pain at this time. Admit for ACS work up.    Unstable angina pectoris.   - patient presented to the ED for new onset chest pain that resolved  - troponin 56, CKMB and CK level mildly elevated  - EKG shows RBBB, no ST changes noted  - currently chest pain free at this time  - CTA neg for PE  - TTE:  -7/19 pt refusing cath, risks/benefits all explained. Will continue medical management   with DAPT and lipitor     Syncope.   - patient reported to have syncopized during the chest pain episode  - Ct head neg for acute pathology  - EKG shows RBBB  - TTE:  - orthostatic:   - c/w low dose metoprolol for now.    Accident due to mechanical fall without injury.   - PT eval:   - CT cervical spine wnl, no fractures.    CAD (coronary artery disease).   start DAPT  7/19: -7/19 pt refusing cath, risks/benefits all explained. Will continue medical management   with DAPT and lipitor       Hypothyroidism.   - c/w synthroid.    COPD, severe.   - c/w inhalers  - not in exacerbation at this time.    DM  -c/w home regimen  - A1c: 6.4    Case discussed with Dr. Vyas on 7/19/22 and patient cleared for discharge. Reviewed discharge medications with patient; All new medications requiring new prescription sent to pharmacy of patients choice. Reviewed need for prescription for previous home medications and new prescriptions sent if requested. Patient in agreement and understands.        66 y/o M with pmhx of DM, CAD, COPD, Hypothyroidism, GERD presented to the ED for new onset chest pain. Known to have CAD with 3 stents, last done 15 years ago. Troponins mildly elevated but the patient denies acute chest pain at this time. Admit for ACS work up.    Unstable angina pectoris.   - patient presented to the ED for new onset chest pain that resolved  - troponin 56, CKMB and CK level mildly elevated  - EKG shows RBBB, no ST changes noted  - currently chest pain free at this time  - CTA neg for PE  - TTE: mod MR, normal LVSD  -7/19 pt refusing cath, risks/benefits all explained. Will continue medical management   with DAPT and lipitor     Syncope.   - patient reported to have syncopized during the chest pain episode  - Ct head neg for acute pathology  - EKG shows RBBB  - TTE: mod MR, normal LVSD  - c/w low dose metoprolol for now.    Accident due to mechanical fall without injury.   - CT cervical spine wnl, no fractures.    CAD (coronary artery disease).   start DAPT  7/19: -7/19 pt refusing cath, risks/benefits all explained. Will continue medical management   with DAPT and lipitor   TTE: mod MR, normal LVSD    Hypothyroidism.   - c/w synthroid.    COPD, severe.   - c/w inhalers  - not in exacerbation at this time.    DM  -c/w home regimen  - A1c: 6.4    Case discussed with Dr. Vyas on 7/19/22 and patient cleared for discharge. Reviewed discharge medications with patient; All new medications requiring new prescription sent to pharmacy of patients choice. Reviewed need for prescription for previous home medications and new prescriptions sent if requested. Patient in agreement and understands.        68 y/o M with pmhx of DM, CAD, COPD, Hypothyroidism, GERD presented to the ED for new onset chest pain. Known to have CAD with 3 stents, last done 15 years ago. Troponins mildly elevated but the patient denies acute chest pain at this time. Admit for ACS work up.    Unstable angina pectoris.   - patient presented to the ED for new onset chest pain that resolved  - troponin 56, CKMB and CK level mildly elevated  - EKG shows RBBB, no ST changes noted  - currently chest pain free at this time  - CTA neg for PE  - TTE: mod MR, normal LVSD  -7/19 pt refusing cath, risks/benefits all explained. Will continue medical management   with DAPT and lipitor     Syncope.   - patient reported to have syncopized during the chest pain episode  - Ct head neg for acute pathology  - EKG shows RBBB  - TTE: mod MR, normal LVSD  - c/w low dose metoprolol for now.    Accident due to mechanical fall without injury.   - CT cervical spine wnl, no fractures.    CAD (coronary artery disease).   start DAPT  7/19: -7/19 pt refusing cath, risks/benefits all explained. Will continue medical management   with DAPT and lipitor   TTE: mod MR, normal LVSD    Hypothyroidism.   - c/w synthroid.    COPD, severe.   - c/w inhalers  - not in exacerbation at this time.    DM  -c/w home regimen  - A1c: 6.4    Case discussed with Dr. Vyas on 7/19/22 and patient cleared for discharge. Reviewed discharge medications with patient; All new medications requiring new prescription sent to pharmacy of patients choice. Reviewed need for prescription for previous home medications and new prescriptions sent if requested. Patient in agreement and understands.       ATTENDING NOTE:  Patient seen and examined in the ED.  Labs reviewed, consistent with NSTEMI.  Cardiac troponins have trended down.  Patient is hemodynamically stable and reports being asymptomatic.  Patient is refusing further care and does not want cardiac catheterization -- wants to be discharged today.  He reports that he has his own cardiologist and will see him after discharge.  Will recommend conservative medical management  ie, DAPT, high-intensity atorvastatin, BB, ARB and advise that patient see his own cardiologist within one week after discharge.

## 2022-07-19 NOTE — PROVIDER CONTACT NOTE (OTHER) - BACKGROUND
SW was contacted by medical provider requesting assistance with DC planning. Pt from [facility] and requires transportation.

## 2022-07-19 NOTE — CHART NOTE - NSCHARTNOTEFT_GEN_A_CORE
Patient noted to have rise in troponin from 56 to 71. patient seen at bedside by provider. Patient in NAD. Patient denies chest pain, palpitations, SOB, diaphoresis, GI upset. Patient states he feels fine and would like to leave tomorrow.     ICU Vital Signs Last 24 Hrs  T(C): 36.4 (19 Jul 2022 01:23), Max: 36.9 (19 Jul 2022 00:17)  T(F): 97.5 (19 Jul 2022 01:23), Max: 98.4 (19 Jul 2022 00:17)  HR: 62 (19 Jul 2022 01:23) (55 - 64)  BP: 117/82 (19 Jul 2022 01:23) (99/70 - 139/88)  BP(mean): --  ABP: --  ABP(mean): --  RR: 15 (19 Jul 2022 01:23) (15 - 16)  SpO2: 96% (19 Jul 2022 01:23) (95% - 100%)    O2 Parameters below as of 19 Jul 2022 01:23  Patient On (Oxygen Delivery Method): room air    General: NAD   Cards: S1/S2, no murmurs   Pulm: CTA bilaterally. No wheezes.   Abdomen: Soft, NTND. BS (+)   Extremities: No pedal edema. MARCELINA of BL upper and lower extremities.  Neurology: AOx3 with no focal neurological deficits     EKG- 52bpm, RBBB    Plan:  -next set of cardiac enzymes 4:00  -EKG and Cardiac enzymes PRN for chest pain  -Case discussed w/ attending Dr STEWART -cw plavix, ASA load, hep gtt, NPO for cath in am

## 2022-07-19 NOTE — DISCHARGE NOTE PROVIDER - NSDCFUSCHEDAPPT_GEN_ALL_CORE_FT
Yazan Simental  [facility]  Alexys White  Scheduled Appointment: 08/16/2022

## 2022-07-19 NOTE — DISCHARGE NOTE PROVIDER - NSRESEARCHGRANT_HIDDEN_GEN_A_CORE
Subjective


Progress Note Date: 11/16/20





Morenita Ramirez, is a 57-year-old female who presented to [facility] emergency room with a chief complaint of mental status changes with 

worsening somnolence and episodes of vomiting, she was evaluated in the 

emergency room, vital examination on presentation reveals a temperature of 97.9 

pulse 85 respiration 18 blood pressure 174/74 pulse ox 93% on room air, white 

blood count was 10.1 hemoglobin 9.9 platelet count 196 sodium 136 BUN 21 

creatinine 1.12 glucose 113 lactic acid 1.2 urine analysis revealed trace 

leukocyte esterase with moderate bacteria, chest x-ray revealed no acute pul

monary process, computed tomography scan of the brain revealed no evidence of 

acute intracranial hemorrhage or midline shift there was a large area of old 

encephalomalacic of the right anterior cerebral hemisphere related to previous 

stroke.  Patient was admitted to telemetry floor neurology consultation was 

requested due to concern of a recurrent stroke.


Patient has a known history of insulin-dependent diabetes mellitus type 1 

history of hypertension, history of hyperlipidemia, history of seizure disorder,

history of asthma, and history of psychosis.





On 11/08/2020 patient is alert and oriented resting in bed patient does appear 

restless.  CT of abdomen and pelvis has been ordered due to nausea and vomiting.

 Hemoglobin 8.2.  GI and neurology services are following.  Patient denies any 

chest pain or shortness of breath.  Does have appetite requesting food.





on 11/9/2020 patient was seen and examined on the medical floor she is alert 

confused and occasionally agitated she received a dose of Haldol earlier today 

her hemoglobin is down and gastroenterology planning EGD and colonoscopy shireen 

further review of system was possible today.





On 11/10/2020 agent was seen and examined on the medical floor she is alert, 

confused in no apparent distress, there is no fever or chills no headache or 

dizziness no chest pain no shortness of breath no cough no nausea or vomiting no

abdominal pain no agitation today, patient underwent EGD today, she has AVM 

which was treated she will be given IV iron, sister Noelle contacted and 

multiple medical issues discussed in details over the form.








On 11/11/2020 patient is currently sleepy will wake up to follow commands but 

falls back to sleep clinic DC Haldol and continue to monitor for 24 hours 

possible discharge tomorrow








On 11/12/2020 patient was seen and examined on the medical floor she was 

somnolent, she had low blood pressure she was given a fluid bolus, at this point

Ativan and Xanax were discontinued Haldol was discontinued yesterday will 

continue was Seroquel only and order Ativan as needed if patient gets agitated 

patient improved significantly with IV fluid bolus blood pressure is back to 

normal range.





On 11/13/2020 patient remains sleepy but per nursing staff has been up and 

awake.  Patient did have elevated temperatures throughout night 100.7.  Dr. Snyder has been consulted for wound on leg.  Repeat urinary analysis has been 

ordered.  Patient did have episode of hypotension this has resolved.  Patient 

denies any chest pain or shortness breath.  Patient denies nausea vomiting or 

diarrhea.  Patient denies any urinary burning or frequency.





On 11/14/2020 patient was seen and examined on the medical floor, she is somn

olent, in no apparent distress at this time, her nurse reports that, patient 

slept well all night, however when she was awake she was screaming, she kept on 

screaming until she was tired and fell asleep again, patient is accepting her 

meals and her oral medications in some apple sauce with one on one help.  I am 

unable to do any review of systems at this time.  Ativan and Xanax and Haldol 

were discontinued due to continue was sleeping.  At this time will give Seroquel

25 mg in the morning, 25 mg in the early afternoon,  and 50 mg at bedtime.  Her 

sister Noelle who is the power of  was contacted multiple times during 

this admission, CODE STATUS was discussed including possibility of hospice care,

her sister stated that she will discuss that with patient's children and let us 

know their decision.





On 11/15/2020 patient remains somnolent.  Per nursing staff does wake up and 

screams.  Per nursing still trying to make decision in regards to hospice.  

Awaiting callback from power of  kidneys after she talks to family 

patient is a no code.  She remains on daptomycin Dr. Dash has been consulted 

per infectious disease recommendation for possible toe amputation.





On 11/16/2020 patient was seen and examined on the medical floor she is 

somnolent but arousable in no distress, there is no fever or chills hemoglobin 

is down to 6.5 today 1 unit of red blood cell transfusion was ordered he was 

evaluated by Dr. Ferrell and patient will need amputation of her toe, otherwise

no significant change in condition since yesterday





Objective





- Vital Signs


Vital signs: 


                                   Vital Signs











Temp  98 F   11/16/20 16:52


 


Pulse  75   11/16/20 16:52


 


Resp  18   11/16/20 16:52


 


BP  137/80   11/16/20 16:52


 


Pulse Ox  100   11/16/20 16:52








                                 Intake & Output











 11/15/20 11/16/20 11/16/20





 18:59 06:59 18:59


 


Intake Total 1200 912.5 310


 


Balance 1200 912.5 310


 


Intake:   


 


  Intake, IV Titration 1200 912.5 





  Amount   


 


    DAPTOmycin 350 mg In  50 





    Sodium Chloride 0.9% 50   





    ml @ 100 mls/hr IVPB Q24H   





    [facility] Rx#:[id number]   


 


    Sodium Chloride 0.9% 1, 600 862.5 





    000 ml @ 75 mls/hr IV .   





    [id number] [facility] Rx#:[id number]   


 


    Sodium Chloride 0.9% 500 500  





    ml 500 ml @ 0 mls/hr IV .   





    [facility] Rx#:   





    [id number]   


 


    Sodium Ferric Gluconat- 100  





    Sucrose 125 mg In Sodium   





    Chloride 0.9% 100 ml @   





    100 mls/hr IVPB DAILY [facility]   





    Rx#:[id number]   


 


  Blood Product   310


 


    Rc As-1  Unit   310





    [id number]   


 


Other:   


 


  Voiding Method Diaper Diaper Diaper


 


  # Voids 0 1 1














- Exam





In general patient is alert, responsive, in no distress


HEENT head normocephalic and atraumatic


Neck is supple no JVD no goiter no lymphadenopathy


Chest exam reveals a few scattered crackles no wheezing


Cardiac exam reveals regular heart sounds no gallops no murmurs


Abdomen is soft nontender no organomegaly was normal bowel sounds


Extremity exam reveals no edema no cyanosis or clubbing


Neurological examination patient is alert responsive answering questions 

appropriately she is moving all 4 extremity spontaneously there is residual 

spasticity and weakness on the left upper extremity in the left lower extremity





- Labs


CBC & Chem 7: 


                                 11/16/20 05:20





                                 11/16/20 05:20


Labs: 


                  Abnormal Lab Results - Last 24 Hours (Table)











  11/15/20 11/16/20 11/16/20 Range/Units





  20:12 05:20 05:20 


 


RBC   2.10 L   (3.80-5.40)  m/uL


 


Hgb   6.5 L*   (11.4-16.0)  gm/dL


 


Hct   21.6 L   (34.0-46.0)  %


 


MCV   102.7 H   (80.0-100.0)  fL


 


MCHC   30.3 L   (31.0-37.0)  g/dL


 


RDW   16.7 H   (11.5-15.5)  %


 


Sodium    146 H  (135-145)  mmol/L


 


Chloride    117 H  ()  mmol/L


 


BUN    28.0 H  (9.0-27.0)  mg/dL


 


Creatinine    1.9 H  (0.6-1.5)  mg/dL


 


Est GFR (CKD-EPI)AfAm    32.9 L  (60.0-200.0)   


 


Est GFR (CKD-EPI)NonAf    28.4 L  (60.0-200.0)   


 


Glucose    117 H  ()  mg/dL


 


POC Glucose (mg/dL)  252 H    (75-99)  mg/dL


 


Calcium    8.3 L  (8.7-10.3)  mg/dL


 


Total Bilirubin    0.1 L  (0.2-1.2)  mg/dL


 


AST    12 L  (13-35)  U/L


 


Total Protein    4.9 L  (6.2-8.2)  g/dL


 


Albumin    2.20 L  (3.80-4.90)  g/dL


 


Albumin/Globulin Ratio    0.81 L  (1.60-3.17)  g/dL


 


Crossmatch     














  11/16/20 11/16/20 11/16/20 Range/Units





  07:12 10:12 11:37 


 


RBC     (3.80-5.40)  m/uL


 


Hgb     (11.4-16.0)  gm/dL


 


Hct     (34.0-46.0)  %


 


MCV     (80.0-100.0)  fL


 


MCHC     (31.0-37.0)  g/dL


 


RDW     (11.5-15.5)  %


 


Sodium     (135-145)  mmol/L


 


Chloride     ()  mmol/L


 


BUN     (9.0-27.0)  mg/dL


 


Creatinine     (0.6-1.5)  mg/dL


 


Est GFR (CKD-EPI)AfAm     (60.0-200.0)   


 


Est GFR (CKD-EPI)NonAf     (60.0-200.0)   


 


Glucose     ()  mg/dL


 


POC Glucose (mg/dL)  128 H   223 H  (75-99)  mg/dL


 


Calcium     (8.7-10.3)  mg/dL


 


Total Bilirubin     (0.2-1.2)  mg/dL


 


AST     (13-35)  U/L


 


Total Protein     (6.2-8.2)  g/dL


 


Albumin     (3.80-4.90)  g/dL


 


Albumin/Globulin Ratio     (1.60-3.17)  g/dL


 


Crossmatch   See Detail   














  11/16/20 Range/Units





  16:47 


 


RBC   (3.80-5.40)  m/uL


 


Hgb   (11.4-16.0)  gm/dL


 


Hct   (34.0-46.0)  %


 


MCV   (80.0-100.0)  fL


 


MCHC   (31.0-37.0)  g/dL


 


RDW   (11.5-15.5)  %


 


Sodium   (135-145)  mmol/L


 


Chloride   ()  mmol/L


 


BUN   (9.0-27.0)  mg/dL


 


Creatinine   (0.6-1.5)  mg/dL


 


Est GFR (CKD-EPI)AfAm   (60.0-200.0)   


 


Est GFR (CKD-EPI)NonAf   (60.0-200.0)   


 


Glucose   ()  mg/dL


 


POC Glucose (mg/dL)  329 H  (75-99)  mg/dL


 


Calcium   (8.7-10.3)  mg/dL


 


Total Bilirubin   (0.2-1.2)  mg/dL


 


AST   (13-35)  U/L


 


Total Protein   (6.2-8.2)  g/dL


 


Albumin   (3.80-4.90)  g/dL


 


Albumin/Globulin Ratio   (1.60-3.17)  g/dL


 


Crossmatch   








                      Microbiology - Last 24 Hours (Table)











 11/13/20 13:20 Anaerobic Culture - Preliminary





 Toe - Left Third 














Assessment and Plan


Plan: 





1.  Mental status changes likely related to recurrent seizure activity due to 

missing antiseizure medications due to vomiting.  Resolved





2.  Previous history of stroke in 2015, no evidence of stroke at this time.





3.  Underlying history of insulin-dependent diabetes mellitus, type I maintained

on insulin, with previous multiple admissions with DKA





4.  Underlying history of gastroparesis, maintained on Reglan





5.  History of psychosis maintained on Seroquel





6.  Underlying history of hypertension





7.  Underlying history of hyperlipidemia





8.  Previous history of coronary artery disease with myocardial infarction in 

the past





9.  Anemia was significant hemoglobin drop since yesterday.  Patient remains on 

IV iron





10.  Febrile.  Urinary analysis ordered.  Dr. Snyder consulted





11.  Diabetic ulcer to right third toe.  Patient remains on daptomycin possible 

concerns resting mellitus.  Dr. Dye has been consulted for possible 

amputation





12.  Hypotension.  Patient did receive fluids has resolved





Neurology and infectious disease services are following


DPOA is in discussion with family about possible hospice


Dr. Dash has been consulted due to toe infection and possible requiring ampu

tation Yes

## 2022-07-19 NOTE — PHYSICAL THERAPY INITIAL EVALUATION ADULT - PERTINENT HX OF CURRENT PROBLEM, REHAB EVAL
Patient is 67 year old M with pmhx of DM, CAD, COPD, Hypothyroidism, GERD presented to the ED for new onset chest pain.

## 2022-07-19 NOTE — DISCHARGE NOTE NURSING/CASE MANAGEMENT/SOCIAL WORK - NSDCPEFALRISK_GEN_ALL_CORE
For information on Fall & Injury Prevention, visit: [email or web address] OR  [email or web address] OR  https://www.cdc.gov/steadi/patient.html

## 2022-07-19 NOTE — DISCHARGE NOTE NURSING/CASE MANAGEMENT/SOCIAL WORK - PATIENT PORTAL LINK FT
You can access the FollowMyHealth Patient Portal offered by [facility] by registering at the following website: [email or web address]. By joining [employer]’s FollowMyHealth portal, you will also be able to view your health information using other applications (apps) compatible with our system.

## 2022-07-19 NOTE — PROVIDER CONTACT NOTE (OTHER) - ASSESSMENT
BRITNEY contacted [facility] and spoke with Juana (p:[phone]) who confirmed pt is able to return. Pantera requesting to speak with medical provider regarding new medications. BRITNEY provided her number to medical provider along with preferred pharmacy for AL. Pt typically travels via wheelchair ambulette. Pt does not have w/c with him. BRITNEY arranged wheelchair ambulette pickup via [facility] portal (Inv#[id number]) with [employer]. BRITNEY confirmed trip with [employer] for 3pm pickup (trip#[id number]). No other SW needs at this time.

## 2022-08-10 PROBLEM — E03.9 HYPOTHYROIDISM, UNSPECIFIED: Chronic | Status: ACTIVE | Noted: 2022-01-01

## 2022-08-10 PROBLEM — K21.9 GASTRO-ESOPHAGEAL REFLUX DISEASE WITHOUT ESOPHAGITIS: Chronic | Status: ACTIVE | Noted: 2022-01-01
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
## 2022-10-11 NOTE — ED ADULT NURSE NOTE - CHIEF COMPLAINT QUOTE
Pt c/o lower back pain. From [facility]. States he fell 3 months ago, since has been having increased lower back pain. Denies any other complaints at this time. PMHx COPD CVA DM CAD w/ stents HTN GERD

## 2022-10-11 NOTE — ED PROVIDER NOTE - CLINICAL SUMMARY MEDICAL DECISION MAKING FREE TEXT BOX
67 yr old male with hx of COPD, CVA, HTN, HL, DM, CAD 3 stents, with likely lung cancer and bony metastasis (being work up right up), presents for mid-lower thoracic back pain which he states at times radiates towards chest. Pain is described as sharp, starting 5 days ago. Pt also reports bilateral upper 67 yr old male with hx of COPD, CVA, HTN, HL, DM, CAD 3 stents, with likely lung cancer and bony metastasis (being work up right up), presents for mid-lower thoracic back pain which he states at times radiates towards chest. Pt has recent cath, and was offered CABG. Because of chest pain, will peruse ACS work up. For this back pain, ddx include cancer (PET scan showed enhancement at posterior aspect of right 10th rib, where is tenderness is at. Also concern for aortic dissection due to back pain and neuro sx. Scan was ordered to r/o. Likely admit

## 2022-10-11 NOTE — ED ADULT NURSE REASSESSMENT NOTE - NS ED NURSE REASSESS COMMENT FT1
Pt received to 10B from [facility], c/o back pain after falling at his nursing home 3 months ago. Pt states that he lost his balance and hit his neck against the frame of his bed. States he had some tingling to shoulders after. Denies hitting head or LOC. Pt is A&Ox3. Arrives with saline lock from [facility].

## 2022-10-11 NOTE — ED PROVIDER NOTE - NS ED ROS FT
CONSTITUTIONAL: No fever or chill  HEENT: Denies changes in vision and hearing. (+) congestion  RESPIRATORY: (+) sob and cough  CV: (+) chest pain  GI: Denies abdominal pain, nausea, vomiting and diarrhea  : Denies dysuria and urinary frequency.  MSK: (+) back pain  SKIN: Denies rash   NEUROLOGICAL: Denies headache and syncope. tingling in BUE

## 2022-10-11 NOTE — ED PROVIDER NOTE - OBJECTIVE STATEMENT
67 yr old male with hx of COPD, CVA, HTN, HL, DM, CAD 3 stents, with likely lung cancer and bony metastasis (being work up right up), presents for mid-lower thoracic back pain which he states at times radiates towards chest. Pain is described as sharp, starting 5 days ago. Pt also noted to have tingling in BUE, started 3 months ago after he fell and hit his neck. Denies fever, chills, loss of bladder or bowel movement, or saddle paresthesia. Also endorse worsening cough and congestion. No N/V/D/C.

## 2022-10-11 NOTE — ED ADULT TRIAGE NOTE - CHIEF COMPLAINT QUOTE
Pt c/o lower back pain. From [facility]. States he fell 3 months ago, since has been having increased lower back pain. Denies any other complaints at this time. PMHx COPD CVA DM CAD w/ stents HTN GERD

## 2022-10-11 NOTE — ED PROVIDER NOTE - RAPID ASSESSMENT
Thomas: briefly 67 yr old male with hx of COPD, CVA, HTN, HL, DM, CAD 3 stents presents for mid-lower thoracic back pain which he states at times radiates towards chest.  Denies any neurological symptoms such as weakness, numbness, visual changes.  Did fall 3 months ago but denies that as source of his back pain    PE: in pain, worse with movement; not hypertensive; pulses equal bilaterally; CTAB/L; s1 s2 no m/r/g abd soft/NT/ND ext: no edema    Imp: likely MSK back pain but with recent hx and report of radiation to chest; will get CTA to r/o AD, labs, trops    HAVE ASSESSED THIS PATIENT AS QUICK LOOK IN AMBULANCE [facility]AY; will be moved into ED and fully examined and worked up by ED team

## 2022-10-11 NOTE — ED PROVIDER NOTE - ATTENDING CONTRIBUTION TO CARE
The patient is a 67y Male who has a past medical and surgery history of  CAD offered CABG at [facility] Gout COPD HypothyroidismGERD PTED with  mid-lower thoracic back pain which he states at times radiates towards chest.  Pain is not associated with  weakness, numbness paresthesia bladder or bowel Pain is, worse with movement No new SOB cough sputum hemoptysis   Vital Signs Last 24 Hrs  T(F): 98.7 HR: 66 BP: 111/68 RR: 16 SpO2: 99% (11 Oct 2022 16:44) (99% - 99%)  PE: as described; my additions and exceptions are noted in the chart    DATA:  EKG: pending at time of evaluation  LAB: Pending at time of evaluation     IMPRESSION/RISK:  Dx= Thoracic back pain in elderly male   Consideration include Studies ordered in [place]; will followup labs studies and fully assess in main ED   Plan  morphine  - Injectable 4 milliGRAM(s) IV Push  labs   back pain   CTA chest ab pelvis   reassesss

## 2022-10-11 NOTE — ED PROVIDER NOTE - PHYSICAL EXAMINATION
GENERAL: Alert. No acute distress.   EYES: EOMI grossly normal. Anicteric.  HENT: Moist mucous membranes.   RESP: No conversation dyspnea, CTAB  CARDIOVASCULAR: RRR, no m/g/r  ABDOMEN: Soft, protuberance, nttp  MSK: ROM grossly normal in all 4 extremities. No deformities. no midline tenderness, TTP between shoulder blade towards the right  SKIN: no rash  NEUROLOGIC: Alert and oriented x3. nonfocal, 5/5 strength in upper and lower extremities, sensation intact  PSYCHIATRIC: Cooperative. Appropriate mood and affect

## 2022-10-12 NOTE — DISCHARGE NOTE PROVIDER - CARE PROVIDER_API CALL
Yazan Simental; MBBS)  Hematology; [facility]; Medical Oncology  [address] [place] [address] [id number]  Phone: [phone]  Fax: [phone]  Established Patient  Follow Up Time: 1 week

## 2022-10-12 NOTE — H&P ADULT - ATTENDING COMMENTS
67M with a history of CAD s/p 3 stents, former smoker with 50-pack year history, CVA with left sided vision loss, COPD, GERD, gout, hypothyroidism, recently diagnosed abnormal spinal and rib lesions presenting with worsening mid-back pain for the past 5 days. states he chronically has back pain but it has recently gotten worse. was lost to follow-up after visit at [facility] in June after he was told he needed a biopsy, appears patient was not sure what the next steps to do were for medical clearance for biopsy. he was recently hospitalized at [facility] a month ago where he was told that he likely has some kind of myeloma but he did not follow up with his oncologist. denies urinary/bowel incontinence, leg weakness, saddle anesthesia. also with intermittent chest tightness on the R and L side, not related to exertion.    on exam appears to be in discomfort due to back pain. no leg weakness appreciated, patient voiding well into urinal without difficulties.     CT chest/abdomen/pelvis with worsening spinal lesions, rib lesions, also protrusion of soft tissue lesion at T5 that may be causing cord compression.    -neurosurgery following, no surgical intervention for now, awaiting MRI of T-L-S spine  -holding steroids in setting of likely new MM, needs biopsy first  -hematology consulted- plan for BMB tomorrow 10/13  -check beta-2 microglobulin, immunofixation, IgG subsets, SPEP, UPEP  -will hold Plavix for now if patient requires any surgical intervention/biopsy  -pain control with oxycodone PRN  -PT eval    d/w HS 2

## 2022-10-12 NOTE — PATIENT PROFILE ADULT - FALL HARM RISK - HARM RISK INTERVENTIONS

## 2022-10-12 NOTE — CONSULT NOTE ADULT - SUBJECTIVE AND OBJECTIVE BOX
67 yr old male with hx of COPD, CVA, HTN, HL, DM, CAD 3 stents, with likely lung cancer and bony metastasis (Currently being worked up), presents for mid-lower thoracic back pain which he states at times radiates towards chest. Pain is described as sharp, starting 5 days ago. Patient has known spine lesions seen on previous PET and CT scans, but has not yet been referred for a spine evaluation. Denies fever, chills, loss of bladder or bowel function, motor or sensory loss.    PAST MEDICAL & SURGICAL HISTORY:  CAD (coronary artery disease)      Gout      Chronic obstructive pulmonary disease (COPD)      Hypothyroidism      GERD (gastroesophageal reflux disease)      No significant past surgical history    Home Medications:  allopurinol 100 mg oral tablet: 1 tab(s) orally once a day (18 Jul 2022 23:45)  Atrovent HFA 17 mcg/inh inhalation aerosol: 2 puff(s) inhaled 2 times a day (18 Jul 2022 23:45)  Breo Ellipta 200 mcg-25 mcg/inh inhalation powder: 1 puff(s) inhaled once a day (18 Jul 2022 23:45)  ipratropium-albuterol 0.5 mg-2.5 mg/3 mL inhalation solution: 3 milliliter(s) inhaled 3 times a day (18 Jul 2022 23:45)  Januvia 100 mg oral tablet: 1 tab(s) orally once a day (18 Jul 2022 23:45)  Lantus 100 units/mL subcutaneous solution: 15 unit(s) subcutaneous once a day (at bedtime) (18 Jul 2022 23:45)  levothyroxine 125 mcg (0.125 mg) oral capsule: 1 cap(s) orally once a day (18 Jul 2022 23:45)  lidocaine 5% patch: 1 application transdermal once a day (18 Jul 2022 23:45)  Metoprolol Succinate ER 25 mg oral tablet, extended release: 0.5 tab(s) orally once a day (18 Jul 2022 23:45)  NovoLOG FlexPen 100 units/mL injectable solution: sliding scale (18 Jul 2022 23:45)  tamsulosin 0.4 mg oral capsule: 1 cap(s) orally once a day (18 Jul 2022 23:45)  traZODone 150 mg oral tablet: 1 tab(s) orally once a day (at bedtime) (18 Jul 2022 23:45)  Ventolin HFA 90 mcg/inh inhalation aerosol: 2 puff(s) inhaled every 6 hours, As Needed (18 Jul 2022 23:45)    WDWN male in NAD  Vital Signs Last 24 Hrs  T(C): 36.2 (12 Oct 2022 02:52), Max: 37.1 (11 Oct 2022 16:44)  T(F): 97.2 (12 Oct 2022 02:52), Max: 98.7 (11 Oct 2022 16:44)  HR: 59 (12 Oct 2022 02:52) (59 - 66)  BP: 110/61 (12 Oct 2022 02:52) (110/61 - 142/64)  BP(mean): --  RR: 18 (12 Oct 2022 02:52) (16 - 18)  SpO2: 98% (12 Oct 2022 02:52) (98% - 99%)    Parameters below as of 12 Oct 2022 02:52  Patient On (Oxygen Delivery Method): room air    AAO X 3  PERRLA, EOMI  Face symmetric  FOWLER strength 5/5  SILT    < from: CT Angio Chest Aorta w/wo IV Cont (10.11.22 @ 20:19) >  IMPRESSION:  No dissection.    Bronchial/bronchiolar wall thickening, centrilobular micronodules and   indistinct groundglass opacities within left upper lobe can represent   bronchiolitis.    Previously FDG avid 1 cm inferior left renal lesion is unchanged in size.    Interval increase of numerous bilateral lytic metastatic bone lesions.   New pathologic fracture of left posterolateral 9th rib. Mild compression   deformity of T8 vertebral body new from prior.    At the level of T5 there is expansion of the posterior vertebral body   lesion into the spinal canal and most likely cord compression. RECOMMEND   further evaluation with thoracic spine MRI.    < end of copied text >

## 2022-10-12 NOTE — H&P ADULT - NSHPPHYSICALEXAM_GEN_ALL_CORE
Vital Signs Last 24 Hrs  T(C): 36.2 (12 Oct 2022 02:52), Max: 37.1 (11 Oct 2022 16:44)  T(F): 97.2 (12 Oct 2022 02:52), Max: 98.7 (11 Oct 2022 16:44)  HR: 59 (12 Oct 2022 02:52) (59 - 66)  BP: 110/61 (12 Oct 2022 02:52) (110/61 - 142/64)  BP(mean): --  RR: 18 (12 Oct 2022 02:52) (16 - 18)  SpO2: 98% (12 Oct 2022 02:52) (98% - 99%)    Parameters below as of 12 Oct 2022 02:52  Patient On (Oxygen Delivery Method): room air T(C): 36.4 (10-12-22 @ 09:20), Max: 37.1 (10-11-22 @ 16:44)  HR: 56 (10-12-22 @ 09:20) (56 - 66)  BP: 103/70 (10-12-22 @ 09:20) (103/70 - 142/64)  RR: 16 (10-12-22 @ 09:20) (16 - 18)  SpO2: 100% (10-12-22 @ 09:20) (98% - 100%)    CONSTITUTIONAL: Well groomed, no apparent distress  EYES: Pupils pinpoint, No conjunctival or scleral injection, non-icteric  ENMT: Oral mucosa with moist membranes. Patient has full dentures on top, partial dentures on lower jaw, no signs of erythema in throat appreciated  NECK: Supple, symmetric and without tracheal deviation   RESP: No respiratory distress, no use of accessory muscles; CTA b/l  CV: RRR, +S1S2, no MRG; Reproducible pain on palpation  GI: Soft, NT, ND, no rebound, no guarding; no palpable masses; no hepatosplenomegaly; no hernia palpated  LYMPH: No cervical LAD or tenderness  MSK: patient reports upper back pain,   Normal ROM without pain, no spinal tenderness, normal muscle strength/tone  SKIN: No rashes or ulcers noted; no subcutaneous nodules or induration palpable  NEURO: CN II-XII intact; normal reflexes in upper and lower extremities, sensation intact in upper and lower extremities b/l to light touch   PSYCH: Appropriate insight/judgment; A+O x 3, mood and affect appropriate, recent/remote memory intact T(C): 36.4 (10-12-22 @ 09:20), Max: 37.1 (10-11-22 @ 16:44)  HR: 56 (10-12-22 @ 09:20) (56 - 66)  BP: 103/70 (10-12-22 @ 09:20) (103/70 - 142/64)  RR: 16 (10-12-22 @ 09:20) (16 - 18)  SpO2: 100% (10-12-22 @ 09:20) (98% - 100%)    CONSTITUTIONAL: Well groomed, no apparent distress  EYES: Pupils pinpoint, No conjunctival or scleral injection, non-icteric  ENMT: Oral mucosa with moist membranes. Patient has full dentures on top, partial dentures on lower jaw, no signs of erythema in throat appreciated  NECK: Supple, symmetric and without tracheal deviation   RESP: No respiratory distress, no use of accessory muscles; CTA b/l  CV: RRR, +S1S2, no MRG; Reproducible pain on palpation  GI: Soft, NT, ND, no rebound, no guarding; no palpable masses; no hepatosplenomegaly; no hernia palpated  LYMPH: No cervical LAD or tenderness  MSK: patient reports upper back pain, tenderness elicited in lower right back on palpation  Normal ROM without pain, no spinal tenderness, normal muscle strength/tone  SKIN: No rashes or ulcers noted; no subcutaneous nodules or induration palpable  PSYCH: Appropriate insight/judgment; A+O x 3, mood and affect appropriate, recent/remote memory intact

## 2022-10-12 NOTE — H&P ADULT - NSHPLABSRESULTS_GEN_ALL_CORE
14.5   12.05 )-----------( 144      ( 11 Oct 2022 18:36 )             43.5     10-11    139  |  101  |  37<H>  ----------------------------<  107<H>  5.1   |  27  |  1.42<H>    Ca    9.3      11 Oct 2022 18:36  Phos  3.9     10-11  Mg     1.60     10-11    TPro  8.0  /  Alb  3.0<L>  /  TBili  0.4  /  DBili  x   /  AST  20  /  ALT  16  /  AlkPhos  57  10-11        LIVER FUNCTIONS - ( 11 Oct 2022 18:36 )  Alb: 3.0 g/dL / Pro: 8.0 g/dL / ALK PHOS: 57 U/L / ALT: 16 U/L / AST: 20 U/L / GGT: x           PT/INR - ( 11 Oct 2022 20:02 )   PT: 12.2 sec;   INR: 1.05 ratio         PTT - ( 11 Oct 2022 20:02 )  PTT:28.4 sec    CTA Chest/Abdomen/Pelvis: 10/11/2022  IMPRESSION:  No dissection.    Bronchial/bronchiolar wall thickening, centrilobular micronodules and   indistinct groundglass opacities within left upper lobe can represent   bronchiolitis.    Previously FDG avid 1 cm inferior left renal lesion is unchanged in size.    Interval increase of numerous bilateral lytic metastatic bone lesions.   New pathologic fracture of left posterolateral 9th rib. Mild compression   deformity of T8 vertebral body new from prior.    At the level of T5 there is expansion of the posterior vertebral body   lesion into the spinal canal and most likely cord compression. RECOMMEND   further evaluation with thoracic spine MRI.    TTE (7/19/22)  Ejection Fraction (Visual Estimate): 65 %  ------------------------------------------------------------------------  OBSERVATIONS:  Mitral Valve: Mitral annular calcification, otherwise  normal mitral valve. Mild mitral regurgitation.  Aortic Root: Normal aortic root. Unable to exclude  ascending aorta dilatation.  Aortic Valve: Calcified trileaflet aortic valve with normal  opening. Aortic leaflet appears to prolapse in limited  views (Image 9).   Possible moderate aortic regurgitation.  Left Atrium: Normal left atrium.  LA volume index = 23  cc/m2.  Left Ventricle: Normal left ventricular systolic function.  No segmental wall motion abnormalities. Normal left  ventricular internal dimensions and wall thicknesses.  Right Heart: Normal right atrium. Normal right ventricular  size and function. Normal tricuspid valve.  No tricuspid  regurgitation. Normal pulmonic valve.  Minimal pulmonic  regurgitation.  Pericardium/PleuraNormal pericardium with no pericardial  effusion.  ------------------------------------------------------------------------  CONCLUSIONS:  1. Calcified trileaflet aortic valve with normal opening.  Aortic leaflet appears to prolapse in limited views (Image  9).   Possible moderate aortic regurgitation.  2. Normal aortic root. Unable to exclude ascending aorta  dilatation.  3. Normal left ventricular internal dimensions and wall  thicknesses.  4. Normal left ventricular systolic function. No segmental  wall motion abnormalities.  5. Normal right ventricular size and function. 14.5   12.05 )-----------( 144      ( 11 Oct 2022 18:36 )             43.5     10-11    139  |  101  |  37<H>  ----------------------------<  107<H>  5.1   |  27  |  1.42<H>    Ca    9.3      11 Oct 2022 18:36  Phos  3.9     10-11  Mg     1.60     10-11    TPro  8.0  /  Alb  3.0<L>  /  TBili  0.4  /  DBili  x   /  AST  20  /  ALT  16  /  AlkPhos  57  10-11        LIVER FUNCTIONS - ( 11 Oct 2022 18:36 )  Alb: 3.0 g/dL / Pro: 8.0 g/dL / ALK PHOS: 57 U/L / ALT: 16 U/L / AST: 20 U/L / GGT: x           PT/INR - ( 11 Oct 2022 20:02 )   PT: 12.2 sec;   INR: 1.05 ratio         PTT - ( 11 Oct 2022 20:02 )  PTT:28.4 sec    EKG personally reviewed: Sinus bradycardia, Q waves in inferior leads II, III, aVF, TWI in V1-V3, RBBB present on prior EKG seen    CXR personally reviewed: Clear lungs    CTA Chest/Abdomen/Pelvis: 10/11/2022  IMPRESSION:  No dissection.    Bronchial/bronchiolar wall thickening, centrilobular micronodules and   indistinct groundglass opacities within left upper lobe can represent   bronchiolitis.    Previously FDG avid 1 cm inferior left renal lesion is unchanged in size.    Interval increase of numerous bilateral lytic metastatic bone lesions.   New pathologic fracture of left posterolateral 9th rib. Mild compression   deformity of T8 vertebral body new from prior.    At the level of T5 there is expansion of the posterior vertebral body   lesion into the spinal canal and most likely cord compression. RECOMMEND   further evaluation with thoracic spine MRI.    TTE (7/19/22)  Ejection Fraction (Visual Estimate): 65 %  ------------------------------------------------------------------------  OBSERVATIONS:  Mitral Valve: Mitral annular calcification, otherwise  normal mitral valve. Mild mitral regurgitation.  Aortic Root: Normal aortic root. Unable to exclude  ascending aorta dilatation.  Aortic Valve: Calcified trileaflet aortic valve with normal  opening. Aortic leaflet appears to prolapse in limited  views (Image 9).   Possible moderate aortic regurgitation.  Left Atrium: Normal left atrium.  LA volume index = 23  cc/m2.  Left Ventricle: Normal left ventricular systolic function.  No segmental wall motion abnormalities. Normal left  ventricular internal dimensions and wall thicknesses.  Right Heart: Normal right atrium. Normal right ventricular  size and function. Normal tricuspid valve.  No tricuspid  regurgitation. Normal pulmonic valve.  Minimal pulmonic  regurgitation.  Pericardium/PleuraNormal pericardium with no pericardial  effusion.  ------------------------------------------------------------------------  CONCLUSIONS:  1. Calcified trileaflet aortic valve with normal opening.  Aortic leaflet appears to prolapse in limited views (Image  9).   Possible moderate aortic regurgitation.  2. Normal aortic root. Unable to exclude ascending aorta  dilatation.  3. Normal left ventricular internal dimensions and wall  thicknesses.  4. Normal left ventricular systolic function. No segmental  wall motion abnormalities.  5. Normal right ventricular size and function.

## 2022-10-12 NOTE — H&P ADULT - ASSESSMENT
67 YOM with known spinal lesions as well as hx of CAD (PCIx3), COPD, GERD, Gout, hypothyroidism with known spinal lesions is here today for 5 days of progressively worsening back pain was found to have progression of former thoracic spinal lesions concerning for progression of disease. 67 YOM with known spinal lesions as well as hx of CAD (PCIx3), COPD is here today for 5 days of progressively worsening back pain was found to have lytic lesions concerning for multiple myeloma.

## 2022-10-12 NOTE — H&P ADULT - NSHPSOCIALHISTORY_GEN_ALL_CORE
Tobacco -   Alcohol -   Drugs - Tobacco - 50 PY; stopped 3 months ago  Alcohol - No alcohol  Drugs - Occasional Marijuana    Lives in assisted living facility

## 2022-10-12 NOTE — DISCHARGE NOTE PROVIDER - HOSPITAL COURSE
HPI: 67 YOM with hx of CABG s/p PCIx3, 50 PY Smoking hx, stroke (25ya, w/ residual left sided facial weakness), COPD, GERD, gout, hypothyroidism here for 5 days of worsening radiating upper back pain. Patient has known lesions within the thoracic spine that was supposed to be biopsied several months ago, but was lost to followup.    Hospital Course  Patient found to have expansion of thoracic lesions with signs concerning for cord compression, but lacked any red flag signs. Patient seen by NSGY team and assessed to have no need for acute intervention. Hematology consulted for high suspicion of MM with elevated kappa/lambda ratio; with BM Bx showing ___________; MRI of T Spine showed __________

## 2022-10-12 NOTE — CONSULT NOTE ADULT - ASSESSMENT
66 YO male with lung Ca, known spinal metastases presenting with back pain, found to have expansion of a L-5 lesion with extension to the spinal canal. Patient does not require urgent surgical intervention

## 2022-10-12 NOTE — CONSULT NOTE ADULT - TIME BILLING
Pt seen and examined. Pt has normal strength and sensation. CT spine reviewed, demonstrates T5 lytic lesion with extension into spinal canal and possible spinal cord compression and mild T8 compression deformity. Awaiting MRI T-spine. Pt being evaluated for multiple myeloma and IR biopsy of rib is pending.

## 2022-10-12 NOTE — DISCHARGE NOTE PROVIDER - NSDCCPCAREPLAN_GEN_ALL_CORE_FT
PRINCIPAL DISCHARGE DIAGNOSIS  Diagnosis: Thoracic back pain  Assessment and Plan of Treatment:       SECONDARY DISCHARGE DIAGNOSES  Diagnosis: Metastasis to spinal column  Assessment and Plan of Treatment:

## 2022-10-12 NOTE — H&P ADULT - HISTORY OF PRESENT ILLNESS
67 YOM with hx of CABG s/p PCIx3, 50 PY smoking hx, stroke (25 years ago), COPD, GERD, gout, hypothyroidism is here today for 5 days of worsening radiating upper back pain. Patient reports 67 YOM with hx of CABG s/p PCIx3, 50 PY smoking hx, stroke (25 years ago), COPD, GERD, gout, hypothyroidism is here today for 5 days of worsening radiating upper back pain. Patient was recently admitted to [facility] in July for chest pain and a syncopal episode and was cleared with no intervention.       In the ED; patient arrived with T 98.7, /68, HR 66, O2 sat 100%; went through CT scan which showed progression of his T Spine lesions as well as suspected pathologic fracture. NSGY was consulted, but ruled no urgent intervention in favor of further MM workup. 67 YOM with hx of CABG s/p PCIx3, 50 PY smoking hx, stroke (25 years ago), COPD, GERD, gout, hypothyroidism is here today for 5 days of worsening radiating upper back pain. Patient was recently admitted to [facility] in July for chest pain and a syncopal episode and was cleared with no intervention. Patient was also planned for IR biopsy of his thoracic lesion with IR, but was unable to go to outpatient appointment and never followed up. He reports 5 days ago, he began to get worsening back pain as well as chest tightness that worsened when he coughed.     In the ED; patient arrived with T 98.7, /68, HR 66, O2 sat 100%; went through CT scan which showed progression of his T Spine lesions as well as suspected pathologic fracture. NSGY was consulted, but ruled no urgent intervention in favor of further MM workup. 67 YOM with hx of CABG s/p PCIx3, 50 PY smoking hx, stroke (25 years ago), COPD, GERD, gout, hypothyroidism is here today for 5 days of worsening radiating upper back pain. Patient was recently admitted to [facility] in July for chest pain and a syncopal episode and was cleared with no intervention. Patient was also planned for IR biopsy of his thoracic lesion with IR, but was unable to go to outpatient appointment and never followed up. He reports 5 days ago, he began to get worsening back pain as well as chest tightness that worsened when he coughed. Reports that his tylenol pain medications were insufficient in managing his pain.    In the ED; patient arrived with T 98.7, /68, HR 66, O2 sat 100%; went through CT scan which showed progression of his T Spine lesions as well as suspected pathologic fracture. NSGY was consulted, but ruled no urgent intervention in favor of further MM workup.

## 2022-10-12 NOTE — H&P ADULT - NSHPREVIEWOFSYSTEMS_GEN_ALL_CORE
REVIEW OF SYSTEMS:  CONSTITUTIONAL: No fevers, generally feels pain, weakness  EYES/ENT: Vision has progressively become more blurry in both eyes   NECK: No pain or stiffness  RESPIRATORY: Patient feels very congested and reports intermittent painful cough  CARDIOVASCULAR: Chest tightness on bilateral chest; worse with coughing  GASTROINTESTINAL: No abdominal or epigastric pain. Able to control bowels, no difficulty with urination, no dark black stools  GENITOURINARY: No dysuria, frequency or hematuria  NEUROLOGICAL: No numbness or weakness  SKIN: No itching, rashes

## 2022-10-12 NOTE — DISCHARGE NOTE PROVIDER - NSDCMRMEDTOKEN_GEN_ALL_CORE_FT
Patient was observed to be sleeping for 7 hours without any distress and even respirations. Q 15 minutes monitoring maintained for the safety and support. allopurinol 100 mg oral tablet: 1 tab(s) orally once a day  aspirin 81 mg oral delayed release tablet: 1 tab(s) orally once a day  atorvastatin 80 mg oral tablet: 1 tab(s) orally once a day  Atrovent HFA 17 mcg/inh inhalation aerosol: 2 puff(s) inhaled 2 times a day  Breo Ellipta 200 mcg-25 mcg/inh inhalation powder: 1 puff(s) inhaled once a day  clopidogrel 75 mg oral tablet: 1 tab(s) orally once a day  ipratropium-albuterol 0.5 mg-2.5 mg/3 mL inhalation solution: 3 milliliter(s) inhaled 3 times a day  Januvia 100 mg oral tablet: 1 tab(s) orally once a day  levothyroxine 125 mcg (0.125 mg) oral capsule: 1 cap(s) orally once a day  Metoprolol Succinate ER 25 mg oral tablet, extended release: 0.5 tab(s) orally once a day  NovoLOG FlexPen 100 units/mL injectable solution: sliding scale  tamsulosin 0.4 mg oral capsule: 1 cap(s) orally once a day  traZODone 150 mg oral tablet: 1 tab(s) orally once a day (at bedtime)  Ventolin HFA 90 mcg/inh inhalation aerosol: 2 puff(s) inhaled every 6 hours, As Needed

## 2022-10-12 NOTE — CONSULT NOTE ADULT - ATTENDING COMMENTS
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 Previous work up as above. Concern for cord compressio, but no motor or sensory deficits currently.   -follow up MRI T/L spine   -appreciate rad onc recs, consider XRT inpatient    -will need bmbx on admission   -continue dexamethasone  -pain control

## 2022-10-12 NOTE — CONSULT NOTE ADULT - SUBJECTIVE AND OBJECTIVE BOX
HPI:  66 y/o M (current smoker) w/ a PMHx of CAD s/p 3 stents (on Plavix), CVA, COPD, Gout, T2DM, HLD, OA, and hypothyroidism who presents with back pain. Hematology consulted given concern for MM.    Patient had been in his usual state of health until 3/2022 when he began to experience R flank pain which was associated with occasional hematuria. Patient presented to [facility] ED on 3/14/22 for these symptoms. A CT Renal Stone Munoz was performed which showed an irregular parenchymal opacity in the RLL of uncertain etiology, diffuse hepatic steatosis, no hydronephrosis or radiopaque renal calculi, indeterminate 1.1 cm hyperdense lesion projecting from the lower pole of the left kidney, and lucent lesions in the spine, left acetabulum, and left iliac bone of uncertain etiology. A CT Chest was then obtained which showed bronchial mucoid impaction, scattered tiny clustered nodules and   branching/tubular opacities likely mucoid impaction, and lucent lesions in the T7 and T8 vertebral bodies which were indeterminate. Since patient's pain was improved, he was ultimately discharged from [facility] ED w/ plan for outpatient follow-up. He was referred to CT Surgery and an MRI T-spine was subsequently obtained on 5/1/22. This showed multiple osseous lesions concerning for metastatic disease versus myeloma, expansion of the right T5 pedicle causing mild to moderate right neural foraminal stenosis at T4-T5 and moderate to severe right neural foraminal stenosis at T5-T6, multilevel degenerative disc disease, and T5-T6 demonstrated a large central disc protrusion causing moderate spinal canal stenosis with indentation of the ventral cord. An MRI Brain was then obtained on 5/16/22 which showed multiple enhancing lesions in the skull base and calvarium, suspicious for malignancy such as metastases or multiple myeloma. There was no abnormal parenchymal enhancement. Patient then underwent a PET/CT on 5/21/22 which showed multiple FDG-avid lytic and difficult to delineate lesions in the axial skeleton which are compatible with malignancy, FDG-avid fracture, posterolateral aspect of right 10th rib, possibly pathologic, FDG-avid hyperdense 1.1 cm left lower pole renal lesion is suspicious for renal neoplasm, indeterminate FDG-avid focus mapping to a loop of colon versus small bowel within the midline of the anterior pelvis, and previously noted branching/tubular opacities in the bilateral lower lobes were improved. Patient was then planned for a  biopsy of the R 10th rib lesion which is still pending.     He underwent an outpatient MM work-up on 6/16/22 which revealed:  B2 MG: 3.7  IgG 2235, IgM 42, IgA 427  FLCr 4.56  M-spike 2.2  VERONICA: IgG Kappa band    It appears he was then lost to follow-up. He presents now with back pain with CTA Chest showing Previously FDG avid 1 cm inferior left renal lesion is unchanged in size. Interval increase of numerous bilateral lytic metastatic bone lesions. New pathologic fracture of left posterolateral 9th rib. Mild compression deformity of T8 vertebral body new from prior. At the level of T5 there is expansion of the posterior vertebral body lesion into the spinal canal and most likely cord compression.    MRI Spine is pending.      PAST MEDICAL & SURGICAL HISTORY:  CAD (coronary artery disease)      Gout      Chronic obstructive pulmonary disease (COPD)      Hypothyroidism      GERD (gastroesophageal reflux disease)      No significant past surgical history          Allergies    No Known Allergies    Intolerances        MEDICATIONS  (STANDING):  traZODone 150 milliGRAM(s) Oral at bedtime    MEDICATIONS  (PRN):  acetaminophen     Tablet .. 650 milliGRAM(s) Oral every 6 hours PRN Temp greater or equal to 38.5C (101.3F), Mild Pain (1 - 3), Moderate Pain (4 - 6)  oxyCODONE    IR 2.5 milliGRAM(s) Oral every 6 hours PRN Severe Pain (7 - 10)      FAMILY HISTORY:  Family hx of lung cancer (Mother)        SOCIAL HISTORY: No EtOH, no tobacco    REVIEW OF SYSTEMS:  12 point review of systems is negative unless indicated above.    Height (cm): 160 (10-11 @ 16:44)    T(F): 97.5 (10-12-22 @ 13:47), Max: 98.7 (10-11-22 @ 16:44)  HR: 69 (10-12-22 @ 13:47)  BP: 115/66 (10-12-22 @ 13:47)  RR: 18 (10-12-22 @ 13:47)  SpO2: 95% (10-12-22 @ 13:47)  Wt(kg): --    GENERAL: NAD, well-developed  HEAD:  Atraumatic, Normocephalic  EYES: EOMI, PERRLA, conjunctiva and sclera clear  NECK: Supple, No JVD  CHEST/LUNG: Clear to auscultation bilaterally; No wheeze  HEART: Regular rate and rhythm; No murmurs, rubs, or gallops  ABDOMEN: Soft, Nontender, Nondistended; Bowel sounds present  EXTREMITIES:  2+ Peripheral Pulses, No clubbing, cyanosis, or edema  NEUROLOGY: non-focal  SKIN: No rashes or lesions                          14.5   10.67 )-----------( 138      ( 12 Oct 2022 08:48 )             45.3       10-12    133<L>  |  96<L>  |  30<H>  ----------------------------<  89  4.5   |  28  |  1.32<H>    Ca    9.4      12 Oct 2022 08:48  Phos  3.9     10-12  Mg     1.70     10-12    TPro  8.1  /  Alb  3.1<L>  /  TBili  0.6  /  DBili  x   /  AST  19  /  ALT  15  /  AlkPhos  61  10-12      Magnesium, Serum: 1.70 mg/dL (10-12 @ 08:48)  Phosphorus Level, Serum: 3.9 mg/dL (10-12 @ 08:48)  Magnesium, Serum: 1.60 mg/dL (10-11 @ 18:36)  Phosphorus Level, Serum: 3.9 mg/dL (10-11 @ 18:36)      PT/INR - ( 11 Oct 2022 20:02 )   PT: 12.2 sec;   INR: 1.05 ratio         PTT - ( 11 Oct 2022 20:02 )  PTT:28.4 sec

## 2022-10-12 NOTE — CONSULT NOTE ADULT - ASSESSMENT
66 yo M with a h/o  CAD s/p 3 stents (on Plavix), CVA, COPD, Gout, T2DM, HLD, OA, and hypothyroidism who was seen outpatient for work-up of solid malignancy vs. MM in June 2022 and presents now with worsening back pain with imaging showing multiple lytic lesions. Hematology consulted for MM rule out.    #Rule-out MM  -Patient initially presented in 3/2022 with R flank pain--> CT Renal Stone Munoz showed an irregular parenchymal opacity in the RLL of uncertain etiology, diffuse hepatic steatosis, no hydronephrosis or radiopaque renal calculi, indeterminate 1.1 cm hyperdense lesion projecting from the lower pole of the left kidney, and lucent lesions in the spine, left acetabulum, and left iliac bone of uncertain etiology.  CT Chest showed bronchial mucoid impaction, scattered tiny clustered nodules and   branching/tubular opacities likely mucoid impaction, and lucent lesions in the T7 and T8 vertebral bodies which were indeterminate.  -MRI T-Spine 5/21/22 showing multiple osseous lesions concerning for metastatic disease versus myeloma, expansion of the right T5 pedicle causing mild to moderate right neural foraminal stenosis at T4-T5 and moderate to severe right neural foraminal stenosis at T5-T6, multilevel degenerative disc disease, and T5-T6 demonstrated a large central disc protrusion causing moderate spinal canal stenosis with indentation of the ventral cord.   -MRI Brain  5/16/22 showed multiple enhancing lesions in the skull base and calvarium, suspicious for malignancy such as metastases or multiple myeloma. There was no abnormal parenchymal enhancement.   Tiffani Wylie MD  Hematology/Oncology Fellow, PGY-6  Pager: [phone]  After 5pm and on weekends please page on-call fellow-PET/CT on 5/21/22 showed multiple FDG-avid lytic and difficult to delineate lesions in the axial skeleton which are compatible with malignancy, FDG-avid fracture, posterolateral aspect of right 10th rib, possibly pathologic, FDG-avid hyperdense 1.1 cm left lower pole renal lesion is suspicious for renal neoplasm, indeterminate FDG-avid focus mapping to a loop of colon versus small bowel within the midline of the anterior pelvis, and previously noted branching/tubular opacities in the bilateral lower lobes were improved.   -Had been planned for 10th rib biopsy which was not performed  -Seen outpatient by Dr. Simental on 6/16/22 for malignancy work-up. MM labs obtained at that time showing:  ---B2 MG: 3.7  ---IgG 2235, IgM 42, IgA 427  ---FLCr 4.56  ---M-spike 2.2  ---VERONICA: IgG Kappa band  -Patient had a missed appointment on 4/16/22 for follow-up of MM labs at [facility]. [facility] did try to arrange for transportation and follow-up but was not able to reach patient  -CTA Chest/Abdomen 10/11/22 showing  previously FDG avid 1 cm inferior left renal lesion is unchanged in size. Interval increase of numerous bilateral lytic metastatic bone lesions. New pathologic fracture of left posterolateral 9th rib. Mild compression deformity of T8 vertebral body new from prior. At the level of T5 there is expansion of the posterior vertebral body lesion into the spinal canal and most likely cord compression.  -Above lab work and imaging is concerning for multiple myeloma. Please recheck SPEP/VERONICA, UPEP/VERONICA, Free light Chains, Immunoglobulin panel  -MRI C/T/L spine pending  -We will plan for a BMBx to confirm MM on 10/13/22      Tiffani Wylie MD  Hematology/Oncology Fellow, PGY-6  Pager: [phone]  After 5pm and on weekends please page on-call fellow 66 yo M with a h/o  CAD s/p 3 stents (on Plavix), CVA, COPD, Gout, T2DM, HLD, OA, and hypothyroidism who was seen outpatient for work-up of solid malignancy vs. MM in June 2022 and presents now with worsening back pain with imaging showing multiple lytic lesions. Hematology consulted for MM rule out.    #Rule-out MM  -Patient initially presented in 3/2022 with R flank pain--> CT Renal Stone Munoz showed an irregular parenchymal opacity in the RLL of uncertain etiology, diffuse hepatic steatosis, no hydronephrosis or radiopaque renal calculi, indeterminate 1.1 cm hyperdense lesion projecting from the lower pole of the left kidney, and lucent lesions in the spine, left acetabulum, and left iliac bone of uncertain etiology.  CT Chest showed bronchial mucoid impaction, scattered tiny clustered nodules and   branching/tubular opacities likely mucoid impaction, and lucent lesions in the T7 and T8 vertebral bodies which were indeterminate.  -MRI T-Spine 5/21/22 showing multiple osseous lesions concerning for metastatic disease versus myeloma, expansion of the right T5 pedicle causing mild to moderate right neural foraminal stenosis at T4-T5 and moderate to severe right neural foraminal stenosis at T5-T6, multilevel degenerative disc disease, and T5-T6 demonstrated a large central disc protrusion causing moderate spinal canal stenosis with indentation of the ventral cord.   -MRI Brain  5/16/22 showed multiple enhancing lesions in the skull base and calvarium, suspicious for malignancy such as metastases or multiple myeloma. There was no abnormal parenchymal enhancement.   -PET/CT on 5/21/22 showed multiple FDG-avid lytic and difficult to delineate lesions in the axial skeleton which are compatible with malignancy, FDG-avid fracture, posterolateral aspect of right 10th rib, possibly pathologic, FDG-avid hyperdense 1.1 cm left lower pole renal lesion is suspicious for renal neoplasm, indeterminate FDG-avid focus mapping to a loop of colon versus small bowel within the midline of the anterior pelvis, and previously noted branching/tubular opacities in the bilateral lower lobes were improved.   -Had been planned for 10th rib biopsy which was not performed  -Seen outpatient by Dr. Simental on 6/16/22 for malignancy work-up. MM labs obtained at that time showing:  ---B2 MG: 3.7  ---IgG 2235, IgM 42, IgA 427  ---FLCr 4.56  ---M-spike 2.2  ---VERONICA: IgG Kappa band  -Patient had a missed appointment on 4/16/22 for follow-up of MM labs at [facility]. [facility] did try to arrange for transportation and follow-up but was not able to reach patient  -CTA Chest/Abdomen 10/11/22 showing  previously FDG avid 1 cm inferior left renal lesion is unchanged in size. Interval increase of numerous bilateral lytic metastatic bone lesions. New pathologic fracture of left posterolateral 9th rib. Mild compression deformity of T8 vertebral body new from prior. At the level of T5 there is expansion of the posterior vertebral body lesion into the spinal canal and most likely cord compression.  -Above lab work and imaging is concerning for multiple myeloma. Please recheck SPEP/VERONICA, UPEP/VERONICA, Free light Chains, Immunoglobulin panel  -MRI C/T/L spine pending  -We will plan for a BMBx to confirm MM on 10/13/22      Tiffani Wylie MD  Hematology/Oncology Fellow, PGY-6  Pager: [phone]  After 5pm and on weekends please page on-call zaki 66 yo M with a h/o  CAD s/p 3 stents (on Plavix), CVA, COPD, Gout, T2DM, HLD, OA, and hypothyroidism who was seen outpatient for work-up of solid malignancy vs. MM in June 2022 and presents now with worsening back pain with imaging showing multiple lytic lesions. Hematology consulted for MM rule out.    #Rule-out MM  -Patient initially presented in 3/2022 with R flank pain--> CT Renal Stone Munoz showed an irregular parenchymal opacity in the RLL of uncertain etiology, diffuse hepatic steatosis, no hydronephrosis or radiopaque renal calculi, indeterminate 1.1 cm hyperdense lesion projecting from the lower pole of the left kidney, and lucent lesions in the spine, left acetabulum, and left iliac bone of uncertain etiology.  CT Chest showed bronchial mucoid impaction, scattered tiny clustered nodules and   branching/tubular opacities likely mucoid impaction, and lucent lesions in the T7 and T8 vertebral bodies which were indeterminate.  -MRI T-Spine 5/21/22 showing multiple osseous lesions concerning for metastatic disease versus myeloma, expansion of the right T5 pedicle causing mild to moderate right neural foraminal stenosis at T4-T5 and moderate to severe right neural foraminal stenosis at T5-T6, multilevel degenerative disc disease, and T5-T6 demonstrated a large central disc protrusion causing moderate spinal canal stenosis with indentation of the ventral cord.   -MRI Brain  5/16/22 showed multiple enhancing lesions in the skull base and calvarium, suspicious for malignancy such as metastases or multiple myeloma. There was no abnormal parenchymal enhancement.   -PET/CT on 5/21/22 showed multiple FDG-avid lytic and difficult to delineate lesions in the axial skeleton which are compatible with malignancy, FDG-avid fracture, posterolateral aspect of right 10th rib, possibly pathologic, FDG-avid hyperdense 1.1 cm left lower pole renal lesion is suspicious for renal neoplasm, indeterminate FDG-avid focus mapping to a loop of colon versus small bowel within the midline of the anterior pelvis, and previously noted branching/tubular opacities in the bilateral lower lobes were improved.   -Had been planned for 10th rib biopsy which was not performed  -Seen outpatient by Dr. Simental on 6/16/22 for malignancy work-up. MM labs obtained at that time showing:  ---B2 MG: 3.7  ---IgG 2235, IgM 42, IgA 427  ---FLCr 4.56  ---M-spike 2.2  ---VERONICA: IgG Kappa band  -Patient had a missed appointment on 4/16/22 for follow-up of MM labs at [facility]. [facility] did try to arrange for transportation and follow-up but was not able to reach patient  -CTA Chest/Abdomen 10/11/22 showing  previously FDG avid 1 cm inferior left renal lesion is unchanged in size. Interval increase of numerous bilateral lytic metastatic bone lesions. New pathologic fracture of left posterolateral 9th rib. Mild compression deformity of T8 vertebral body new from prior. At the level of T5 there is expansion of the posterior vertebral body lesion into the spinal canal and most likely cord compression.  -Above lab work and imaging is concerning for multiple myeloma. Please recheck SPEP/VERONICA, UPEP/VERONICA, Free light Chains, Immunoglobulin panel  -MRI C/T/L spine pending  -We will plan for a BMBx to confirm MM on 10/13/22      Tiffani Wylie MD  Hematology/Oncology Fellow, PGY-6  Pager: [phone]  After 5pm and on weekends please page on-call zaki 68 yo M with a h/o  CAD s/p 3 stents (on Plavix), CVA, COPD, Gout, T2DM, HLD, OA, and hypothyroidism who was seen outpatient for work-up of solid malignancy vs. MM in June 2022 and presents now with worsening back pain with imaging showing multiple lytic lesions. Hematology consulted for MM rule out.    #Rule-out MM  -Patient initially presented in 3/2022 with R flank pain--> CT Renal Stone Munoz showed an irregular parenchymal opacity in the RLL of uncertain etiology, diffuse hepatic steatosis, no hydronephrosis or radiopaque renal calculi, indeterminate 1.1 cm hyperdense lesion projecting from the lower pole of the left kidney, and lucent lesions in the spine, left acetabulum, and left iliac bone of uncertain etiology.  CT Chest showed bronchial mucoid impaction, scattered tiny clustered nodules and   branching/tubular opacities likely mucoid impaction, and lucent lesions in the T7 and T8 vertebral bodies which were indeterminate.  -MRI T-Spine 5/21/22 showing multiple osseous lesions concerning for metastatic disease versus myeloma, expansion of the right T5 pedicle causing mild to moderate right neural foraminal stenosis at T4-T5 and moderate to severe right neural foraminal stenosis at T5-T6, multilevel degenerative disc disease, and T5-T6 demonstrated a large central disc protrusion causing moderate spinal canal stenosis with indentation of the ventral cord.   -MRI Brain  5/16/22 showed multiple enhancing lesions in the skull base and calvarium, suspicious for malignancy such as metastases or multiple myeloma. There was no abnormal parenchymal enhancement.   -PET/CT on 5/21/22 showed multiple FDG-avid lytic and difficult to delineate lesions in the axial skeleton which are compatible with malignancy, FDG-avid fracture, posterolateral aspect of right 10th rib, possibly pathologic, FDG-avid hyperdense 1.1 cm left lower pole renal lesion is suspicious for renal neoplasm, indeterminate FDG-avid focus mapping to a loop of colon versus small bowel within the midline of the anterior pelvis, and previously noted branching/tubular opacities in the bilateral lower lobes were improved.   -Had been planned for 10th rib biopsy which was not performed  -Seen outpatient by Dr. Simental on 6/16/22 for malignancy work-up. MM labs obtained at that time showing:  ---B2 MG: 3.7  ---IgG 2235, IgM 42, IgA 427  ---FLCr 4.56  ---M-spike 2.2  ---VERONICA: IgG Kappa band  -Patient had a missed appointment on 4/16/22 for follow-up of MM labs at [facility]. [facility] did try to arrange for transportation and follow-up but was not able to reach patient  -CTA Chest/Abdomen 10/11/22 showing  previously FDG avid 1 cm inferior left renal lesion is unchanged in size. Interval increase of numerous bilateral lytic metastatic bone lesions. New pathologic fracture of left posterolateral 9th rib. Mild compression deformity of T8 vertebral body new from prior. At the level of T5 there is expansion of the posterior vertebral body lesion into the spinal canal and most likely cord compression.  -Above lab work and imaging is concerning for multiple myeloma. Please recheck SPEP/VERONICA, UPEP/VERONICA, Free light Chains, Immunoglobulin panel  -MRI C/T/L spine pending. Consider Rad Onc consult pending results of MRI  -We will plan for a BMBx to confirm MM on 10/13/22      Tiffani Wylie MD  Hematology/Oncology Fellow, PGY-6  Pager: [phone]  After 5pm and on weekends please page on-call zaki 68 yo M with a h/o  CAD s/p 3 stents (on Plavix), CVA, COPD, Gout, T2DM, HLD, OA, and hypothyroidism who was seen outpatient for work-up of solid malignancy vs. MM in June 2022 and presents now with worsening back pain with imaging showing multiple lytic lesions. Hematology consulted for MM rule out.    #Rule-out MM  -Patient initially presented in 3/2022 with R flank pain--> CT Renal Stone Munoz showed an irregular parenchymal opacity in the RLL of uncertain etiology, diffuse hepatic steatosis, no hydronephrosis or radiopaque renal calculi, indeterminate 1.1 cm hyperdense lesion projecting from the lower pole of the left kidney, and lucent lesions in the spine, left acetabulum, and left iliac bone of uncertain etiology.  CT Chest showed bronchial mucoid impaction, scattered tiny clustered nodules and   branching/tubular opacities likely mucoid impaction, and lucent lesions in the T7 and T8 vertebral bodies which were indeterminate.  -MRI T-Spine 5/21/22 showing multiple osseous lesions concerning for metastatic disease versus myeloma, expansion of the right T5 pedicle causing mild to moderate right neural foraminal stenosis at T4-T5 and moderate to severe right neural foraminal stenosis at T5-T6, multilevel degenerative disc disease, and T5-T6 demonstrated a large central disc protrusion causing moderate spinal canal stenosis with indentation of the ventral cord.   -MRI Brain  5/16/22 showed multiple enhancing lesions in the skull base and calvarium, suspicious for malignancy such as metastases or multiple myeloma. There was no abnormal parenchymal enhancement.   -PET/CT on 5/21/22 showed multiple FDG-avid lytic and difficult to delineate lesions in the axial skeleton which are compatible with malignancy, FDG-avid fracture, posterolateral aspect of right 10th rib, possibly pathologic, FDG-avid hyperdense 1.1 cm left lower pole renal lesion is suspicious for renal neoplasm, indeterminate FDG-avid focus mapping to a loop of colon versus small bowel within the midline of the anterior pelvis, and previously noted branching/tubular opacities in the bilateral lower lobes were improved.   -Had been planned for 10th rib biopsy which was not performed  -Seen outpatient by Dr. Simental on 6/16/22 for malignancy work-up. MM labs obtained at that time showing:  ---B2 MG: 3.7  ---IgG 2235, IgM 42, IgA 427  ---FLCr 4.56  ---M-spike 2.2  ---VERONICA: IgG Kappa band  -Patient had a missed appointment on 4/16/22 for follow-up of MM labs at [facility]. [facility] did try to arrange for transportation and follow-up but was not able to reach patient  -CTA Chest/Abdomen 10/11/22 showing  previously FDG avid 1 cm inferior left renal lesion is unchanged in size. Interval increase of numerous bilateral lytic metastatic bone lesions. New pathologic fracture of left posterolateral 9th rib. Mild compression deformity of T8 vertebral body new from prior. At the level of T5 there is expansion of the posterior vertebral body lesion into the spinal canal and most likely cord compression.  -Above lab work and imaging is concerning for multiple myeloma. Please recheck SPEP/VERONICA, UPEP/VERONICA, Free light Chains, Immunoglobulin panel  -MRI C/T/L spine pending. Consider Rad Onc consult pending results of MRI  -We will plan for a BMBx to confirm MM on 10/13/22      Tiffani Wylie MD  Hematology/Oncology Fellow, PGY-6  Pager: [phone]  After 5pm and on weekends please page on-call zaki

## 2022-10-12 NOTE — H&P ADULT - PROBLEM SELECTOR PLAN 1
#Lytic lesions  #Concern for multiple myeloma #Lytic lesions  #Concern for multiple myeloma    Appreciate H/O Recs #Lytic lesions on the bones  IR Biopsy if MM Workup is negative    #Concern for multiple myeloma (LARISA, Lytic bone lesions, mildly elevated calcium)  Appreciate H/O Recs  Immunofixation  B2 Microglobulin  SPEP  UPEP/VERONICA  Free Light Chains  Immunoglobulin panel    BM Biopsy Tomorrow

## 2022-10-12 NOTE — CHART NOTE - NSCHARTNOTEFT_GEN_A_CORE
Imaging reviewed, mild T8 comp fx and T5 Rt sided epidural ext of bony vb/pedicle lesion. Had labs in june as outpt w/ Dr. Simental c/f multiple myeloma, and was planned for 10th rib biopsy with IR but seems that it didnt happen. He's asymptomatic now so will hold off on steroids, but needs IR biopsy inpatient and resending of his MM labs. He does have elevated Cr 1.43 and calcium 9.3.     Please resend, SPEP/UPEP/bence coppola protein, urine prot/cr ratio, IgG subsets and light chains, and fu with hematology/oncology. Would get IR consult for biopsy and hold off on steroids for now. Continue q4h neuro checks and notify neurosurgery if any change in exam.     Available on [employer] teams  will d/w attending.

## 2022-10-13 NOTE — PROGRESS NOTE ADULT - PROBLEM SELECTOR PLAN 7
DVT PPX: Heparin SQ  Diet: Regular Diet #Rule out ACS  EKG Shows no signs of new infarcts  Trops/CKMB Trending down

## 2022-10-13 NOTE — PROGRESS NOTE ADULT - ASSESSMENT
67 YOM with known spinal lesions as well as hx of CAD (PCIx3), COPD is here today for 5 days of progressively worsening back pain was found to have lytic lesions concerning for multiple myeloma. 67 YOM with known spinal lesions as well as hx of CAD (PCIx3), COPD is here today for 5 days of progressively worsening back pain was found to have lytic lesions concerning for multiple myeloma underwent bone marrow bx today.

## 2022-10-13 NOTE — CHART NOTE - NSCHARTNOTEFT_GEN_A_CORE
Patient seen and examined at bedside this AM, no hyperreflexia, BLE moving strong.     Agree with heme/onc mgmt of likely multiple myeloma, will fu bone marrow biopsy. If labs and BM are confirmatory, would involve Radiation Oncology as soon as possible. No role for acute neurosurgical intervention at this time. After BM Bx, ok to start decadron, would start at 4q6 to help with radiographic cord compression and thereafter have oncology manage steroids along with their treatment plan.     Continue q4h neuro checks.   [id number]

## 2022-10-13 NOTE — PROGRESS NOTE ADULT - PROBLEM SELECTOR PLAN 1
#Lytic lesions on the bones  IR Biopsy if MM Workup is negative    #Concern for multiple myeloma (LARISA, Lytic bone lesions, mildly elevated calcium)  Appreciate H/O Recs  Immunofixation  B2 Microglobulin  SPEP  UPEP/VERONICA  Free Light Chains  Immunoglobulin panel    BM Biopsy Tomorrow #Lytic lesions on the bones  IR Biopsy if MM Workup is negative  Decadron 4q6 after BM Bx; would likely need Bactrim PPX    #Concern for multiple myeloma (LARISA, Lytic bone lesions, mildly elevated calcium)  Appreciate H/O Recs BM Bx today: Labs: Immunofixation, B2 Microglobulin, SPEP, UPEP/VERONICA, Free Light Chains, Immunoglobulin panel    BM Biopsy Tomorrow #Lytic lesions on the bones  BM Bx; IR consult if BM is negative  Decadron 4q6 after BM Bx; would likely need Bactrim PPX  SPEP; kappa/lambda positive    #Concern for multiple myeloma (LARISA, Lytic bone lesions, mildly elevated calcium)  Appreciate H/O Recs BM Bx today: Labs: Immunofixation, B2 Microglobulin, SPEP, UPEP/VERONICA, Free Light Chains, Immunoglobulin panel    BM Biopsy Tomorrow

## 2022-10-13 NOTE — PROGRESS NOTE ADULT - SUBJECTIVE AND OBJECTIVE BOX
INCOMPLETE NOTE    Jesus Vázquez | PGY1| Pager: [phone]  Interval Events:    REVIEW OF SYSTEMS:  CONSTITUTIONAL: No weakness, fevers or chills  EYES/ENT: No visual changes;  No vertigo or throat pain   NECK: No pain or stiffness  RESPIRATORY: No cough, wheezing, hemoptysis; No shortness of breath  CARDIOVASCULAR: No chest pain or palpitations  GASTROINTESTINAL: No abdominal or epigastric pain. No nausea, vomiting, or hematemesis; No diarrhea or constipation. No melena or hematochezia.  GENITOURINARY: No dysuria, frequency or hematuria  NEUROLOGICAL: No numbness or weakness  SKIN: No itching, burning, rashes, or lesions   All other review of systems is negative unless indicated above.    OBJECTIVE:  ICU Vital Signs Last 24 Hrs  T(C): 36.6 (13 Oct 2022 05:49), Max: 36.8 (12 Oct 2022 14:15)  T(F): 97.9 (13 Oct 2022 05:49), Max: 98.3 (12 Oct 2022 14:15)  HR: 63 (13 Oct 2022 05:49) (56 - 75)  BP: 105/55 (13 Oct 2022 05:49) (100/63 - 115/66)  BP(mean): --  ABP: --  ABP(mean): --  RR: 17 (13 Oct 2022 05:49) (16 - 18)  SpO2: 96% (13 Oct 2022 05:49) (95% - 100%)    O2 Parameters below as of 13 Oct 2022 05:49  Patient On (Oxygen Delivery Method): room air              10-12 @ 07:01  -  10-13 @ 05:54  --------------------------------------------------------  IN: 0 mL / OUT: 550 mL / NET: -550 mL      CAPILLARY BLOOD GLUCOSE      POCT Blood Glucose.: 178 mg/dL (12 Oct 2022 21:40)      PHYSICAL EXAM:  General: WN/WD NAD  Neurology: A&Ox3, nonfocal, FOWLER x 4  Eyes: PERRLA/ EOMI, Gross vision intact  ENT/Neck: Neck supple, trachea midline, No JVD, Gross hearing intact  Respiratory: CTA B/L, No wheezing, rales, rhonchi  CV: RRR, +S1/S2, -S3/S4, no murmurs, rubs or gallops  Abdominal: Soft, NT, ND +BS, No HSM  MSK: 5/5 strength UE/LE bilaterally  Extremities: No edema, 2+ peripheral pulses  Skin: No Rashes, Hematoma, Ecchymosis  Incisions:   Tubes:    HOSPITAL MEDICATIONS:  MEDICATIONS  (STANDING):  albuterol/ipratropium for Nebulization 3 milliLiter(s) Nebulizer every 8 hours  allopurinol 100 milliGRAM(s) Oral daily  aspirin  chewable 81 milliGRAM(s) Oral daily  atorvastatin 80 milliGRAM(s) Oral at bedtime  budesonide 160 MICROgram(s)/formoterol 4.5 MICROgram(s) Inhaler 2 Puff(s) Inhalation two times a day  buPROPion XL (24-Hour) . 150 milliGRAM(s) Oral daily  dextrose 5%. 1000 milliLiter(s) (100 mL/Hr) IV Continuous <Continuous>  dextrose 5%. 1000 milliLiter(s) (50 mL/Hr) IV Continuous <Continuous>  dextrose 50% Injectable 25 Gram(s) IV Push once  dextrose 50% Injectable 12.5 Gram(s) IV Push once  dextrose 50% Injectable 25 Gram(s) IV Push once  fluticasone propionate 50 MICROgram(s)/spray Nasal Spray 1 Spray(s) Both Nostrils two times a day  glucagon  Injectable 1 milliGRAM(s) IntraMuscular once  heparin   Injectable 5000 Unit(s) IV Push once  influenza  Vaccine (HIGH DOSE) 0.7 milliLiter(s) IntraMuscular once  insulin lispro (ADMELOG) corrective regimen sliding scale   SubCutaneous three times a day before meals  insulin lispro (ADMELOG) corrective regimen sliding scale   SubCutaneous at bedtime  levothyroxine 125 MICROGram(s) Oral daily  lidocaine 2% (Preservative-free) Injectable 20 milliLiter(s) Local Injection once  nicotine - 21 mG/24Hr(s) Patch 1 Patch Transdermal daily  tamsulosin 0.4 milliGRAM(s) Oral two times a day  traZODone 150 milliGRAM(s) Oral at bedtime    MEDICATIONS  (PRN):  acetaminophen     Tablet .. 650 milliGRAM(s) Oral every 6 hours PRN Temp greater or equal to 38.5C (101.3F), Mild Pain (1 - 3), Moderate Pain (4 - 6)  ALBUTerol    90 MICROgram(s) HFA Inhaler 2 Puff(s) Inhalation every 6 hours PRN Shortness of Breath and/or Wheezing  dextrose Oral Gel 15 Gram(s) Oral once PRN Blood Glucose LESS THAN 70 milliGRAM(s)/deciliter  guaiFENesin Oral Liquid (Sugar-Free) 100 milliGRAM(s) Oral every 6 hours PRN Cough  oxyCODONE    IR 2.5 milliGRAM(s) Oral every 6 hours PRN Moderate Pain (4 - 6)  oxyCODONE    IR 5 milliGRAM(s) Oral every 6 hours PRN Severe Pain (7 - 10)      LABS:                        14.5   10.67 )-----------( 138      ( 12 Oct 2022 08:48 )             45.3     Hgb Trend: 14.5<--, 14.5<--  10-12    133<L>  |  96<L>  |  30<H>  ----------------------------<  89  4.5   |  28  |  1.32<H>    Ca    9.4      12 Oct 2022 08:48  Phos  3.9     10-12  Mg     1.70     10-12    TPro  8.1  /  Alb  3.1<L>  /  TBili  0.6  /  DBili  x   /  AST  19  /  ALT  15  /  AlkPhos  61  10-12    Creatinine Trend: 1.32<--, 1.42<--  PT/INR - ( 11 Oct 2022 20:02 )   PT: 12.2 sec;   INR: 1.05 ratio         PTT - ( 11 Oct 2022 20:02 )  PTT:28.4 sec          MICROBIOLOGY:      Jesus Vázquez | PGY1| Pager: [phone]  Interval Events: NAEON; patient reports oxycodone medication does little for pain, only sedates him, increased to dilaudid PO;     REVIEW OF SYSTEMS:  CONSTITUTIONAL: No weakness, fevers or chills  EYES/ENT: No visual changes;  No vertigo or throat pain   NECK: No pain or stiffness  RESPIRATORY: continued intermittent coughing  CARDIOVASCULAR: Pleuritic chest pain with cough  GASTROINTESTINAL: No abdominal or epigastric pain. No nausea, vomiting, or hematemesis;  GENITOURINARY: Patient reports difficulty urinating at baseline, but offers no acute changes  NEUROLOGICAL: Denied any saddle anesthesia  SKIN: No itching, burning, rashes, or lesions   All other review of systems is negative unless indicated above.    OBJECTIVE:  ICU Vital Signs Last 24 Hrs  T(C): 36.6 (13 Oct 2022 05:49), Max: 36.8 (12 Oct 2022 14:15)  T(F): 97.9 (13 Oct 2022 05:49), Max: 98.3 (12 Oct 2022 14:15)  HR: 63 (13 Oct 2022 05:49) (56 - 75)  BP: 105/55 (13 Oct 2022 05:49) (100/63 - 115/66)  BP(mean): --  ABP: --  ABP(mean): --  RR: 17 (13 Oct 2022 05:49) (16 - 18)  SpO2: 96% (13 Oct 2022 05:49) (95% - 100%)    O2 Parameters below as of 13 Oct 2022 05:49  Patient On (Oxygen Delivery Method): room air              10-12 @ 07:01  -  10-13 @ 05:54  --------------------------------------------------------  IN: 0 mL / OUT: 550 mL / NET: -550 mL      CAPILLARY BLOOD GLUCOSE      POCT Blood Glucose.: 178 mg/dL (12 Oct 2022 21:40)      PHYSICAL EXAM:  General: NAD; however patient reports pain  Neurology: A&Ox3, nonfocal, FOWLER x 4  Eyes: Pupils pinpoint  ENT/Neck: Neck supple, trachea midline,   Respiratory: CTA B/L, No wheezing, rales, rhonchi  CV: RRR, +S1/S2, -S3/S4, no murmurs, rubs or gallops  Abdominal: Soft, NT, ND +BS, No HSM  Extremities: No edema, 2+ peripheral pulses  Skin: No Rashes, Hematoma, Ecchymosis      HOSPITAL MEDICATIONS:  MEDICATIONS  (STANDING):  albuterol/ipratropium for Nebulization 3 milliLiter(s) Nebulizer every 8 hours  allopurinol 100 milliGRAM(s) Oral daily  aspirin  chewable 81 milliGRAM(s) Oral daily  atorvastatin 80 milliGRAM(s) Oral at bedtime  budesonide 160 MICROgram(s)/formoterol 4.5 MICROgram(s) Inhaler 2 Puff(s) Inhalation two times a day  buPROPion XL (24-Hour) . 150 milliGRAM(s) Oral daily  dextrose 5%. 1000 milliLiter(s) (100 mL/Hr) IV Continuous <Continuous>  dextrose 5%. 1000 milliLiter(s) (50 mL/Hr) IV Continuous <Continuous>  dextrose 50% Injectable 25 Gram(s) IV Push once  dextrose 50% Injectable 12.5 Gram(s) IV Push once  dextrose 50% Injectable 25 Gram(s) IV Push once  fluticasone propionate 50 MICROgram(s)/spray Nasal Spray 1 Spray(s) Both Nostrils two times a day  glucagon  Injectable 1 milliGRAM(s) IntraMuscular once  heparin   Injectable 5000 Unit(s) IV Push once  influenza  Vaccine (HIGH DOSE) 0.7 milliLiter(s) IntraMuscular once  insulin lispro (ADMELOG) corrective regimen sliding scale   SubCutaneous three times a day before meals  insulin lispro (ADMELOG) corrective regimen sliding scale   SubCutaneous at bedtime  levothyroxine 125 MICROGram(s) Oral daily  lidocaine 2% (Preservative-free) Injectable 20 milliLiter(s) Local Injection once  nicotine - 21 mG/24Hr(s) Patch 1 Patch Transdermal daily  tamsulosin 0.4 milliGRAM(s) Oral two times a day  traZODone 150 milliGRAM(s) Oral at bedtime    MEDICATIONS  (PRN):  acetaminophen     Tablet .. 650 milliGRAM(s) Oral every 6 hours PRN Temp greater or equal to 38.5C (101.3F), Mild Pain (1 - 3), Moderate Pain (4 - 6)  ALBUTerol    90 MICROgram(s) HFA Inhaler 2 Puff(s) Inhalation every 6 hours PRN Shortness of Breath and/or Wheezing  dextrose Oral Gel 15 Gram(s) Oral once PRN Blood Glucose LESS THAN 70 milliGRAM(s)/deciliter  guaiFENesin Oral Liquid (Sugar-Free) 100 milliGRAM(s) Oral every 6 hours PRN Cough  oxyCODONE    IR 2.5 milliGRAM(s) Oral every 6 hours PRN Moderate Pain (4 - 6)  oxyCODONE    IR 5 milliGRAM(s) Oral every 6 hours PRN Severe Pain (7 - 10)      LABS:                        14.5   10.67 )-----------( 138      ( 12 Oct 2022 08:48 )             45.3     Hgb Trend: 14.5<--, 14.5<--  10-12    133<L>  |  96<L>  |  30<H>  ----------------------------<  89  4.5   |  28  |  1.32<H>    Ca    9.4      12 Oct 2022 08:48  Phos  3.9     10-12  Mg     1.70     10-12    TPro  8.1  /  Alb  3.1<L>  /  TBili  0.6  /  DBili  x   /  AST  19  /  ALT  15  /  AlkPhos  61  10-12    Creatinine Trend: 1.32<--, 1.42<--  PT/INR - ( 11 Oct 2022 20:02 )   PT: 12.2 sec;   INR: 1.05 ratio         PTT - ( 11 Oct 2022 20:02 )  PTT:28.4 sec          MICROBIOLOGY:

## 2022-10-14 NOTE — PHYSICAL THERAPY INITIAL EVALUATION ADULT - ADDITIONAL COMMENTS
Pt reports that he lives at an assisted living facility. Prior to hospital admission pt was ambulating independently using no assistive device. Pt had 2 falls ~3 months ago where he fx his right wrist. He was getting home PT 2x/week (only just started, had 2 visits so far).     Pt left comfortable in bed, NAD, all lines intact, all precautions maintained, with call bell in reach, and RN aware.

## 2022-10-14 NOTE — CONSULT NOTE ADULT - PROBLEM SELECTOR RECOMMENDATION 9
Agree with admission to medicine for further evaluation  MRI with and without contrast Cervical, thoracic, and lumbar spine  Medical optimization and clearance in the event patient requires a surgical procedure
Uncontrolled with current regimen  Suspicion for MM - pending bx results  Suspected cord compression from imaging, no reported neurologic deficits - neurosurgery following, pending final recs  Would recommend:  - Already on high dose steroids for suspected cord compression and will also help with pain  - Add Dilaludid 4mg PO q3hrs PRN for moderate pain  - Add Dilaudid 1mg IV q3hrs PRN for severe pain  - Will start Gabapentin 300mg QHS, will taper up as tolerated, will increase to 300mg BID on Monday if tolerated well over the weekend  - Add bowel regimen to prevent opioid induced constipation

## 2022-10-14 NOTE — CONSULT NOTE ADULT - PROBLEM SELECTOR RECOMMENDATION 2
Worsening given medical findings  Already on Wellbutrin  Already on PRN bzd, can continue  Support provided

## 2022-10-14 NOTE — PHYSICAL THERAPY INITIAL EVALUATION ADULT - PATIENT PROFILE REVIEW, REHAB EVAL
ACTIVITY: Increase as Tolerated; Spoke with Leeann from Neurosurg (#[id number]) prior--> Pt OK for OOB activity, pt does  not need to wear spinal brace; spoke with BRIAN Hooper prior to PT evaluation--> Pt OK for PT consult/OOB activity./yes

## 2022-10-14 NOTE — CONSULT NOTE ADULT - ASSESSMENT
67 YOM with hx of CABG s/p PCIx3, 50 PY smoking hx, stroke (25 years ago), COPD, GERD, gout, hypothyroidism is here today for 5 days of worsening radiating upper back pain. Palliative Care consulted for complex decision making and symptom  management in the setting of advanced illness.

## 2022-10-14 NOTE — CONSULT NOTE ADULT - PROBLEM SELECTOR RECOMMENDATION 3
PPSV 50%  Needs assistance with most ADLs  Skin care  High risk of further decline given comorbidities

## 2022-10-14 NOTE — PROGRESS NOTE ADULT - PROBLEM SELECTOR PLAN 2
Low dose Sliding scale 14U Lantus; Based on body weight; 60/40 basal bolus, holding bolus in favor of sliding scale, decreased 60 slightly to 14 to be conservative  Moderate Sliding Scale 8U Lantus; 6/6/6  Moderate Sliding Scale

## 2022-10-14 NOTE — PROGRESS NOTE ADULT - ASSESSMENT
67 YOM with known spinal lesions as well as hx of CAD (PCIx3), COPD is here today for 5 days of progressively worsening back pain was found to have lytic lesions concerning for multiple myeloma underwent bone marrow bx today. 67 YOM with known spinal lesions as well as hx of CAD (PCIx3), COPD is here today for 5 days of progressively worsening back pain was found to have lytic lesions concerning for multiple myeloma awaiting further MRI imaging for further characterization as well as biopsy results.

## 2022-10-14 NOTE — PROGRESS NOTE ADULT - PROBLEM SELECTOR PLAN 6
DVT PPX: Heparin SQ  Diet: Regular Diet DVT PPX: Lovenox 40mg (better for cancer patients)  Diet: Regular Diet DVT PPX: Lovenox 40mg (better for cancer patients)  Diet: Regular Diet  Dispo: A/P PT recs; return to Assisted living Home with PT services DVT PPX: Lovenox 40mg (better for cancer patients)  Diet: Regular Diet  Dispo: A/P PT recs; return to Assisted living Home with PT services  Bowel Regiment: 2 Senna, PEG 17, Bisacodyl

## 2022-10-14 NOTE — PROGRESS NOTE ADULT - PROBLEM SELECTOR PLAN 1
#Lytic lesions on the bones  BM Bx; IR consult if BM is negative  Decadron 4q6 after BM Bx; would likely need Bactrim PPX  SPEP; kappa/lambda positive    #Concern for multiple myeloma (ALRISA, Lytic bone lesions, mildly elevated calcium)  Appreciate H/O Recs BM Bx today: Labs: Immunofixation, B2 Microglobulin, SPEP, UPEP/VERONICA, Free Light Chains, Immunoglobulin panel    BM Biopsy Tomorrow #Lytic lesions on the bones  BM Bx; IR consult if BM is negative  Decadron 4q6 after BM Bx; would likely need Bactrim PPX  SPEP; kappa/lambda elevated light chain ratio    #Concern for multiple myeloma (LARISA, Lytic bone lesions, mildly elevated calcium)  Appreciate H/O Recs BM Bx today: Labs: Immunofixation, B2 Microglobulin, SPEP, UPEP/VERONICA, Free Light Chains, Immunoglobulin panel    BM Biopsy Tomorrow

## 2022-10-14 NOTE — CONSULT NOTE ADULT - PROBLEM SELECTOR RECOMMENDATION 5
Pt is full code  Surrogate is pts sister - will complete HCP  Will continue to follow  Page for uncontrolled symptoms [id number]

## 2022-10-14 NOTE — PHYSICAL THERAPY INITIAL EVALUATION ADULT - PERTINENT HX OF CURRENT PROBLEM, REHAB EVAL
67 YOM with known spinal lesions as well as hx of CAD (PCIx3), COPD is here today for 5 days of progressively worsening back pain was found to have lytic lesions concerning for multiple myeloma. CT (+)Interval increase of numerous bilateral lytic metastatic bone lesions. New pathologic fracture of left posterolateral 9th rib. Mild compression deformity of T8 vertebral body new from prior. At the level of T5 there is expansion of the posterior vertebral body lesion into the spinal canal and most likely cord compression

## 2022-10-14 NOTE — CHART NOTE - NSCHARTNOTEFT_GEN_A_CORE
Patient remains neuro stable, now on decadron 4q6 after bone marrow biopsy. Will fu path. Labs remain c/w MM.     Patient was unable to tolerate complete MRI. Please reorder/reschedule MRI Thoracic spine w/wo contrast as soon as possible and pre-medicate for pain control/anxiety as needed to evaluate T5 lesion.    Will cont to follow. Appreciate heme/onc and medicine mgmt. Will likely need radiation oncology consult for RT.

## 2022-10-14 NOTE — CONSULT NOTE ADULT - SUBJECTIVE AND OBJECTIVE BOX
HPI:  67 YOM with hx of CABG s/p PCIx3, 48 PY smoking hx, stroke (25 years ago), COPD, GERD, gout, hypothyroidism is here today for 5 days of worsening radiating upper back pain. Patient was recently admitted to [facility] in July for chest pain and a syncopal episode and was cleared with no intervention. Patient was also planned for IR biopsy of his thoracic lesion with IR, but was unable to go to outpatient appointment and never followed up. He reports 5 days ago, he began to get worsening back pain as well as chest tightness that worsened when he coughed. Reports that his tylenol pain medications were insufficient in managing his pain.    In the ED; patient arrived with T 98.7, /68, HR 66, O2 sat 100%; went through CT scan which showed progression of his T Spine lesions as well as suspected pathologic fracture. NSGY was consulted, but ruled no urgent intervention in favor of further MM workup. (12 Oct 2022 07:20)    PERTINENT PM/SXH:   CAD (coronary artery disease)    Gout    Chronic obstructive pulmonary disease (COPD)    Hypothyroidism    GERD (gastroesophageal reflux disease)      No significant past surgical history      FAMILY HISTORY:  Family hx of lung cancer (Mother)      Family Hx substance abuse [ ]yes [ ]no  ITEMS NOT CHECKED ARE NOT PRESENT    SOCIAL HISTORY:   Significant other/partner[ ]  Children[ ]  Gnosticism/Spirituality:  Substance hx:  [ ]   Tobacco hx:  [ ]   Alcohol hx: [ ]   Home Opioid hx:  [ ] I-Stop Reference No:  Living Situation: [ ]Home  [ ]Long term care  [ ]Rehab [ ]Other    ADVANCE DIRECTIVES:    DNR/MOLST  [ ]  Living Will  [ ]   DECISION MAKER(s):  [ ] Health Care Proxy(s)  [ ] Surrogate(s)  [ ] Guardian           Name(s): Phone Number(s):    BASELINE (I)ADL(s) (prior to admission):  [place]: [ ]Total  [ ] Moderate [ ]Dependent    Allergies    No Known Allergies    Intolerances    MEDICATIONS  (STANDING):  albuterol/ipratropium for Nebulization 3 milliLiter(s) Nebulizer every 8 hours  allopurinol 100 milliGRAM(s) Oral daily  aspirin  chewable 81 milliGRAM(s) Oral daily  atorvastatin 80 milliGRAM(s) Oral at bedtime  bisacodyl 5 milliGRAM(s) Oral at bedtime  budesonide 160 MICROgram(s)/formoterol 4.5 MICROgram(s) Inhaler 2 Puff(s) Inhalation two times a day  buPROPion XL (24-Hour) . 150 milliGRAM(s) Oral daily  dexAMETHasone     Tablet 4 milliGRAM(s) Oral every 6 hours  dextrose 5%. 1000 milliLiter(s) (100 mL/Hr) IV Continuous <Continuous>  dextrose 5%. 1000 milliLiter(s) (50 mL/Hr) IV Continuous <Continuous>  dextrose 50% Injectable 25 Gram(s) IV Push once  dextrose 50% Injectable 12.5 Gram(s) IV Push once  dextrose 50% Injectable 25 Gram(s) IV Push once  enoxaparin Injectable 40 milliGRAM(s) SubCutaneous every 24 hours  fluticasone propionate 50 MICROgram(s)/spray Nasal Spray 1 Spray(s) Both Nostrils two times a day  gabapentin 300 milliGRAM(s) Oral at bedtime  glucagon  Injectable 1 milliGRAM(s) IntraMuscular once  heparin   Injectable 5000 Unit(s) IV Push once  influenza  Vaccine (HIGH DOSE) 0.7 milliLiter(s) IntraMuscular once  insulin glargine Injectable (LANTUS) 14 Unit(s) SubCutaneous at bedtime  insulin lispro (ADMELOG) corrective regimen sliding scale   SubCutaneous three times a day before meals  insulin lispro (ADMELOG) corrective regimen sliding scale   SubCutaneous at bedtime  levothyroxine 125 MICROGram(s) Oral daily  lidocaine   4% Patch 1 Patch Transdermal daily  lidocaine 2% (Preservative-free) Injectable 20 milliLiter(s) Local Injection once  LORazepam     Tablet 0.5 milliGRAM(s) Oral once  nicotine - 21 mG/24Hr(s) Patch 1 Patch Transdermal daily  polyethylene glycol 3350 17 Gram(s) Oral daily  senna 2 Tablet(s) Oral at bedtime  tamsulosin 0.4 milliGRAM(s) Oral two times a day  traZODone 150 milliGRAM(s) Oral at bedtime    MEDICATIONS  (PRN):  acetaminophen     Tablet .. 650 milliGRAM(s) Oral every 6 hours PRN Temp greater or equal to 38.5C (101.3F), Mild Pain (1 - 3), Moderate Pain (4 - 6)  ALBUTerol    90 MICROgram(s) HFA Inhaler 2 Puff(s) Inhalation every 6 hours PRN Shortness of Breath and/or Wheezing  dextrose Oral Gel 15 Gram(s) Oral once PRN Blood Glucose LESS THAN 70 milliGRAM(s)/deciliter  guaiFENesin Oral Liquid (Sugar-Free) 100 milliGRAM(s) Oral every 6 hours PRN Cough  HYDROmorphone   Tablet 4 milliGRAM(s) Oral every 3 hours PRN Moderate Pain (4 - 6)  HYDROmorphone  Injectable 1 milliGRAM(s) IV Push every 3 hours PRN Severe Pain (7 - 10)    PRESENT SYMPTOMS: [ ]Unable to self-report  [ ] CPOT [ ] PAINADs [ ] RDOS  Source if other than patient:  [ ]Family   [ ]Team     Pain: [ ]yes [ ]no  QOL impact -   Location -                    Aggravating factors -  Quality -  Radiation -  Timing-  Severity (0-10 scale):  Minimal acceptable level (0-10 scale):     CPOT:    https://www.scc.org/getattachment/six57g23-8n0c-1e3f-4i7h-7079l3260k5f/Critical-Care-Pain-Observation-Tool-(CPOT)    PAIN AD Score:   http://geriatrictoolkit.[facility]/cog/painad.pdf (press ctrl +  left click to view)    Dyspnea:                           [ ]Mild [ ]Moderate [ ]Severe      RDOS:  0 to 2  minimal or no respiratory distress   3  mild distress  4 to 6 moderate distress  >7 severe distress  https://[email or web address]/handouts/hen/Respiratory_Distress_Observation_Scale.pdf (Ctrl +  left click to view)     Anxiety:                             [ ]Mild [ ]Moderate [ ]Severe  Fatigue:                             [ ]Mild [ ]Moderate [ ]Severe  Nausea:                             [ ]Mild [ ]Moderate [ ]Severe  Loss of appetite:              [ ]Mild [ ]Moderate [ ]Severe  Constipation:                    [ ]Mild [ ]Moderate [ ]Severe    PCSSQ[Palliative Care Spiritual Screening Question]   Severity (0-10):  Score of 4 or > indicate consideration of Chaplaincy referral.    Chaplaincy Referral: [ ] yes [ ] refused [ ] following    Other Symptoms:  [ ]All other review of systems negative     Palliative Performance Status Version 2:         %    http://npcrc.org/files/news/palliative_performance_scale_ppsv2.pdf  PHYSICAL EXAM:  Vital Signs Last 24 Hrs  T(C): 36.4 (14 Oct 2022 04:50), Max: 36.6 (13 Oct 2022 21:13)  T(F): 97.5 (14 Oct 2022 04:50), Max: 97.8 (13 Oct 2022 21:13)  HR: 78 (14 Oct 2022 07:09) (75 - 97)  BP: 100/69 (14 Oct 2022 04:50) (100/69 - 139/88)  BP(mean): --  RR: 18 (14 Oct 2022 04:50) (18 - 18)  SpO2: 98% (14 Oct 2022 07:09) (98% - 98%)    Parameters below as of 14 Oct 2022 07:09  Patient On (Oxygen Delivery Method): room air     I&O's Summary    13 Oct 2022 07:01  -  14 Oct 2022 07:00  --------------------------------------------------------  IN: 115 mL / OUT: 350 mL / NET: -235 mL      GENERAL: [ ]Cachexia    [ ]Alert  [ ]Oriented x   [ ]Lethargic  [ ]Unarousable  [ ]Verbal  [ ]Non-Verbal  Behavioral:   [ ] Anxiety  [ ] Delirium [ ] Agitation [ ] Other  HEENT:  [ ]Normal   [ ]Dry mouth   [ ]ET Tube/Trach  [ ]Oral lesions  PULMONARY:   [ ]Clear [ ]Tachypnea  [ ]Audible excessive secretions   [ ]Rhonchi        [ ]Right [ ]Left [ ]Bilateral  [ ]Crackles        [ ]Right [ ]Left [ ]Bilateral  [ ]Wheezing     [ ]Right [ ]Left [ ]Bilateral  [ ]Diminished breath sounds [ ]right [ ]left [ ]bilateral  CARDIOVASCULAR:    [ ]Regular [ ]Irregular [ ]Tachy  [ ]Demond [ ]Murmur [ ]Other  GASTROINTESTINAL:  [ ]Soft  [ ]Distended   [ ]+BS  [ ]Non tender [ ]Tender  [ ]Other [ ]PEG [ ]OGT/ NGT  Last BM:  GENITOURINARY:  [ ]Normal [ ] Incontinent   [ ]Oliguria/Anuria   [ ]Hebert  MUSCULOSKELETAL:   [ ]Normal   [ ]Weakness  [ ]Bed/Wheelchair bound [ ]Edema  NEUROLOGIC:   [ ]No focal deficits  [ ]Cognitive impairment  [ ]Dysphagia [ ]Dysarthria [ ]Paresis [ ]Other   SKIN:   [ ]Normal  [ ]Rash  [ ]Other  [ ]Pressure ulcer(s)       Present on admission [ ]y [ ]n    CRITICAL CARE:  [ ] Shock Present  [ ]Septic [ ]Cardiogenic [ ]Neurologic [ ]Hypovolemic  [ ]  Vasopressors [ ]  Inotropes   [ ]Respiratory failure present [ ]Mechanical ventilation [ ]Non-invasive ventilatory support [ ]High flow    [ ]Acute  [ ]Chronic [ ]Hypoxic  [ ]Hypercarbic [ ]Other  [ ]Other organ failure     LABS:                        14.1   8.49  )-----------( 142      ( 14 Oct 2022 05:48 )             43.2   10-14    132<L>  |  96<L>  |  32<H>  ----------------------------<  228<H>  5.2   |  25  |  1.27    Ca    9.5      14 Oct 2022 05:48  Phos  4.1     10-14  Mg     1.60     10-14    TPro  8.4<H>  /  Alb  3.1<L>  /  TBili  0.4  /  DBili  x   /  AST  16  /  ALT  16  /  AlkPhos  61  10-14        RADIOLOGY & ADDITIONAL STUDIES:    PROTEIN CALORIE MALNUTRITION PRESENT: [ ]mild [ ]moderate [ ]severe [ ]underweight [ ]morbid obesity  https://www.andeal.org/vault/2440/web/files/ONC/Table_Clinical%20Characteristics%20to%20Document%20Malnutrition-White%20JV%20et%20al%575810.pdf    Height (cm): 160 (10-12-22 @ 14:15), 160 (07-19-22 @ 03:07), 154.99 (06-16-22 @ 13:00)  Weight (kg): 66.5 (10-12-22 @ 14:15), 74.843 (07-19-22 @ 03:07), [id number] (06-16-22 @ 13:00)  BMI (kg/m2): 26 (10-12-22 @ 14:15), 29.2 (07-19-22 @ 03:07), 29.4 (06-16-22 @ 13:00)    [ ]PPSV2 < or = to 30% [ ]significant weight loss  [ ]poor nutritional intake  [ ]anasarca[ ]Artificial Nutrition      Other REFERRALS:  [ ]Hospice  [ ]Child Life  [ ]Social Work  [ ]Case management [ ]Holistic Therapy     Goals of Care Document:  HPI:  67 YOM with hx of CABG s/p PCIx3, 48 PY smoking hx, stroke (25 years ago), COPD, GERD, gout, hypothyroidism is here today for 5 days of worsening radiating upper back pain. Patient was recently admitted to [facility] in July for chest pain and a syncopal episode and was cleared with no intervention. Patient was also planned for IR biopsy of his thoracic lesion with IR, but was unable to go to outpatient appointment and never followed up. He reports 5 days ago, he began to get worsening back pain as well as chest tightness that worsened when he coughed. Reports that his tylenol pain medications were insufficient in managing his pain.    In the ED; patient arrived with T 98.7, /68, HR 66, O2 sat 100%; went through CT scan which showed progression of his T Spine lesions as well as suspected pathologic fracture. NSGY was consulted, but ruled no urgent intervention in favor of further MM workup. (12 Oct 2022 07:20)    PERTINENT PM/SXH:   CAD (coronary artery disease)    Gout    Chronic obstructive pulmonary disease (COPD)    Hypothyroidism    GERD (gastroesophageal reflux disease)      No significant past surgical history      FAMILY HISTORY:  Family hx of lung cancer (Mother)      Family Hx substance abuse [ ]yes [ ]no  ITEMS NOT CHECKED ARE NOT PRESENT    SOCIAL HISTORY:   Significant other/partner[ ]  Children[ ]  Caodaism/Spirituality:  Substance hx:  [ ]   Tobacco hx:  [ ]   Alcohol hx: [ ]   Home Opioid hx:  [ ] I-Stop Reference No:  Living Situation: [ ]Home  [ ]Long term care  [ ]Rehab [x ]Other: Assisted Living Facility     ADVANCE DIRECTIVES:    DNR/MOLST  [ ]  Living Will  [ ]   DECISION MAKER(s):  [ ] Health Care Proxy(s)  [x ] Surrogate(s)  [ ] Guardian           Name(s): Phone Number(s):  Sister Albert Nelson    BASELINE (I)ADL(s) (prior to admission):  [place]: [ ]Total  [x ] Moderate [ ]Dependent    Allergies    No Known Allergies    Intolerances    MEDICATIONS  (STANDING):  albuterol/ipratropium for Nebulization 3 milliLiter(s) Nebulizer every 8 hours  allopurinol 100 milliGRAM(s) Oral daily  aspirin  chewable 81 milliGRAM(s) Oral daily  atorvastatin 80 milliGRAM(s) Oral at bedtime  bisacodyl 5 milliGRAM(s) Oral at bedtime  budesonide 160 MICROgram(s)/formoterol 4.5 MICROgram(s) Inhaler 2 Puff(s) Inhalation two times a day  buPROPion XL (24-Hour) . 150 milliGRAM(s) Oral daily  dexAMETHasone     Tablet 4 milliGRAM(s) Oral every 6 hours  dextrose 5%. 1000 milliLiter(s) (100 mL/Hr) IV Continuous <Continuous>  dextrose 5%. 1000 milliLiter(s) (50 mL/Hr) IV Continuous <Continuous>  dextrose 50% Injectable 25 Gram(s) IV Push once  dextrose 50% Injectable 12.5 Gram(s) IV Push once  dextrose 50% Injectable 25 Gram(s) IV Push once  enoxaparin Injectable 40 milliGRAM(s) SubCutaneous every 24 hours  fluticasone propionate 50 MICROgram(s)/spray Nasal Spray 1 Spray(s) Both Nostrils two times a day  gabapentin 300 milliGRAM(s) Oral at bedtime  glucagon  Injectable 1 milliGRAM(s) IntraMuscular once  heparin   Injectable 5000 Unit(s) IV Push once  influenza  Vaccine (HIGH DOSE) 0.7 milliLiter(s) IntraMuscular once  insulin glargine Injectable (LANTUS) 14 Unit(s) SubCutaneous at bedtime  insulin lispro (ADMELOG) corrective regimen sliding scale   SubCutaneous three times a day before meals  insulin lispro (ADMELOG) corrective regimen sliding scale   SubCutaneous at bedtime  levothyroxine 125 MICROGram(s) Oral daily  lidocaine   4% Patch 1 Patch Transdermal daily  lidocaine 2% (Preservative-free) Injectable 20 milliLiter(s) Local Injection once  LORazepam     Tablet 0.5 milliGRAM(s) Oral once  nicotine - 21 mG/24Hr(s) Patch 1 Patch Transdermal daily  polyethylene glycol 3350 17 Gram(s) Oral daily  senna 2 Tablet(s) Oral at bedtime  tamsulosin 0.4 milliGRAM(s) Oral two times a day  traZODone 150 milliGRAM(s) Oral at bedtime    MEDICATIONS  (PRN):  acetaminophen     Tablet .. 650 milliGRAM(s) Oral every 6 hours PRN Temp greater or equal to 38.5C (101.3F), Mild Pain (1 - 3), Moderate Pain (4 - 6)  ALBUTerol    90 MICROgram(s) HFA Inhaler 2 Puff(s) Inhalation every 6 hours PRN Shortness of Breath and/or Wheezing  dextrose Oral Gel 15 Gram(s) Oral once PRN Blood Glucose LESS THAN 70 milliGRAM(s)/deciliter  guaiFENesin Oral Liquid (Sugar-Free) 100 milliGRAM(s) Oral every 6 hours PRN Cough  HYDROmorphone   Tablet 4 milliGRAM(s) Oral every 3 hours PRN Moderate Pain (4 - 6)  HYDROmorphone  Injectable 1 milliGRAM(s) IV Push every 3 hours PRN Severe Pain (7 - 10)    PRESENT SYMPTOMS: [ ]Unable to self-report  [ ] CPOT [ ] PAINADs [ ] RDOS  Source if other than patient:  [ ]Family   [ ]Team     Pain: [x ]yes [ ]no  QOL impact - unable to move well or sleep due to pain  Location -  upper and mid back                  Aggravating factors - moving  Quality - aching, stabbing  Radiation - across chest  Timing- constant  Severity (0-10 scale): 10/10  Minimal acceptable level (0-10 scale): 4/10    CPOT:    https://www.[facility].org/getattachment/[email or web address]-1657i7033w1z/Critical-Care-Pain-Observation-Tool-(CPOT)    PAIN AD Score:   http://geriatrictoolkit.[facility]/cog/painad.pdf (press ctrl +  left click to view)    Dyspnea:                           [ ]Mild [ ]Moderate [ ]Severe      RDOS:  0 to 2  minimal or no respiratory distress   3  mild distress  4 to 6 moderate distress  >7 severe distress  https://[email or web address]/handouts/hen/Respiratory_Distress_Observation_Scale.pdf (Ctrl +  left click to view)     Anxiety:                             [ ]Mild [ x]Moderate [ ]Severe  Fatigue:                             [ ]Mild [ ]Moderate [ ]Severe  Nausea:                             [ ]Mild [ ]Moderate [ ]Severe  Loss of appetite:              [ ]Mild [ ]Moderate [ ]Severe  Constipation:                    [ x]Mild [ ]Moderate [ ]Severe    PCSSQ[Palliative Care Spiritual Screening Question]   Severity (0-10):  Score of 4 or > indicate consideration of Chaplaincy referral.    Chaplaincy Referral: [ ] yes [ ] refused [ ] following    Other Symptoms:  [x ]All other review of systems negative     Palliative Performance Status Version 2:  50 %    http://npcrc.org/files/news/palliative_performance_scale_ppsv2.pdf    PHYSICAL EXAM:  Vital Signs Last 24 Hrs  T(C): 36.4 (14 Oct 2022 04:50), Max: 36.6 (13 Oct 2022 21:13)  T(F): 97.5 (14 Oct 2022 04:50), Max: 97.8 (13 Oct 2022 21:13)  HR: 78 (14 Oct 2022 07:09) (75 - 97)  BP: 100/69 (14 Oct 2022 04:50) (100/69 - 139/88)  BP(mean): --  RR: 18 (14 Oct 2022 04:50) (18 - 18)  SpO2: 98% (14 Oct 2022 07:09) (98% - 98%)    Parameters below as of 14 Oct 2022 07:09  Patient On (Oxygen Delivery Method): room air     I&O's Summary    13 Oct 2022 07:01  -  14 Oct 2022 07:00  --------------------------------------------------------  IN: 115 mL / OUT: 350 mL / NET: -235 mL      GENERAL: [ ]Cachexia  hard of hearing  [x ]Alert  [x ]Oriented x 3  [ ]Lethargic  [ ]Unarousable  [ x]Verbal  [ ]Non-Verbal  Behavioral:   [ ] Anxiety  [ ] Delirium [ ] Agitation [ ] Other  HEENT:  [ ]Normal   [x ]Dry mouth   [ ]ET Tube/Trach  [ ]Oral lesions  PULMONARY:   [ ]Clear [ ]Tachypnea  [ ]Audible excessive secretions   [ x]Rhonchi        [ ]Right [ ]Left [ ]Bilateral  [ ]Crackles        [ ]Right [ ]Left [ ]Bilateral  [ ]Wheezing     [ ]Right [ ]Left [ ]Bilateral  [ ]Diminished breath sounds [ ]right [ ]left [ ]bilateral  CARDIOVASCULAR:    [x ]Regular [ ]Irregular [ ]Tachy  [ ]Demond [ ]Murmur [ ]Other  GASTROINTESTINAL:  [ x]Soft  [ ]Distended   [x ]+BS  x[ ]Non tender [ ]Tender  [ ]Other [ ]PEG [ ]OGT/ NGT  Last BM:10/12  GENITOURINARY:  [x ]Normal [ ] Incontinent   [ ]Oliguria/Anuria   [ ]Hebert  MUSCULOSKELETAL:   [ ]Normal   [ x]Weakness  [ ]Bed/Wheelchair bound [ ]Edema  NEUROLOGIC:   [x ]No focal deficits  [ ]Cognitive impairment  [ ]Dysphagia [ ]Dysarthria [ ]Paresis [ ]Other   SKIN:   [ x]Normal  [ ]Rash  [ ]Other  [ ]Pressure ulcer(s)       Present on admission [ ]y [ ]n    CRITICAL CARE:  [ ] Shock Present  [ ]Septic [ ]Cardiogenic [ ]Neurologic [ ]Hypovolemic  [ ]  Vasopressors [ ]  Inotropes   [ ]Respiratory failure present [ ]Mechanical ventilation [ ]Non-invasive ventilatory support [ ]High flow    [ ]Acute  [ ]Chronic [ ]Hypoxic  [ ]Hypercarbic [ ]Other  [ ]Other organ failure     LABS:                        14.1   8.49  )-----------( 142      ( 14 Oct 2022 05:48 )             43.2   10-14    132<L>  |  96<L>  |  32<H>  ----------------------------<  228<H>  5.2   |  25  |  1.27    Ca    9.5      14 Oct 2022 05:48  Phos  4.1     10-14  Mg     1.60     10-14    TPro  8.4<H>  /  Alb  3.1<L>  /  TBili  0.4  /  DBili  x   /  AST  16  /  ALT  16  /  AlkPhos  61  10-14    RADIOLOGY & ADDITIONAL STUDIES:  < from: MR Thoracic Spine No Cont (10.13.22 @ 17:50) >  IMPRESSION: Diffuse osseous metastasis are again seen and increased in   size when compared with the prior exam. Epidural extension of tumor   spinal cord compression is suspected T5 and T6 levels as well as disc   herniation and spinal cord compression seen at the T5-6 disc level.   Complete MRI of thoracic spine with contrast is recommended patient when   patient can tolerate it or when sedation be given.    < from: CT Angio Chest Aorta w/wo IV Cont (10.11.22 @ 20:19) >  IMPRESSION:  No dissection.  Bronchial/bronchiolar wall thickening, centrilobular micronodules and   indistinct groundglass opacities within left upper lobe can represent   bronchiolitis.  Previously FDG avid 1 cm inferior left renal lesion is unchanged in size.  Interval increase of numerous bilateral lytic metastatic bone lesions.   New pathologic fracture of left posterolateral 9th rib. Mild compression   deformity of T8 vertebral body new from prior.  At the level of T5 there is expansion of the posterior vertebral body   lesion into the spinal canal and most likely cord compression. RECOMMEND   further evaluation with thoracic spine MRI.    PROTEIN CALORIE MALNUTRITION PRESENT: [ ]mild [ ]moderate [ ]severe [ ]underweight [ ]morbid obesity  https://www.andeal.org/vault/2440/web/files/ONC/Table_Clinical%20Characteristics%20to%20Document%20Malnutrition-White%20JV%20et%20al%456714.pdf    Height (cm): 160 (10-12-22 @ 14:15), 160 (07-19-22 @ 03:07), 154.99 (06-16-22 @ 13:00)  Weight (kg): 66.5 (10-12-22 @ 14:15), 74.843 (07-19-22 @ 03:07), 70.22792643190389 (06-16-22 @ 13:00)  BMI (kg/m2): 26 (10-12-22 @ 14:15), 29.2 (07-19-22 @ 03:07), 29.4 (06-16-22 @ 13:00)    [ ]PPSV2 < or = to 30% [ ]significant weight loss  [ ]poor nutritional intake  [ ]anasarca[ ]Artificial Nutrition      Other REFERRALS:  [ ]Hospice  [ ]Child Life  [ ]Social Work  [ ]Case management [ ]Holistic Therapy     Goals of Care Document:

## 2022-10-14 NOTE — PROGRESS NOTE ADULT - SUBJECTIVE AND OBJECTIVE BOX
INCOMPLETE NOTE    Jesus Vázquez | PGY1| Pager: [phone]  Interval Events:    REVIEW OF SYSTEMS:  CONSTITUTIONAL: No weakness, fevers or chills  EYES/ENT: No visual changes;  No vertigo or throat pain   NECK: No pain or stiffness  RESPIRATORY: No cough, wheezing, hemoptysis; No shortness of breath  CARDIOVASCULAR: No chest pain or palpitations  GASTROINTESTINAL: No abdominal or epigastric pain. No nausea, vomiting, or hematemesis; No diarrhea or constipation. No melena or hematochezia.  GENITOURINARY: No dysuria, frequency or hematuria  NEUROLOGICAL: No numbness or weakness  SKIN: No itching, burning, rashes, or lesions   All other review of systems is negative unless indicated above.    OBJECTIVE:  ICU Vital Signs Last 24 Hrs  T(C): 36.4 (14 Oct 2022 04:50), Max: 36.6 (13 Oct 2022 21:13)  T(F): 97.5 (14 Oct 2022 04:50), Max: 97.8 (13 Oct 2022 21:13)  HR: 75 (14 Oct 2022 04:50) (59 - 97)  BP: 100/69 (14 Oct 2022 04:50) (100/69 - 139/88)  BP(mean): --  ABP: --  ABP(mean): --  RR: 18 (14 Oct 2022 04:50) (18 - 18)  SpO2: 98% (14 Oct 2022 04:50) (98% - 100%)    O2 Parameters below as of 14 Oct 2022 04:50  Patient On (Oxygen Delivery Method): room air              10-12 @ 07:01  -  10-13 @ 07:00  --------------------------------------------------------  IN: 0 mL / OUT: 550 mL / NET: -550 mL    10-13 @ 07:01  -  10-14 @ 06:20  --------------------------------------------------------  IN: 115 mL / OUT: 350 mL / NET: -235 mL      CAPILLARY BLOOD GLUCOSE      POCT Blood Glucose.: 205 mg/dL (13 Oct 2022 21:08)      PHYSICAL EXAM:  General: WN/WD NAD  Neurology: A&Ox3, nonfocal, FOWLER x 4  Eyes: PERRLA/ EOMI, Gross vision intact  ENT/Neck: Neck supple, trachea midline, No JVD, Gross hearing intact  Respiratory: CTA B/L, No wheezing, rales, rhonchi  CV: RRR, +S1/S2, -S3/S4, no murmurs, rubs or gallops  Abdominal: Soft, NT, ND +BS, No HSM  MSK: 5/5 strength UE/LE bilaterally  Extremities: No edema, 2+ peripheral pulses  Skin: No Rashes, Hematoma, Ecchymosis  Incisions:   Tubes:    HOSPITAL MEDICATIONS:  MEDICATIONS  (STANDING):  albuterol/ipratropium for Nebulization 3 milliLiter(s) Nebulizer every 8 hours  allopurinol 100 milliGRAM(s) Oral daily  aspirin  chewable 81 milliGRAM(s) Oral daily  atorvastatin 80 milliGRAM(s) Oral at bedtime  budesonide 160 MICROgram(s)/formoterol 4.5 MICROgram(s) Inhaler 2 Puff(s) Inhalation two times a day  buPROPion XL (24-Hour) . 150 milliGRAM(s) Oral daily  dexAMETHasone     Tablet 4 milliGRAM(s) Oral every 6 hours  dextrose 5%. 1000 milliLiter(s) (50 mL/Hr) IV Continuous <Continuous>  dextrose 5%. 1000 milliLiter(s) (100 mL/Hr) IV Continuous <Continuous>  dextrose 50% Injectable 25 Gram(s) IV Push once  dextrose 50% Injectable 12.5 Gram(s) IV Push once  dextrose 50% Injectable 25 Gram(s) IV Push once  fluticasone propionate 50 MICROgram(s)/spray Nasal Spray 1 Spray(s) Both Nostrils two times a day  glucagon  Injectable 1 milliGRAM(s) IntraMuscular once  heparin   Injectable 5000 Unit(s) IV Push once  influenza  Vaccine (HIGH DOSE) 0.7 milliLiter(s) IntraMuscular once  insulin lispro (ADMELOG) corrective regimen sliding scale   SubCutaneous three times a day before meals  insulin lispro (ADMELOG) corrective regimen sliding scale   SubCutaneous at bedtime  levothyroxine 125 MICROGram(s) Oral daily  lidocaine   4% Patch 1 Patch Transdermal daily  lidocaine 2% (Preservative-free) Injectable 20 milliLiter(s) Local Injection once  nicotine - 21 mG/24Hr(s) Patch 1 Patch Transdermal daily  polyethylene glycol 3350 17 Gram(s) Oral daily  senna 2 Tablet(s) Oral at bedtime  tamsulosin 0.4 milliGRAM(s) Oral two times a day  traZODone 150 milliGRAM(s) Oral at bedtime    MEDICATIONS  (PRN):  acetaminophen     Tablet .. 650 milliGRAM(s) Oral every 6 hours PRN Temp greater or equal to 38.5C (101.3F), Mild Pain (1 - 3), Moderate Pain (4 - 6)  ALBUTerol    90 MICROgram(s) HFA Inhaler 2 Puff(s) Inhalation every 6 hours PRN Shortness of Breath and/or Wheezing  dextrose Oral Gel 15 Gram(s) Oral once PRN Blood Glucose LESS THAN 70 milliGRAM(s)/deciliter  guaiFENesin Oral Liquid (Sugar-Free) 100 milliGRAM(s) Oral every 6 hours PRN Cough  HYDROmorphone   Tablet 2 milliGRAM(s) Oral every 4 hours PRN Severe Pain (7 - 10)  HYDROmorphone   Tablet 1 milliGRAM(s) Oral every 4 hours PRN Moderate Pain (4 - 6)  HYDROmorphone  Injectable 0.2 milliGRAM(s) IV Push every 4 hours PRN Severe Pain (7 - 10)      LABS:                        14.5   10.67 )-----------( 138      ( 12 Oct 2022 08:48 )             45.3     Hgb Trend: 14.5<--, 14.5<--  10-12    133<L>  |  96<L>  |  30<H>  ----------------------------<  89  4.5   |  28  |  1.32<H>    Ca    9.4      12 Oct 2022 08:48  Phos  3.9     10-12  Mg     1.70     10-12    TPro  8.1  /  Alb  2.75<L>  /  TBili  0.6  /  DBili  x   /  AST  19  /  ALT  15  /  AlkPhos  61  10-12    Creatinine Trend: 1.32<--, 1.42<--            MICROBIOLOGY:      eJsus Vázquez | PGY1| Pager: [phone]  Interval Events: No new events overnight; patient denies any red flag symptoms; reports pain is improved with dilaudid, but still present; reports he is constipated    REVIEW OF SYSTEMS:  CONSTITUTIONAL: continued upper back pain on the right side, no new radiation of pain  EYES/ENT: No visual changes;  No vertigo or throat pain   NECK: No pain or stiffness  RESPIRATORY: No cough, wheezing, hemoptysis; No shortness of breath  CARDIOVASCULAR: No chest pain or palpitations  GASTROINTESTINAL: No abdominal or epigastric pain.  GENITOURINARY: denies new worsening of urinatino  NEUROLOGICAL: No numbness or weakness  SKIN: No itching, burning, rashes, or lesions   All other review of systems is negative unless indicated above.    OBJECTIVE:  ICU Vital Signs Last 24 Hrs  T(C): 36.4 (14 Oct 2022 04:50), Max: 36.6 (13 Oct 2022 21:13)  T(F): 97.5 (14 Oct 2022 04:50), Max: 97.8 (13 Oct 2022 21:13)  HR: 75 (14 Oct 2022 04:50) (59 - 97)  BP: 100/69 (14 Oct 2022 04:50) (100/69 - 139/88)  BP(mean): --  ABP: --  ABP(mean): --  RR: 18 (14 Oct 2022 04:50) (18 - 18)  SpO2: 98% (14 Oct 2022 04:50) (98% - 100%)    O2 Parameters below as of 14 Oct 2022 04:50  Patient On (Oxygen Delivery Method): room air              10-12 @ 07:01  -  10-13 @ 07:00  --------------------------------------------------------  IN: 0 mL / OUT: 550 mL / NET: -550 mL    10-13 @ 07:01  -  10-14 @ 06:20  --------------------------------------------------------  IN: 115 mL / OUT: 350 mL / NET: -235 mL      CAPILLARY BLOOD GLUCOSE      POCT Blood Glucose.: 205 mg/dL (13 Oct 2022 21:08)      PHYSICAL EXAM:  General: NAD  Neurology: A&Ox3, nonfocal, FOWLER x 4  Eyes: PERRLA/ EOMI, Gross vision intact  ENT/Neck: Neck supple, trachea midline, Gross hearing intact  Respiratory: CTA B/L, No wheezing, rales, rhonchi  CV: RRR, +S1/S2, -S3/S4, no murmurs, rubs or gallops  Abdominal: Soft, NT, ND +BS, No HSM  MSK: 4/5 LE Bilaterally, 5/5 UE Bilaterally  Extremities: No edema, 2+ peripheral pulses  Skin: No Rashes, Hematoma, Ecchymosis      HOSPITAL MEDICATIONS:  MEDICATIONS  (STANDING):  albuterol/ipratropium for Nebulization 3 milliLiter(s) Nebulizer every 8 hours  allopurinol 100 milliGRAM(s) Oral daily  aspirin  chewable 81 milliGRAM(s) Oral daily  atorvastatin 80 milliGRAM(s) Oral at bedtime  budesonide 160 MICROgram(s)/formoterol 4.5 MICROgram(s) Inhaler 2 Puff(s) Inhalation two times a day  buPROPion XL (24-Hour) . 150 milliGRAM(s) Oral daily  dexAMETHasone     Tablet 4 milliGRAM(s) Oral every 6 hours  dextrose 5%. 1000 milliLiter(s) (50 mL/Hr) IV Continuous <Continuous>  dextrose 5%. 1000 milliLiter(s) (100 mL/Hr) IV Continuous <Continuous>  dextrose 50% Injectable 25 Gram(s) IV Push once  dextrose 50% Injectable 12.5 Gram(s) IV Push once  dextrose 50% Injectable 25 Gram(s) IV Push once  fluticasone propionate 50 MICROgram(s)/spray Nasal Spray 1 Spray(s) Both Nostrils two times a day  glucagon  Injectable 1 milliGRAM(s) IntraMuscular once  heparin   Injectable 5000 Unit(s) IV Push once  influenza  Vaccine (HIGH DOSE) 0.7 milliLiter(s) IntraMuscular once  insulin lispro (ADMELOG) corrective regimen sliding scale   SubCutaneous three times a day before meals  insulin lispro (ADMELOG) corrective regimen sliding scale   SubCutaneous at bedtime  levothyroxine 125 MICROGram(s) Oral daily  lidocaine   4% Patch 1 Patch Transdermal daily  lidocaine 2% (Preservative-free) Injectable 20 milliLiter(s) Local Injection once  nicotine - 21 mG/24Hr(s) Patch 1 Patch Transdermal daily  polyethylene glycol 3350 17 Gram(s) Oral daily  senna 2 Tablet(s) Oral at bedtime  tamsulosin 0.4 milliGRAM(s) Oral two times a day  traZODone 150 milliGRAM(s) Oral at bedtime    MEDICATIONS  (PRN):  acetaminophen     Tablet .. 650 milliGRAM(s) Oral every 6 hours PRN Temp greater or equal to 38.5C (101.3F), Mild Pain (1 - 3), Moderate Pain (4 - 6)  ALBUTerol    90 MICROgram(s) HFA Inhaler 2 Puff(s) Inhalation every 6 hours PRN Shortness of Breath and/or Wheezing  dextrose Oral Gel 15 Gram(s) Oral once PRN Blood Glucose LESS THAN 70 milliGRAM(s)/deciliter  guaiFENesin Oral Liquid (Sugar-Free) 100 milliGRAM(s) Oral every 6 hours PRN Cough  HYDROmorphone   Tablet 2 milliGRAM(s) Oral every 4 hours PRN Severe Pain (7 - 10)  HYDROmorphone   Tablet 1 milliGRAM(s) Oral every 4 hours PRN Moderate Pain (4 - 6)  HYDROmorphone  Injectable 0.2 milliGRAM(s) IV Push every 4 hours PRN Severe Pain (7 - 10)      LABS:                        14.5   10.67 )-----------( 138      ( 12 Oct 2022 08:48 )             45.3     Hgb Trend: 14.5<--, 14.5<--  10-12    133<L>  |  96<L>  |  30<H>  ----------------------------<  89  4.5   |  28  |  1.32<H>    Ca    9.4      12 Oct 2022 08:48  Phos  3.9     10-12  Mg     1.70     10-12    TPro  8.1  /  Alb  2.75<L>  /  TBili  0.6  /  DBili  x   /  AST  19  /  ALT  15  /  AlkPhos  61  10-12    Creatinine Trend: 1.32<--, 1.42<--            MICROBIOLOGY:

## 2022-10-15 NOTE — DIETITIAN INITIAL EVALUATION ADULT - SIGNS/SYMPTOMS
12% weight loss with <75% of estimated nutrition needs met over 3 months PTA concern for Multiple Myeloma with metastasis to spinal column

## 2022-10-15 NOTE — PROGRESS NOTE ADULT - PROBLEM SELECTOR PLAN 6
DVT PPX: Lovenox 40mg (better for cancer patients)  Diet: Regular Diet  Dispo: A/P PT recs; return to Assisted living Home with PT services  Bowel Regiment: 2 Senna, PEG 17, Bisacodyl

## 2022-10-15 NOTE — PROGRESS NOTE ADULT - SUBJECTIVE AND OBJECTIVE BOX
Bello Leiva MD  Internal Medicine PGY-2      PROGRESS NOTE:     Patient is a 67y old  Male who presents with a chief complaint of Worsening back pain (14 Oct 2022 13:13)      SUBJECTIVE / OVERNIGHT EVENTS:    No acute events overnight. Patient examined at bedside with no acute complaints.     Pain:  Bowel Movements:  Urination:  OOB:  PT:    REVIEW OF SYSTEMS:    CONSTITUTIONAL: No weakness, fevers or chills  EYES/ENT: No visual changes;  No vertigo or throat pain   NECK: No pain or stiffness  RESPIRATORY: No cough, wheezing, hemoptysis; No shortness of breath  CARDIOVASCULAR: No chest pain or palpitations  GASTROINTESTINAL: No abdominal or epigastric pain. No nausea, vomiting, or hematemesis; No diarrhea or constipation. No melena or hematochezia.  GENITOURINARY: No dysuria, frequency or hematuria  NEUROLOGICAL: No numbness or weakness  SKIN: No itching, rashes      MEDICATIONS  (STANDING):  albuterol/ipratropium for Nebulization 3 milliLiter(s) Nebulizer every 8 hours  allopurinol 100 milliGRAM(s) Oral daily  aspirin  chewable 81 milliGRAM(s) Oral daily  atorvastatin 80 milliGRAM(s) Oral at bedtime  bisacodyl 5 milliGRAM(s) Oral at bedtime  budesonide 160 MICROgram(s)/formoterol 4.5 MICROgram(s) Inhaler 2 Puff(s) Inhalation two times a day  buPROPion XL (24-Hour) . 150 milliGRAM(s) Oral daily  dexAMETHasone     Tablet 4 milliGRAM(s) Oral every 6 hours  dextrose 5%. 1000 milliLiter(s) (100 mL/Hr) IV Continuous <Continuous>  dextrose 5%. 1000 milliLiter(s) (50 mL/Hr) IV Continuous <Continuous>  dextrose 50% Injectable 25 Gram(s) IV Push once  dextrose 50% Injectable 12.5 Gram(s) IV Push once  dextrose 50% Injectable 25 Gram(s) IV Push once  enoxaparin Injectable 40 milliGRAM(s) SubCutaneous every 24 hours  fluticasone propionate 50 MICROgram(s)/spray Nasal Spray 1 Spray(s) Both Nostrils two times a day  gabapentin 300 milliGRAM(s) Oral at bedtime  glucagon  Injectable 1 milliGRAM(s) IntraMuscular once  influenza  Vaccine (HIGH DOSE) 0.7 milliLiter(s) IntraMuscular once  insulin glargine Injectable (LANTUS) 8 Unit(s) SubCutaneous at bedtime  insulin lispro (ADMELOG) corrective regimen sliding scale   SubCutaneous three times a day before meals  insulin lispro (ADMELOG) corrective regimen sliding scale   SubCutaneous at bedtime  insulin lispro Injectable (ADMELOG) 6 Unit(s) SubCutaneous three times a day before meals  levothyroxine 125 MICROGram(s) Oral daily  lidocaine   4% Patch 1 Patch Transdermal daily  lidocaine 2% (Preservative-free) Injectable 20 milliLiter(s) Local Injection once  LORazepam     Tablet 0.5 milliGRAM(s) Oral once  nicotine - 21 mG/24Hr(s) Patch 1 Patch Transdermal daily  polyethylene glycol 3350 17 Gram(s) Oral daily  senna 2 Tablet(s) Oral at bedtime  tamsulosin 0.4 milliGRAM(s) Oral two times a day  traZODone 150 milliGRAM(s) Oral at bedtime    MEDICATIONS  (PRN):  acetaminophen     Tablet .. 650 milliGRAM(s) Oral every 6 hours PRN Temp greater or equal to 38.5C (101.3F), Mild Pain (1 - 3), Moderate Pain (4 - 6)  ALBUTerol    90 MICROgram(s) HFA Inhaler 2 Puff(s) Inhalation every 6 hours PRN Shortness of Breath and/or Wheezing  dextrose Oral Gel 15 Gram(s) Oral once PRN Blood Glucose LESS THAN 70 milliGRAM(s)/deciliter  guaiFENesin Oral Liquid (Sugar-Free) 100 milliGRAM(s) Oral every 6 hours PRN Cough  HYDROmorphone   Tablet 4 milliGRAM(s) Oral every 3 hours PRN Moderate Pain (4 - 6)  HYDROmorphone  Injectable 1 milliGRAM(s) IV Push every 3 hours PRN Severe Pain (7 - 10)      CAPILLARY BLOOD GLUCOSE      POCT Blood Glucose.: 166 mg/dL (14 Oct 2022 22:36)  POCT Blood Glucose.: 352 mg/dL (14 Oct 2022 17:39)  POCT Blood Glucose.: 289 mg/dL (14 Oct 2022 12:05)  POCT Blood Glucose.: 195 mg/dL (14 Oct 2022 08:31)    I&O's Summary    14 Oct 2022 07:01  -  15 Oct 2022 07:00  --------------------------------------------------------  IN: 0 mL / OUT: 800 mL / NET: -800 mL        VITALS:   T(C): 36.4 (10-15-22 @ 06:26), Max: 36.8 (10-14-22 @ 14:23)  HR: 69 (10-15-22 @ 06:26) (69 - 80)  BP: 129/87 (10-15-22 @ 06:26) (114/68 - 129/87)  RR: 16 (10-15-22 @ 06:26) (16 - 18)  SpO2: 95% (10-15-22 @ 06:26) (95% - 99%)    GENERAL: NAD, lying in bed comfortably  HEAD:  Atraumatic, normocephalic  EYES: EOMI, PERRLA, conjunctiva and sclera clear  ENT: Moist mucous membranes  NECK: Supple, no JVD  HEART: Regular rate and rhythm, no murmurs, rubs, or gallops  LUNGS: Unlabored respirations.  Clear to auscultation bilaterally, no crackles, wheezing, or rhonchi  ABDOMEN: Soft, nontender, nondistended, +BS  EXTREMITIES: 2+ peripheral pulses bilaterally. No clubbing, cyanosis, or edema  NERVOUS SYSTEM:  A&Ox3, no focal deficits   SKIN: No rashes or lesions    LABS:                        14.1   8.49  )-----------( 142      ( 14 Oct 2022 05:48 )             43.2     10-14    132<L>  |  96<L>  |  32<H>  ----------------------------<  228<H>  5.2   |  25  |  1.27    Ca    9.5      14 Oct 2022 05:48  Phos  4.1     10-14  Mg     1.60     10-14    TPro  8.4<H>  /  Alb  3.1<L>  /  TBili  0.4  /  DBili  x   /  AST  16  /  ALT  16  /  AlkPhos  61  10-14                RADIOLOGY & ADDITIONAL TESTS:  Results Reviewed:   Imaging Personally Reviewed:  Electrocardiogram Personally Reviewed:    COORDINATION OF CARE:  Care Discussed with Consultants/Other Providers [Y/N]:  Prior or Outpatient Records Reviewed [Y/N]:   Bello Leiva MD  Internal Medicine PGY-2      PROGRESS NOTE:     Patient is a 67y old  Male who presents with a chief complaint of Worsening back pain (14 Oct 2022 13:13)      SUBJECTIVE / OVERNIGHT EVENTS:    No acute events overnight. Patient examined at bedside eating breakfast, still complaining of back pain with minimal improvement on pain medication as well as ongoing abdominal pain RUQ x 1 week    REVIEW OF SYSTEMS:    CONSTITUTIONAL: No weakness, fevers or chills  EYES/ENT: No visual changes;  No vertigo or throat pain   NECK: No pain or stiffness  RESPIRATORY: No cough, wheezing, hemoptysis; No shortness of breath  CARDIOVASCULAR: No chest pain or palpitations  GASTROINTESTINAL: +RUQ pain 1 week, No nausea, vomiting, or hematemesis; No diarrhea or constipation. No melena or hematochezia.  GENITOURINARY: No dysuria, frequency or hematuria  NEUROLOGICAL: No numbness or weakness, +back pain  SKIN: No itching, rashes      MEDICATIONS  (STANDING):  albuterol/ipratropium for Nebulization 3 milliLiter(s) Nebulizer every 8 hours  allopurinol 100 milliGRAM(s) Oral daily  aspirin  chewable 81 milliGRAM(s) Oral daily  atorvastatin 80 milliGRAM(s) Oral at bedtime  bisacodyl 5 milliGRAM(s) Oral at bedtime  budesonide 160 MICROgram(s)/formoterol 4.5 MICROgram(s) Inhaler 2 Puff(s) Inhalation two times a day  buPROPion XL (24-Hour) . 150 milliGRAM(s) Oral daily  dexAMETHasone     Tablet 4 milliGRAM(s) Oral every 6 hours  dextrose 5%. 1000 milliLiter(s) (100 mL/Hr) IV Continuous <Continuous>  dextrose 5%. 1000 milliLiter(s) (50 mL/Hr) IV Continuous <Continuous>  dextrose 50% Injectable 25 Gram(s) IV Push once  dextrose 50% Injectable 12.5 Gram(s) IV Push once  dextrose 50% Injectable 25 Gram(s) IV Push once  enoxaparin Injectable 40 milliGRAM(s) SubCutaneous every 24 hours  fluticasone propionate 50 MICROgram(s)/spray Nasal Spray 1 Spray(s) Both Nostrils two times a day  gabapentin 300 milliGRAM(s) Oral at bedtime  glucagon  Injectable 1 milliGRAM(s) IntraMuscular once  influenza  Vaccine (HIGH DOSE) 0.7 milliLiter(s) IntraMuscular once  insulin glargine Injectable (LANTUS) 8 Unit(s) SubCutaneous at bedtime  insulin lispro (ADMELOG) corrective regimen sliding scale   SubCutaneous three times a day before meals  insulin lispro (ADMELOG) corrective regimen sliding scale   SubCutaneous at bedtime  insulin lispro Injectable (ADMELOG) 6 Unit(s) SubCutaneous three times a day before meals  levothyroxine 125 MICROGram(s) Oral daily  lidocaine   4% Patch 1 Patch Transdermal daily  lidocaine 2% (Preservative-free) Injectable 20 milliLiter(s) Local Injection once  LORazepam     Tablet 0.5 milliGRAM(s) Oral once  nicotine - 21 mG/24Hr(s) Patch 1 Patch Transdermal daily  polyethylene glycol 3350 17 Gram(s) Oral daily  senna 2 Tablet(s) Oral at bedtime  tamsulosin 0.4 milliGRAM(s) Oral two times a day  traZODone 150 milliGRAM(s) Oral at bedtime    MEDICATIONS  (PRN):  acetaminophen     Tablet .. 650 milliGRAM(s) Oral every 6 hours PRN Temp greater or equal to 38.5C (101.3F), Mild Pain (1 - 3), Moderate Pain (4 - 6)  ALBUTerol    90 MICROgram(s) HFA Inhaler 2 Puff(s) Inhalation every 6 hours PRN Shortness of Breath and/or Wheezing  dextrose Oral Gel 15 Gram(s) Oral once PRN Blood Glucose LESS THAN 70 milliGRAM(s)/deciliter  guaiFENesin Oral Liquid (Sugar-Free) 100 milliGRAM(s) Oral every 6 hours PRN Cough  HYDROmorphone   Tablet 4 milliGRAM(s) Oral every 3 hours PRN Moderate Pain (4 - 6)  HYDROmorphone  Injectable 1 milliGRAM(s) IV Push every 3 hours PRN Severe Pain (7 - 10)      CAPILLARY BLOOD GLUCOSE      POCT Blood Glucose.: 166 mg/dL (14 Oct 2022 22:36)  POCT Blood Glucose.: 352 mg/dL (14 Oct 2022 17:39)  POCT Blood Glucose.: 289 mg/dL (14 Oct 2022 12:05)  POCT Blood Glucose.: 195 mg/dL (14 Oct 2022 08:31)    I&O's Summary    14 Oct 2022 07:01  -  15 Oct 2022 07:00  --------------------------------------------------------  IN: 0 mL / OUT: 800 mL / NET: -800 mL        VITALS:   T(C): 36.4 (10-15-22 @ 06:26), Max: 36.8 (10-14-22 @ 14:23)  HR: 69 (10-15-22 @ 06:26) (69 - 80)  BP: 129/87 (10-15-22 @ 06:26) (114/68 - 129/87)  RR: 16 (10-15-22 @ 06:26) (16 - 18)  SpO2: 95% (10-15-22 @ 06:26) (95% - 99%)    GENERAL: NAD, lying in bed comfortably  HEAD:  Atraumatic, normocephalic  EYES: EOMI, PERRLA, conjunctiva and sclera clear  ENT: Moist mucous membranes  NECK: Supple, no JVD  HEART: Regular rate and rhythm, no murmurs, rubs, or gallops  LUNGS: Unlabored respirations.  Clear to auscultation bilaterally, no crackles, wheezing, or rhonchi  ABDOMEN: Soft, nontender, nondistended, +BS  EXTREMITIES: 2+ peripheral pulses bilaterally. No clubbing, cyanosis, or edema  NERVOUS SYSTEM:  A&Ox3, no focal deficits   SKIN: No rashes or lesions    LABS:                        14.1   8.49  )-----------( 142      ( 14 Oct 2022 05:48 )             43.2     10-14    132<L>  |  96<L>  |  32<H>  ----------------------------<  228<H>  5.2   |  25  |  1.27    Ca    9.5      14 Oct 2022 05:48  Phos  4.1     10-14  Mg     1.60     10-14    TPro  8.4<H>  /  Alb  3.1<L>  /  TBili  0.4  /  DBili  x   /  AST  16  /  ALT  16  /  AlkPhos  61  10-14                RADIOLOGY & ADDITIONAL TESTS:  Results Reviewed:   Imaging Personally Reviewed:  Electrocardiogram Personally Reviewed:    COORDINATION OF CARE:  Care Discussed with Consultants/Other Providers [Y/N]:  Prior or Outpatient Records Reviewed [Y/N]:

## 2022-10-15 NOTE — DIETITIAN INITIAL EVALUATION ADULT - PROBLEM/PLAN-3
From: Guillermina Ayala  To: Luz Marina Sejal  Sent: 12/20/2020 4:47 PM CST  Subject: Other    This is a P.S. to the previous message updating you on Cymbalta withdrawal.    I there any help for people like me who can't tolerate antidepressant medications? What other kind of therapies are are available. I am so discouraged and disgusted with the ability to find answers for myself. I never want to have to withdraw from a medication like this again. What help is there for me?     Anthony Acosta DISPLAY PLAN FREE TEXT

## 2022-10-15 NOTE — PROGRESS NOTE ADULT - PROBLEM SELECTOR PLAN 1
#Lytic lesions on the bones  BM Bx; IR consult if BM is negative  Decadron 4q6 after BM Bx; would likely need Bactrim PPX  SPEP; kappa/lambda elevated light chain ratio    #Concern for multiple myeloma (LARISA, Lytic bone lesions, mildly elevated calcium)  Appreciate H/O Recs BM Bx today: Labs: Immunofixation, B2 Microglobulin, SPEP, UPEP/VERONICA, Free Light Chains, Immunoglobulin panel    BM Biopsy Tomorrow #Lytic lesions on the bones  BM Bx; IR consult if BM is negative  Decadron 4q6 after BM Bx; would likely need Bactrim PPX  SPEP; kappa/lambda elevated light chain ratio    #Concern for multiple myeloma (LARISA, Lytic bone lesions, mildly elevated calcium)  Appreciate H/O Recs BM Bx 10/13: Labs: Immunofixation, B2 Microglobulin, SPEP, UPEP/VERONICA, Free Light Chains, Immunoglobulin panel

## 2022-10-15 NOTE — DIETITIAN INITIAL EVALUATION ADULT - OTHER INFO
68 y/o male with known spinal lesions as well as hx of CAD (PCIx3), COPD is here today for 5 days of progressively worsening back pain was found to have lytic lesions concerning for multiple myeloma awaiting further MRI imaging for further characterization as well as biopsy result. Pt said he is eating fairly well. He denies food allergies, nausea/vomiting/diarrhea/constipation, or issues with chewing/swallowing; last BM 10/12. He reported that his appetite fluctuates due to his ongoing back pain with some meals mostly consumed and others minimally eaten. He was not aware that he had weight loss over the past few months. Review of [facility] records indicated pt had a recorded weight of 74.8 kg on 7/19/22 with current dosing weight of 66.5 kg (146 lbs). Pt had a clinically significant weight loss of 12% over the past 3 months. Noted weight loss with fluctuating oral intake present pt at severe risk for malnutrition. Obtained food preferences and offered Glucerna Therapeutic Nutrition Shake 240mls 2x daily (440kcals, 20g protein) to optimize nutrition and assist with weight stabilization. Encourage and monitor oral intake, especially of nutrition supplement. Suggested consumption of small, frequent meals eaten throughout the day. RDN services to remain available as needed.

## 2022-10-15 NOTE — DIETITIAN INITIAL EVALUATION ADULT - NS FNS DIET ORDER
Diet, Consistent Carbohydrate/No Snacks:   DASH/TLC {Sodium & Cholesterol Restricted} (DASH) (10-12-22 @ 12:02)

## 2022-10-15 NOTE — DIETITIAN INITIAL EVALUATION ADULT - PERTINENT MEDS FT
MEDICATIONS  (STANDING):  albuterol/ipratropium for Nebulization 3 milliLiter(s) Nebulizer every 12 hours  allopurinol 100 milliGRAM(s) Oral daily  aspirin  chewable 81 milliGRAM(s) Oral daily  atorvastatin 80 milliGRAM(s) Oral at bedtime  bisacodyl 5 milliGRAM(s) Oral at bedtime  budesonide 160 MICROgram(s)/formoterol 4.5 MICROgram(s) Inhaler 2 Puff(s) Inhalation two times a day  buPROPion XL (24-Hour) . 150 milliGRAM(s) Oral daily  dexAMETHasone     Tablet 4 milliGRAM(s) Oral every 6 hours  dextrose 5%. 1000 milliLiter(s) (50 mL/Hr) IV Continuous <Continuous>  dextrose 5%. 1000 milliLiter(s) (100 mL/Hr) IV Continuous <Continuous>  dextrose 50% Injectable 25 Gram(s) IV Push once  dextrose 50% Injectable 12.5 Gram(s) IV Push once  dextrose 50% Injectable 25 Gram(s) IV Push once  enoxaparin Injectable 40 milliGRAM(s) SubCutaneous every 24 hours  fluticasone propionate 50 MICROgram(s)/spray Nasal Spray 1 Spray(s) Both Nostrils two times a day  gabapentin 300 milliGRAM(s) Oral at bedtime  glucagon  Injectable 1 milliGRAM(s) IntraMuscular once  influenza  Vaccine (HIGH DOSE) 0.7 milliLiter(s) IntraMuscular once  insulin glargine Injectable (LANTUS) 8 Unit(s) SubCutaneous at bedtime  insulin lispro (ADMELOG) corrective regimen sliding scale   SubCutaneous three times a day before meals  insulin lispro (ADMELOG) corrective regimen sliding scale   SubCutaneous at bedtime  insulin lispro Injectable (ADMELOG) 6 Unit(s) SubCutaneous three times a day before meals  levothyroxine 125 MICROGram(s) Oral daily  lidocaine   4% Patch 1 Patch Transdermal daily  lidocaine 2% (Preservative-free) Injectable 20 milliLiter(s) Local Injection once  LORazepam     Tablet 0.5 milliGRAM(s) Oral once  nicotine - 21 mG/24Hr(s) Patch 1 Patch Transdermal daily  polyethylene glycol 3350 17 Gram(s) Oral daily  senna 2 Tablet(s) Oral at bedtime  tamsulosin 0.4 milliGRAM(s) Oral two times a day  traZODone 150 milliGRAM(s) Oral at bedtime    MEDICATIONS  (PRN):  acetaminophen     Tablet .. 650 milliGRAM(s) Oral every 6 hours PRN Temp greater or equal to 38.5C (101.3F), Mild Pain (1 - 3), Moderate Pain (4 - 6)  ALBUTerol    90 MICROgram(s) HFA Inhaler 2 Puff(s) Inhalation every 6 hours PRN Shortness of Breath and/or Wheezing  dextrose Oral Gel 15 Gram(s) Oral once PRN Blood Glucose LESS THAN 70 milliGRAM(s)/deciliter  guaiFENesin Oral Liquid (Sugar-Free) 100 milliGRAM(s) Oral every 6 hours PRN Cough  HYDROmorphone   Tablet 4 milliGRAM(s) Oral every 3 hours PRN Moderate Pain (4 - 6)  HYDROmorphone  Injectable 1 milliGRAM(s) IV Push every 3 hours PRN Severe Pain (7 - 10)

## 2022-10-15 NOTE — DIETITIAN INITIAL EVALUATION ADULT - PERTINENT LABORATORY DATA
10-15    130<L>  |  94<L>  |  43<H>  ----------------------------<  217<H>  4.9   |  22  |  1.41<H>    Ca    9.6      15 Oct 2022 05:59  Phos  4.4     10-15  Mg     1.80     10-15    TPro  8.6<H>  /  Alb  3.3  /  TBili  0.4  /  DBili  x   /  AST  18  /  ALT  16  /  AlkPhos  58  10-15  POCT Blood Glucose.: 210 mg/dL (10-15-22 @ 12:05)  A1C with Estimated Average Glucose Result: 6.5 % (10-15-22 @ 05:59)  A1C with Estimated Average Glucose Result: 6.4 % (07-19-22 @ 04:01)  A1C with Estimated Average Glucose Result: 6.4 % (07-19-22 @ 00:22)

## 2022-10-15 NOTE — DIETITIAN NUTRITION RISK NOTIFICATION - TREATMENT: THE FOLLOWING DIET HAS BEEN RECOMMENDED
Diet, Consistent Carbohydrate/No Snacks:   DASH/TLC {Sodium & Cholesterol Restricted} (DASH) (10-12-22 @ 12:02) [Active]

## 2022-10-15 NOTE — PROGRESS NOTE ADULT - ASSESSMENT
67 YOM with known spinal lesions as well as hx of CAD (PCIx3), COPD is here today for 5 days of progressively worsening back pain was found to have lytic lesions concerning for multiple myeloma awaiting further MRI imaging for further characterization as well as biopsy results.

## 2022-10-16 NOTE — PROGRESS NOTE ADULT - SUBJECTIVE AND OBJECTIVE BOX
INCOMPLETE NOTE    Jesus Vázquez | PGY1| Pager: [phone]  Interval Events:    REVIEW OF SYSTEMS:  CONSTITUTIONAL: No weakness, fevers or chills  EYES/ENT: No visual changes;  No vertigo or throat pain   NECK: No pain or stiffness  RESPIRATORY: No cough, wheezing, hemoptysis; No shortness of breath  CARDIOVASCULAR: No chest pain or palpitations  GASTROINTESTINAL: No abdominal or epigastric pain. No nausea, vomiting, or hematemesis; No diarrhea or constipation. No melena or hematochezia.  GENITOURINARY: No dysuria, frequency or hematuria  NEUROLOGICAL: No numbness or weakness  SKIN: No itching, burning, rashes, or lesions   All other review of systems is negative unless indicated above.    OBJECTIVE:  ICU Vital Signs Last 24 Hrs  T(C): 36.8 (16 Oct 2022 05:21), Max: 36.9 (15 Oct 2022 21:01)  T(F): 98.2 (16 Oct 2022 05:21), Max: 98.4 (15 Oct 2022 21:01)  HR: 63 (16 Oct 2022 05:21) (63 - 79)  BP: 119/62 (16 Oct 2022 05:21) (119/62 - 143/93)  BP(mean): --  ABP: --  ABP(mean): --  RR: 18 (16 Oct 2022 05:21) (17 - 18)  SpO2: 97% (16 Oct 2022 05:21) (94% - 97%)    O2 Parameters below as of 16 Oct 2022 05:21  Patient On (Oxygen Delivery Method): room air              10-14 @ 07:01  -  10-15 @ 07:00  --------------------------------------------------------  IN: 0 mL / OUT: 800 mL / NET: -800 mL    10-15 @ 07:01  -  10-16 @ 06:56  --------------------------------------------------------  IN: 300 mL / OUT: 1400 mL / NET: -1100 mL      CAPILLARY BLOOD GLUCOSE      POCT Blood Glucose.: 204 mg/dL (15 Oct 2022 21:33)      PHYSICAL EXAM:  General: WN/WD NAD  Neurology: A&Ox3, nonfocal, FOWLER x 4  Eyes: PERRLA/ EOMI, Gross vision intact  ENT/Neck: Neck supple, trachea midline, No JVD, Gross hearing intact  Respiratory: CTA B/L, No wheezing, rales, rhonchi  CV: RRR, +S1/S2, -S3/S4, no murmurs, rubs or gallops  Abdominal: Soft, NT, ND +BS, No HSM  MSK: 5/5 strength UE/LE bilaterally  Extremities: No edema, 2+ peripheral pulses  Skin: No Rashes, Hematoma, Ecchymosis  Incisions:   Tubes:    HOSPITAL MEDICATIONS:  MEDICATIONS  (STANDING):  albuterol/ipratropium for Nebulization 3 milliLiter(s) Nebulizer every 12 hours  allopurinol 100 milliGRAM(s) Oral daily  aspirin  chewable 81 milliGRAM(s) Oral daily  atorvastatin 80 milliGRAM(s) Oral at bedtime  bisacodyl 5 milliGRAM(s) Oral at bedtime  budesonide 160 MICROgram(s)/formoterol 4.5 MICROgram(s) Inhaler 2 Puff(s) Inhalation two times a day  buPROPion XL (24-Hour) . 150 milliGRAM(s) Oral daily  dexAMETHasone     Tablet 4 milliGRAM(s) Oral every 6 hours  dextrose 5%. 1000 milliLiter(s) (50 mL/Hr) IV Continuous <Continuous>  dextrose 5%. 1000 milliLiter(s) (100 mL/Hr) IV Continuous <Continuous>  dextrose 50% Injectable 25 Gram(s) IV Push once  dextrose 50% Injectable 12.5 Gram(s) IV Push once  dextrose 50% Injectable 25 Gram(s) IV Push once  enoxaparin Injectable 40 milliGRAM(s) SubCutaneous every 24 hours  fluticasone propionate 50 MICROgram(s)/spray Nasal Spray 1 Spray(s) Both Nostrils two times a day  gabapentin 300 milliGRAM(s) Oral at bedtime  glucagon  Injectable 1 milliGRAM(s) IntraMuscular once  influenza  Vaccine (HIGH DOSE) 0.7 milliLiter(s) IntraMuscular once  insulin glargine Injectable (LANTUS) 10 Unit(s) SubCutaneous at bedtime  insulin lispro (ADMELOG) corrective regimen sliding scale   SubCutaneous three times a day before meals  insulin lispro (ADMELOG) corrective regimen sliding scale   SubCutaneous at bedtime  insulin lispro Injectable (ADMELOG) 8 Unit(s) SubCutaneous three times a day before meals  levothyroxine 125 MICROGram(s) Oral daily  lidocaine   4% Patch 1 Patch Transdermal daily  lidocaine 2% (Preservative-free) Injectable 20 milliLiter(s) Local Injection once  LORazepam     Tablet 0.5 milliGRAM(s) Oral once  nicotine - 21 mG/24Hr(s) Patch 1 Patch Transdermal daily  polyethylene glycol 3350 17 Gram(s) Oral daily  senna 2 Tablet(s) Oral at bedtime  tamsulosin 0.4 milliGRAM(s) Oral two times a day  traZODone 150 milliGRAM(s) Oral at bedtime    MEDICATIONS  (PRN):  acetaminophen     Tablet .. 650 milliGRAM(s) Oral every 6 hours PRN Temp greater or equal to 38.5C (101.3F), Mild Pain (1 - 3), Moderate Pain (4 - 6)  ALBUTerol    90 MICROgram(s) HFA Inhaler 2 Puff(s) Inhalation every 6 hours PRN Shortness of Breath and/or Wheezing  dextrose Oral Gel 15 Gram(s) Oral once PRN Blood Glucose LESS THAN 70 milliGRAM(s)/deciliter  guaiFENesin Oral Liquid (Sugar-Free) 100 milliGRAM(s) Oral every 6 hours PRN Cough  HYDROmorphone   Tablet 4 milliGRAM(s) Oral every 3 hours PRN Moderate Pain (4 - 6)  HYDROmorphone  Injectable 1 milliGRAM(s) IV Push every 3 hours PRN Severe Pain (7 - 10)      LABS:                        13.5   12.73 )-----------( 142      ( 15 Oct 2022 05:59 )             41.8     Hgb Trend: 13.5<--, 14.1<--, 14.5<--, 14.5<--  10-15    130<L>  |  94<L>  |  43<H>  ----------------------------<  217<H>  4.9   |  22  |  1.41<H>    Ca    9.6      15 Oct 2022 05:59  Phos  4.4     10-15  Mg     1.80     10-15    TPro  8.6<H>  /  Alb  3.3  /  TBili  0.4  /  DBili  x   /  AST  18  /  ALT  16  /  AlkPhos  58  10-15    Creatinine Trend: 1.41<--, 1.27<--, 1.32<--, 1.42<--            MICROBIOLOGY:      Jesus Kathie | PGY1| Pager: [phone]  Interval Events: Patient reports worsening difficulty with urination, has difficulty with urination at baseline, no red flag signs on exam    REVIEW OF SYSTEMS:  CONSTITUTIONAL: No weakness, fevers or chills  EYES/ENT: No visual changes;  NECK: No pain or stiffness  RESPIRATORY: Continued chronic cough  CARDIOVASCULAR: pleuritic chest pain with coughing  GASTROINTESTINAL: Generalized abdominal pain; tender to touch  GENITOURINARY: No dysuria, frequency or hematuria  NEUROLOGICAL: No numbness or weakness  SKIN: No itching, burning, rashes, or lesions       OBJECTIVE:  ICU Vital Signs Last 24 Hrs  T(C): 36.8 (16 Oct 2022 05:21), Max: 36.9 (15 Oct 2022 21:01)  T(F): 98.2 (16 Oct 2022 05:21), Max: 98.4 (15 Oct 2022 21:01)  HR: 63 (16 Oct 2022 05:21) (63 - 79)  BP: 119/62 (16 Oct 2022 05:21) (119/62 - 143/93)  BP(mean): --  ABP: --  ABP(mean): --  RR: 18 (16 Oct 2022 05:21) (17 - 18)  SpO2: 97% (16 Oct 2022 05:21) (94% - 97%)    O2 Parameters below as of 16 Oct 2022 05:21  Patient On (Oxygen Delivery Method): room air              10-14 @ 07:01  -  10-15 @ 07:00  --------------------------------------------------------  IN: 0 mL / OUT: 800 mL / NET: -800 mL    10-15 @ 07:01  -  10-16 @ 06:56  --------------------------------------------------------  IN: 300 mL / OUT: 1400 mL / NET: -1100 mL      CAPILLARY BLOOD GLUCOSE      POCT Blood Glucose.: 204 mg/dL (15 Oct 2022 21:33)      PHYSICAL EXAM:  General: Reports continued generalized back tenderness  Neurology: A&Ox3, FOWLER x 4  ENT/Neck: Neck supple, trachea midline, Gross hearing intact  Respiratory: CTA B/L, End expiratory wheezing at the bases bilaterally  CV: RRR, +S1/S2, -S3/S4  Abdominal: generally tender in all quadrants  MSK: 5/5 strength UE/LE bilaterally  Extremities: No edema, 2+ peripheral pulses  Skin: No Rashes, Hematoma, Ecchymosis      HOSPITAL MEDICATIONS:  MEDICATIONS  (STANDING):  albuterol/ipratropium for Nebulization 3 milliLiter(s) Nebulizer every 12 hours  allopurinol 100 milliGRAM(s) Oral daily  aspirin  chewable 81 milliGRAM(s) Oral daily  atorvastatin 80 milliGRAM(s) Oral at bedtime  bisacodyl 5 milliGRAM(s) Oral at bedtime  budesonide 160 MICROgram(s)/formoterol 4.5 MICROgram(s) Inhaler 2 Puff(s) Inhalation two times a day  buPROPion XL (24-Hour) . 150 milliGRAM(s) Oral daily  dexAMETHasone     Tablet 4 milliGRAM(s) Oral every 6 hours  dextrose 5%. 1000 milliLiter(s) (50 mL/Hr) IV Continuous <Continuous>  dextrose 5%. 1000 milliLiter(s) (100 mL/Hr) IV Continuous <Continuous>  dextrose 50% Injectable 25 Gram(s) IV Push once  dextrose 50% Injectable 12.5 Gram(s) IV Push once  dextrose 50% Injectable 25 Gram(s) IV Push once  enoxaparin Injectable 40 milliGRAM(s) SubCutaneous every 24 hours  fluticasone propionate 50 MICROgram(s)/spray Nasal Spray 1 Spray(s) Both Nostrils two times a day  gabapentin 300 milliGRAM(s) Oral at bedtime  glucagon  Injectable 1 milliGRAM(s) IntraMuscular once  influenza  Vaccine (HIGH DOSE) 0.7 milliLiter(s) IntraMuscular once  insulin glargine Injectable (LANTUS) 10 Unit(s) SubCutaneous at bedtime  insulin lispro (ADMELOG) corrective regimen sliding scale   SubCutaneous three times a day before meals  insulin lispro (ADMELOG) corrective regimen sliding scale   SubCutaneous at bedtime  insulin lispro Injectable (ADMELOG) 8 Unit(s) SubCutaneous three times a day before meals  levothyroxine 125 MICROGram(s) Oral daily  lidocaine   4% Patch 1 Patch Transdermal daily  lidocaine 2% (Preservative-free) Injectable 20 milliLiter(s) Local Injection once  LORazepam     Tablet 0.5 milliGRAM(s) Oral once  nicotine - 21 mG/24Hr(s) Patch 1 Patch Transdermal daily  polyethylene glycol 3350 17 Gram(s) Oral daily  senna 2 Tablet(s) Oral at bedtime  tamsulosin 0.4 milliGRAM(s) Oral two times a day  traZODone 150 milliGRAM(s) Oral at bedtime    MEDICATIONS  (PRN):  acetaminophen     Tablet .. 650 milliGRAM(s) Oral every 6 hours PRN Temp greater or equal to 38.5C (101.3F), Mild Pain (1 - 3), Moderate Pain (4 - 6)  ALBUTerol    90 MICROgram(s) HFA Inhaler 2 Puff(s) Inhalation every 6 hours PRN Shortness of Breath and/or Wheezing  dextrose Oral Gel 15 Gram(s) Oral once PRN Blood Glucose LESS THAN 70 milliGRAM(s)/deciliter  guaiFENesin Oral Liquid (Sugar-Free) 100 milliGRAM(s) Oral every 6 hours PRN Cough  HYDROmorphone   Tablet 4 milliGRAM(s) Oral every 3 hours PRN Moderate Pain (4 - 6)  HYDROmorphone  Injectable 1 milliGRAM(s) IV Push every 3 hours PRN Severe Pain (7 - 10)      LABS:                        13.5   12.73 )-----------( 142      ( 15 Oct 2022 05:59 )             41.8     Hgb Trend: 13.5<--, 14.1<--, 14.5<--, 14.5<--  10-15    130<L>  |  94<L>  |  43<H>  ----------------------------<  217<H>  4.9   |  22  |  1.41<H>    Ca    9.6      15 Oct 2022 05:59  Phos  4.4     10-15  Mg     1.80     10-15    TPro  8.6<H>  /  Alb  3.3  /  TBili  0.4  /  DBili  x   /  AST  18  /  ALT  16  /  AlkPhos  58  10-15    Creatinine Trend: 1.41<--, 1.27<--, 1.32<--, 1.42<--            MICROBIOLOGY:

## 2022-10-16 NOTE — PROGRESS NOTE ADULT - PROBLEM SELECTOR PLAN 2
8U Lantus; 6/6/6  Moderate Sliding Scale Na: 124  Glucose 248  Serum Osm: 303    Repeat BMP  Followup Triglyceride, cholesterol measurements Na: 124  Glucose 248  Serum Osm: 303  Triglyceride: 151    Repeat BMP

## 2022-10-16 NOTE — PROGRESS NOTE ADULT - PROBLEM SELECTOR PLAN 4
Continue Home Synthroid Duhai PRN 10U Lantus; 8/8/8  Moderate Sliding Scale    Steroid induced Hyperglycemia; 60/40 distribution for Basal/Bolus 16U Lantus; 10/10/10  Moderate Sliding Scale    Steroid induced Hyperglycemia; 60/40 distribution for Basal/Bolus

## 2022-10-16 NOTE — PROGRESS NOTE ADULT - PROBLEM SELECTOR PLAN 8
#Rule out ACS  EKG Shows no signs of new infarcts  Trops/CKMB Trending down DVT PPX: Lovenox 40mg (better for cancer patients)  Diet: Regular Diet  Dispo: A/P PT recs; return to Assisted living Home with PT services  Bowel Regiment: 2 Senna, PEG 17, Bisacodyl

## 2022-10-16 NOTE — CHART NOTE - NSCHARTNOTEFT_GEN_A_CORE
Initial MRI concerning for compressive disc fragment in addition to MM lesion at T5. Please urgently repeat MR THORACIC SPINE WITH AND WITHOUT CONTRAST to better evaluate lesion as it may be surgical. Please pre-medicate the patient as needed to tolerate the MRI.     Only need thoracic spine for eval, no need for MRI total spine.    Otherwise continue plan as previously documented, appreciate heme/onc and pain mgmt per medicine.

## 2022-10-16 NOTE — PROGRESS NOTE ADULT - PROBLEM SELECTOR PLAN 3
Duhai PRN 10U Lantus; 8/8/8  Moderate Sliding Scale Patient is on Flomax 0.4mg BID  prostate WNL on CT Scan  F/U PSA  F/U Bladder Scan

## 2022-10-16 NOTE — PROGRESS NOTE ADULT - PROBLEM SELECTOR PLAN 6
DVT PPX: Lovenox 40mg (better for cancer patients)  Diet: Regular Diet  Dispo: A/P PT recs; return to Assisted living Home with PT services  Bowel Regiment: 2 Senna, PEG 17, Bisacodyl Allopurinol 100mg daily Continue Home Synthroid

## 2022-10-16 NOTE — PROGRESS NOTE ADULT - PROBLEM SELECTOR PLAN 1
#Lytic lesions on the bones  BM Bx; IR consult if BM is negative  Decadron 4q6 after BM Bx; would likely need Bactrim PPX  SPEP; kappa/lambda elevated light chain ratio    #Concern for multiple myeloma (LARISA, Lytic bone lesions, mildly elevated calcium)  Appreciate H/O Recs BM Bx 10/13: Labs: Immunofixation, B2 Microglobulin, SPEP, UPEP/VERONICA, Free Light Chains, Immunoglobulin panel #Concern for multiple myeloma (LARISA, Lytic bone lesions, mildly elevated calcium)  Continue Monitoring for Red Flag Signs  Kappa/Lambda elevated  IR Consult if Results of BM Bx is negative  Decadron 4q6  BM Bx 10/13

## 2022-10-16 NOTE — PROGRESS NOTE ADULT - ASSESSMENT
67 YOM with known spinal lesions as well as hx of CAD (PCIx3), COPD is here today for 5 days of progressively worsening back pain was found to have lytic lesions concerning for multiple myeloma awaiting further MRI imaging for further characterization as well as biopsy results. 67 YOM with known spinal lesions as well as hx of CAD (PCIx3), COPD is here today for 5 days of progressively worsening back pain was found to have lytic lesions concerning for multiple myeloma awaiting further MRI imaging for further characterization as well as biopsy result. 67 YOM with known spinal lesions as well as hx of CAD (PCIx3), COPD is here today for 5 days of progressively worsening back pain was found to have lytic lesions concerning for multiple myeloma, now with hyponatremia.

## 2022-10-16 NOTE — PROGRESS NOTE ADULT - PROBLEM SELECTOR PLAN 7
#Rule out ACS  EKG Shows no signs of new infarcts  Trops/CKMB Trending down DVT PPX: Lovenox 40mg (better for cancer patients)  Diet: Regular Diet  Dispo: A/P PT recs; return to Assisted living Home with PT services  Bowel Regiment: 2 Senna, PEG 17, Bisacodyl Allopurinol 100mg daily

## 2022-10-17 NOTE — PROGRESS NOTE ADULT - PROBLEM SELECTOR PLAN 5
Pt is full code  Surrogate is pts sister - will complete HCP  Will continue to follow  Page for uncontrolled symptoms [phone].

## 2022-10-17 NOTE — PROGRESS NOTE ADULT - ATTENDING COMMENTS
Pt had a cardiac arrest and passed away on 10/18/22.
67M with a history of CAD s/p 3 stents, former smoker with 50-pack year history, CVA with left sided vision loss, COPD, GERD, gout, hypothyroidism, recently diagnosed abnormal spinal and rib lesions presenting with worsening mid-back pain for the past 5 days. Found to have possible cord compression of T5 as well as multiple lytic lesions throughout vertebrae and ribs, suspicious for multiple myeloma. S/p bone marrow biopsy on 10/13.    patient seen and examined at bedside. Laying in bed comfortably- reports back pain is still there and bothering him. Pain meds only help a little. Reports that he was unable to tolerate full MRI of the spine the day before due to back pain, amenable to trying again if pain is controlled and he is given a sedative.    -neurosurgery following, no surgical intervention for now, awaiting MRI of T-L-S spine  -depending on results of MRI, may need rad-onc eval  -hematology following, s/p bone marrow biopsy on 10/13  -c/w dexamethasone 4mg PO q6h (10/14--)  -f/u beta-2 microglobulin, immunofixation, IgG subsets, SPEP, UPEP  -will hold Plavix for now if patient requires any surgical intervention/biopsy  -pain control- Dilaudid 4mg PO q3h for moderate, 1mg IV q3h for severe pain  -bowel regimen  -PT eval recommended return to assisted living with skill PT when ready to dc    d/w HS 2
67M with a history of CAD s/p 3 stents, former smoker with 50-pack year history, CVA with left sided vision loss, COPD, GERD, gout, hypothyroidism, recently diagnosed abnormal spinal and rib lesions presenting with worsening mid-back pain for the past 5 days. Found to have possible cord compression of T5 as well as multiple lytic lesions throughout vertebrae and ribs, suspicious for multiple myeloma. S/p bone marrow biopsy on 10/13.    patient seen and examined at bedside. sitting up in bed eating lunch- reports back pain is still there and bothering him but he would like to try walking around more today. has no pain at site of bone marrow biopsy which was done yesterday. unable to tolerate full MRI of the spine yesterday due to back pain, amenable to trying again if pain is controlled and he is given a sedative.    -neurosurgery following, no surgical intervention for now, awaiting MRI of T-L-S spine  -depending on results of MRI, may need rad-onc eval  -hematology following, s/p bone marrow biopsy on 10/13  -will start dexamethasone 4mg PO q6h today  -check beta-2 microglobulin, immunofixation, IgG subsets, SPEP, UPEP  -will hold Plavix for now if patient requires any surgical intervention/biopsy  -pain control- d/c oxycodone, trial Dilaudid 2mg PO q4h for severe, 1mg PO q4h for moderate pain  -bowel regimen  -PT eval    d/w HS 2
67M with a history of CAD s/p 3 stents, former smoker with 50-pack year history, CVA with left sided vision loss, COPD, GERD, gout, hypothyroidism, recently diagnosed abnormal spinal and rib lesions presenting with worsening mid-back pain for the past 5 days. states he chronically has back pain but it has recently gotten worse. was lost to follow-up after visit at [facility] in June after he was told he needed a biopsy, appears patient was not sure what the next steps to do were for medical clearance for biopsy. he was recently hospitalized at [facility] a month ago where he was told that he likely has some kind of myeloma but he did not follow up with his oncologist. denies urinary/bowel incontinence, leg weakness, saddle anesthesia. also with intermittent chest tightness on the R and L side, not related to exertion.    patient seen and examined at bedside. reports still having severe pain his mid-back. states oxycodone does not help and only makes him drowsy- he has been taking Tylenol instead. he is amenable to trying different pain meds instead. also with chronic productive cough- states he thinks it may be due to his COPD.    -neurosurgery following, no surgical intervention for now, awaiting MRI of T-L-S spine  -depending on results of MRI, may need rad-onc eval  -hematology consulted- plan for BMB today  -will start dexamethasone 4mg PO q6h today  -check beta-2 microglobulin, immunofixation, IgG subsets, SPEP, UPEP  -will hold Plavix for now if patient requires any surgical intervention/biopsy  -pain control- d/c oxycodone, trial Dilaudid 2mg PO q4h for severe, 1mg PO q4h for moderate pain  -bowel regimen  -PT eval    d/w HS 2
67M with a history of CAD s/p 3 stents, former smoker with 50-pack year history, CVA with left sided vision loss, COPD, GERD, gout, hypothyroidism, recently diagnosed abnormal spinal and rib lesions presenting with worsening mid-back pain for the past 5 days. Found to have possible cord compression of T5 as well as multiple lytic lesions throughout vertebrae and ribs, suspicious for multiple myeloma. S/p bone marrow biopsy on 10/13.    patient seen and examined at bedside. Sitting up off the side of bed. reports back pain is still there and bothering him if he walks. Pain meds help a little. Reports that he was unable to tolerate full MRI of the spine the day before due to back pain, amenable to trying again if pain is controlled and he is given a sedative.    -neurosurgery following, no surgical intervention for now, awaiting MRI of T-L-S spine  -depending on results of MRI, may need rad-onc eval  -hematology following, s/p bone marrow biopsy on 10/13  -c/w dexamethasone 4mg PO q6h (10/14--)  -f/u beta-2 microglobulin, immunofixation, IgG subsets, SPEP, UPEP  -will hold Plavix for now if patient requires any surgical intervention/biopsy  -pain control- Dilaudid 4mg PO q3h for moderate, 1mg IV q3h for severe pain  -bowel regimen  -PT eval recommended return to assisted living with skill PT when ready to dc  -expedited MRI    d/w HS 2 .
67M with a history of CAD s/p 3 stents, former smoker with 50-pack year history, CVA with left sided vision loss, COPD, GERD, gout, hypothyroidism, recently diagnosed abnormal spinal and rib lesions presenting with worsening mid-back pain for the past 5 days. Found to have possible cord compression of T5 as well as multiple lytic lesions throughout vertebrae and ribs, suspicious for multiple myeloma. S/p bone marrow biopsy on 10/13.    patient seen and examined at bedside. sitting up at bedside. reports Dilaudid helps with his pain but wears off after 2-3 hours. woke up early this morning feeling like he had some pain in his right chest which resolved. still coughing. feels hesitant about any spine surgery as he thinks he will not be able to walk again afterward. also reports more difficulty in urinating the past few days    -MRI T spine with findings of T5 right ventral epidural disease compromises the ventral cord surface and is associated with cord edema and deformity  -neurosurgery following- will review imaging to decide on management  -rad-onc consulted  -hematology following, s/p bone marrow biopsy on 10/13- path results inconclusive but still high suspicion for multiple myeloma  -c/w dexamethasone 4mg PO q6h  -will hold Plavix for now if patient requires any surgical intervention/biopsy  -palliative team following, appreciate recs- c/w Dilaudid 4mg PO q4h for moderate pain, 1mg IV q4h for severe pain  -hyponatremia likely in setting of MM and SIADH- monitor for now  -hyperkalemia noted- given Lokelma and D50 and insulin, repeat BMP in afternoon  -bowel regimen  -PT eval    d/w HS 2

## 2022-10-17 NOTE — PROGRESS NOTE ADULT - PROBLEM SELECTOR PLAN 4
16U Lantus; 10/10/10  Moderate Sliding Scale    Steroid induced Hyperglycemia; 60/40 distribution for Basal/Bolus 23U Lantus; 11 Bolus  Moderate Sliding Scale    Steroid induced Hyperglycemia; 60/40 distribution for Basal/Bolus Patient is on Flomax 0.4mg BID  prostate WNL on CT Scan  PSA; normal    F/U Bladder Scan    Watch for acute worsening given signs of cord compression on imaging Na: 124  Glucose 248  Serum Osm: 303  Triglyceride: 151    Given Multiple Myeloma is likely; consider hyponatremia is 2/2 plasma cell dyscrasia

## 2022-10-17 NOTE — PROGRESS NOTE ADULT - PROBLEM SELECTOR PLAN 2
Worsening given medical findings  Already on Wellbutrin  Already on PRN bzd, can continue  Support provided.

## 2022-10-17 NOTE — PROGRESS NOTE ADULT - PROBLEM SELECTOR PLAN 1
Pain is manageable with IV/PO Dilaudid, wears off at 3-4hrs  Pending results and final recommendations from rad onc and neurosurgery  Depending on plan would consider starting long acting medication given extent of disease  Would recommend:  - Continue with high dose steroids   - Continue Dilaludid 4mg PO q3hrs PRN for moderate pain  - Continue Dilaudid 1mg IV q3hrs PRN for severe pain  - Increase Gabapentin 300mg QHS to BID   - Continue with bowel regimen to prevent opioid induced constipation.

## 2022-10-17 NOTE — PROGRESS NOTE ADULT - SUBJECTIVE AND OBJECTIVE BOX
SUBJECTIVE AND OBJECTIVE:  Pt indicates that PO and IV Dilaudid work for pain, effect lasts around 3-4hrs  Got MRI today, pending final recs from neurosurgery     Indication for Geriatrics and Palliative Care Services/INTERVAL HPI:  Symptom management and GOC    OVERNIGHT EVENTS:  Used 2x 1mg IV of Dilaudid and 3x 4mg PO Dilaudid    DNR on chart:  Allergies    No Known Allergies    Intolerances    MEDICATIONS  (STANDING):  albuterol/ipratropium for Nebulization 3 milliLiter(s) Nebulizer every 12 hours  allopurinol 100 milliGRAM(s) Oral daily  aspirin  chewable 81 milliGRAM(s) Oral daily  atorvastatin 80 milliGRAM(s) Oral at bedtime  bisacodyl 5 milliGRAM(s) Oral at bedtime  budesonide 160 MICROgram(s)/formoterol 4.5 MICROgram(s) Inhaler 2 Puff(s) Inhalation two times a day  buPROPion XL (24-Hour) . 150 milliGRAM(s) Oral daily  dexAMETHasone     Tablet 4 milliGRAM(s) Oral every 6 hours  dextrose 5%. 1000 milliLiter(s) (50 mL/Hr) IV Continuous <Continuous>  dextrose 5%. 1000 milliLiter(s) (100 mL/Hr) IV Continuous <Continuous>  dextrose 50% Injectable 25 Gram(s) IV Push once  dextrose 50% Injectable 12.5 Gram(s) IV Push once  dextrose 50% Injectable 25 Gram(s) IV Push once  enoxaparin Injectable 40 milliGRAM(s) SubCutaneous every 24 hours  fluticasone propionate 50 MICROgram(s)/spray Nasal Spray 1 Spray(s) Both Nostrils two times a day  gabapentin 300 milliGRAM(s) Oral at bedtime  glucagon  Injectable 1 milliGRAM(s) IntraMuscular once  influenza  Vaccine (HIGH DOSE) 0.7 milliLiter(s) IntraMuscular once  insulin glargine Injectable (LANTUS) 23 Unit(s) SubCutaneous at bedtime  insulin lispro (ADMELOG) corrective regimen sliding scale   SubCutaneous three times a day before meals  insulin lispro (ADMELOG) corrective regimen sliding scale   SubCutaneous at bedtime  insulin lispro Injectable (ADMELOG) 11 Unit(s) SubCutaneous three times a day before meals  levothyroxine 125 MICROGram(s) Oral daily  lidocaine   4% Patch 1 Patch Transdermal daily  lidocaine 2% (Preservative-free) Injectable 20 milliLiter(s) Local Injection once  LORazepam     Tablet 0.5 milliGRAM(s) Oral once  nicotine - 21 mG/24Hr(s) Patch 1 Patch Transdermal daily  polyethylene glycol 3350 17 Gram(s) Oral daily  senna 2 Tablet(s) Oral at bedtime  tamsulosin 0.4 milliGRAM(s) Oral two times a day  traZODone 150 milliGRAM(s) Oral at bedtime    MEDICATIONS  (PRN):  acetaminophen     Tablet .. 650 milliGRAM(s) Oral every 6 hours PRN Temp greater or equal to 38.5C (101.3F), Mild Pain (1 - 3), Moderate Pain (4 - 6)  ALBUTerol    90 MICROgram(s) HFA Inhaler 2 Puff(s) Inhalation every 6 hours PRN Shortness of Breath and/or Wheezing  dextrose Oral Gel 15 Gram(s) Oral once PRN Blood Glucose LESS THAN 70 milliGRAM(s)/deciliter  guaiFENesin Oral Liquid (Sugar-Free) 100 milliGRAM(s) Oral every 6 hours PRN Cough  HYDROmorphone   Tablet 4 milliGRAM(s) Oral every 3 hours PRN Moderate Pain (4 - 6)  HYDROmorphone  Injectable 1 milliGRAM(s) IV Push every 3 hours PRN Severe Pain (7 - 10)      ITEMS UNCHECKED ARE NOT PRESENT    PRESENT SYMPTOMS: [ ]Unable to self-report - see [ ] CPOT [ ] PAINADS [ ] RDOS  Source if other than patient:  [ ]Family   [ ]Team     Pain:  [ ]yes [ ]no  QOL impact -   Location -                    Aggravating factors -  Quality -  Radiation -  Timing-  Severity (0-10 scale):  Minimal acceptable level (0-10 scale):     CPOT:    https://www.sccm.org/getattachment/[email or web address]-7078k6337m6h/Critical-Care-Pain-Observation-Tool-(CPOT)    PAIN AD Score:	  [email or web address] (Ctrl + left click to view)    Dyspnea:                           [ ]Mild [ ]Moderate [ ]Severe    RDOS:  0 to 2  minimal or no respiratory distress   3  mild distress  4 to 6 moderate distress  >7 severe distress  [email or web address]://[email or web address]/hen/Respiratory_Distress_Observation_Scale.pdf (Ctrl +  left click to view)     Anxiety:                             [ ]Mild [ ]Moderate [ ]Severe  Fatigue:                             [ ]Mild [ ]Moderate [ ]Severe  Nausea:                             [ ]Mild [ ]Moderate [ ]Severe  Loss of appetite:              [ ]Mild [ ]Moderate [ ]Severe  Constipation:                    [ ]Mild [ ]Moderate [ ]Severe    PCSSQ[Palliative Care Spiritual Screening Question]   Severity (0-10):  Score of 4 or > indicate consideration of Chaplaincy referral.    Chaplaincy Referral: [ ] yes [ ] refused [ ] following    Other Symptoms:  [ ]All other review of systems negative     Palliative Performance Status Version 2:         %      http://npcrc.org/files/news/palliative_performance_scale_ppsv2.pdf    PHYSICAL EXAM:  Vital Signs Last 24 Hrs  T(C): 36.4 (17 Oct 2022 04:08), Max: 36.4 (16 Oct 2022 21:54)  T(F): 97.6 (17 Oct 2022 04:08), Max: 97.6 (17 Oct 2022 04:08)  HR: 85 (17 Oct 2022 13:37) (72 - 87)  BP: 120/75 (17 Oct 2022 13:37) (120/75 - 140/74)  BP(mean): --  RR: 17 (17 Oct 2022 13:37) (16 - 18)  SpO2: 96% (17 Oct 2022 13:37) (96% - 100%)    Parameters below as of 17 Oct 2022 13:37  Patient On (Oxygen Delivery Method): room air     I&O's Summary    16 Oct 2022 07:01  -  17 Oct 2022 07:00  --------------------------------------------------------  IN: 600 mL / OUT: 1450 mL / NET: -850 mL         GENERAL: [ ]Cachexia    [ ]Alert  [ ]Oriented x   [ ]Lethargic  [ ]Unarousable  [ ]Verbal  [ ]Non-Verbal  Behavioral:   [ ]Anxiety  [ ]Delirium [ ]Agitation [ ]Other  HEENT:  [ ]Normal   [ ]Dry mouth   [ ]ET Tube/Trach  [ ]Oral lesions  PULMONARY:   [ ]Clear [ ]Tachypnea  [ ]Audible excessive secretions   [ ]Rhonchi        [ ]Right [ ]Left [ ]Bilateral  [ ]Crackles        [ ]Right [ ]Left [ ]Bilateral  [ ]Wheezing     [ ]Right [ ]Left [ ]Bilateral  [ ]Diminished BS [ ] Right [ ]Left [ ]Bilateral  CARDIOVASCULAR:    [ ]Regular [ ]Irregular [ ]Tachy  [ ]Demond [ ]Murmur [ ]Other  GASTROINTESTINAL:  [ ]Soft  [ ]Distended   [ ]+BS  [ ]Non tender [ ]Tender  [ ]Other [ ]PEG [ ]OGT/ NGT   Last BM:   GENITOURINARY:  [ ]Normal [ ]Incontinent   [ ]Oliguria/Anuria   [ ]Hebert  MUSCULOSKELETAL:   [ ]Normal   [ ]Weakness  [ ]Bed/Wheelchair bound [ ]Edema  NEUROLOGIC:   [ ]No focal deficits  [ ] Cognitive impairment  [ ] Dysphagia [ ]Dysarthria [ ] Paresis [ ]Other   SKIN:   [ ]Normal  [ ]Rash  [ ]Other  [ ]Pressure ulcer(s) [ ]y [ ]n present on admission    CRITICAL CARE:  [ ]Shock Present  [ ]Septic [ ]Cardiogenic [ ]Neurologic [ ]Hypovolemic  [ ]Vasopressors [ ]Inotropes  [ ]Respiratory failure present [ ]Mechanical Ventilation [ ]Non-invasive ventilatory support [ ]High-Flow   [ ]Acute  [ ]Chronic [ ]Hypoxic  [ ]Hypercarbic [ ]Other  [ ]Other organ failure     LABS:                        13.8   11.14 )-----------( 142      ( 17 Oct 2022 04:45 )             41.8   10-17    128<L>  |  90<L>  |  45<H>  ----------------------------<  173<H>  5.9<H>   |  28  |  1.17    Ca    9.4      17 Oct 2022 04:45  Phos  3.7     10-17  Mg     1.80     10-17    TPro  8.7<H>  /  Alb  3.4  /  TBili  0.5  /  DBili  x   /  AST  22  /  ALT  19  /  AlkPhos  55  10-17          RADIOLOGY & ADDITIONAL STUDIES:    Protein Calorie Malnutrition Present: [ ]mild [ ]moderate [ ]severe [ ]underweight [ ]morbid obesity  https://www.andeal.org/vault/4472/web/files/ONC/Table_Clinical%20Characteristics%20to%20Document%20Malnutrition-White%20JV%20et%20al%731791.pdf    Height (cm): 160 (10-12-22 @ 14:15), 160 (07-19-22 @ 03:07), 154.99 (06-16-22 @ 13:00)  Weight (kg): 66.5 (10-12-22 @ 14:15), 74.843 (07-19-22 @ 03:07), [id number] (06-16-22 @ 13:00)  BMI (kg/m2): 26 (10-12-22 @ 14:15), 29.2 (07-19-22 @ 03:07), 29.4 (06-16-22 @ 13:00)    [ ]PPSV2 < or = 30%  [ ]significant weight loss [ ]poor nutritional intake [ ]anasarca[ ]Artificial Nutrition    Other REFERRALS:  [ ]Hospice  [ ]Child Life  [ ]Social Work  [ ]Case management [ ]Holistic Therapy     Goals of Care Document: SUBJECTIVE AND OBJECTIVE:  Pt indicates that PO and IV Dilaudid work for pain, effect lasts around 3-4hrs  Got MRI today, pending final recs from neurosurgery     Indication for Geriatrics and Palliative Care Services/INTERVAL HPI:  Symptom management and GOC    OVERNIGHT EVENTS:  Used 2x 1mg IV of Dilaudid and 3x 4mg PO Dilaudid    DNR on chart:  Allergies    No Known Allergies    Intolerances    MEDICATIONS  (STANDING):  albuterol/ipratropium for Nebulization 3 milliLiter(s) Nebulizer every 12 hours  allopurinol 100 milliGRAM(s) Oral daily  aspirin  chewable 81 milliGRAM(s) Oral daily  atorvastatin 80 milliGRAM(s) Oral at bedtime  bisacodyl 5 milliGRAM(s) Oral at bedtime  budesonide 160 MICROgram(s)/formoterol 4.5 MICROgram(s) Inhaler 2 Puff(s) Inhalation two times a day  buPROPion XL (24-Hour) . 150 milliGRAM(s) Oral daily  dexAMETHasone     Tablet 4 milliGRAM(s) Oral every 6 hours  dextrose 5%. 1000 milliLiter(s) (50 mL/Hr) IV Continuous <Continuous>  dextrose 5%. 1000 milliLiter(s) (100 mL/Hr) IV Continuous <Continuous>  dextrose 50% Injectable 25 Gram(s) IV Push once  dextrose 50% Injectable 12.5 Gram(s) IV Push once  dextrose 50% Injectable 25 Gram(s) IV Push once  enoxaparin Injectable 40 milliGRAM(s) SubCutaneous every 24 hours  fluticasone propionate 50 MICROgram(s)/spray Nasal Spray 1 Spray(s) Both Nostrils two times a day  gabapentin 300 milliGRAM(s) Oral at bedtime  glucagon  Injectable 1 milliGRAM(s) IntraMuscular once  influenza  Vaccine (HIGH DOSE) 0.7 milliLiter(s) IntraMuscular once  insulin glargine Injectable (LANTUS) 23 Unit(s) SubCutaneous at bedtime  insulin lispro (ADMELOG) corrective regimen sliding scale   SubCutaneous three times a day before meals  insulin lispro (ADMELOG) corrective regimen sliding scale   SubCutaneous at bedtime  insulin lispro Injectable (ADMELOG) 11 Unit(s) SubCutaneous three times a day before meals  levothyroxine 125 MICROGram(s) Oral daily  lidocaine   4% Patch 1 Patch Transdermal daily  lidocaine 2% (Preservative-free) Injectable 20 milliLiter(s) Local Injection once  LORazepam     Tablet 0.5 milliGRAM(s) Oral once  nicotine - 21 mG/24Hr(s) Patch 1 Patch Transdermal daily  polyethylene glycol 3350 17 Gram(s) Oral daily  senna 2 Tablet(s) Oral at bedtime  tamsulosin 0.4 milliGRAM(s) Oral two times a day  traZODone 150 milliGRAM(s) Oral at bedtime    MEDICATIONS  (PRN):  acetaminophen     Tablet .. 650 milliGRAM(s) Oral every 6 hours PRN Temp greater or equal to 38.5C (101.3F), Mild Pain (1 - 3), Moderate Pain (4 - 6)  ALBUTerol    90 MICROgram(s) HFA Inhaler 2 Puff(s) Inhalation every 6 hours PRN Shortness of Breath and/or Wheezing  dextrose Oral Gel 15 Gram(s) Oral once PRN Blood Glucose LESS THAN 70 milliGRAM(s)/deciliter  guaiFENesin Oral Liquid (Sugar-Free) 100 milliGRAM(s) Oral every 6 hours PRN Cough  HYDROmorphone   Tablet 4 milliGRAM(s) Oral every 3 hours PRN Moderate Pain (4 - 6)  HYDROmorphone  Injectable 1 milliGRAM(s) IV Push every 3 hours PRN Severe Pain (7 - 10)    ITEMS UNCHECKED ARE NOT PRESENT    PRESENT SYMPTOMS: [ ]Unable to self-report - see [ ] CPOT [ ] PAINADS [ ] RDOS  Source if other than patient:  [ ]Family   [ ]Team     Pain:  [x ]yes [ ]no  QOL impact - difficulty moving  Location - mid-lower back                   Aggravating factors - moving  Quality - aching, stabbing  Radiation - chest  Timing- constant  Severity (0-10 scale): 8/10  Minimal acceptable level (0-10 scale): 2-3/10    CPOT:    https://www.sccm.org/getattachment/mks99t57-0g8v-5h8o-5u1b-8719h0176g8m/Critical-Care-Pain-Observation-Tool-(CPOT)    PAIN AD Score:	  [email or web address] (Ctrl + left click to view)    Dyspnea:                           [ ]Mild [ ]Moderate [ ]Severe    RDOS:  0 to 2  minimal or no respiratory distress   3  mild distress  4 to 6 moderate distress  >7 severe distress  [email or web address]://[email or web address]_Distress_Observation_Scale.pdf (Ctrl +  left click to view)     Anxiety:                             [x ]Mild [ ]Moderate [ ]Severe  Fatigue:                             [ ]Mild [ ]Moderate [ ]Severe  Nausea:                             [ ]Mild [ ]Moderate [ ]Severe  Loss of appetite:              [ ]Mild [ ]Moderate [ ]Severe  Constipation:                    [ ]Mild [ ]Moderate [ ]Severe    PCSSQ[Palliative Care Spiritual Screening Question]   Severity (0-10):  Score of 4 or > indicate consideration of Chaplaincy referral.    Chaplaincy Referral: [ ] yes [ ] refused [ ] following    Other Symptoms:  [ x]All other review of systems negative     Palliative Performance Status Version 2:  50 %      http://npcrc.org/files/news/palliative_performance_scale_ppsv2.pdf    PHYSICAL EXAM:  Vital Signs Last 24 Hrs  T(C): 36.4 (17 Oct 2022 04:08), Max: 36.4 (16 Oct 2022 21:54)  T(F): 97.6 (17 Oct 2022 04:08), Max: 97.6 (17 Oct 2022 04:08)  HR: 85 (17 Oct 2022 13:37) (72 - 87)  BP: 120/75 (17 Oct 2022 13:37) (120/75 - 140/74)  BP(mean): --  RR: 17 (17 Oct 2022 13:37) (16 - 18)  SpO2: 96% (17 Oct 2022 13:37) (96% - 100%)    Parameters below as of 17 Oct 2022 13:37  Patient On (Oxygen Delivery Method): room air     I&O's Summary    16 Oct 2022 07:01  -  17 Oct 2022 07:00  --------------------------------------------------------  IN: 600 mL / OUT: 1450 mL / NET: -850 mL         GENERAL: [ ]Cachexia    [x ]Alert  [x ]Oriented x 4  [ ]Lethargic  [ ]Unarousable  [x ]Verbal  [ ]Non-Verbal  Behavioral:   [ ]Anxiety  [ ]Delirium [ ]Agitation [ ]Other  HEENT:  [ ]Normal   x[ ]Dry mouth   [ ]ET Tube/Trach  [ ]Oral lesions  PULMONARY:   [ x]Clear [ ]Tachypnea  [ ]Audible excessive secretions   [ ]Rhonchi        [ ]Right [ ]Left [ ]Bilateral  [ ]Crackles        [ ]Right [ ]Left [ ]Bilateral  [ ]Wheezing     [ ]Right [ ]Left [ ]Bilateral  [ ]Diminished BS [ ] Right [ ]Left [ ]Bilateral  CARDIOVASCULAR:    [ x]Regular [ ]Irregular [ ]Tachy  [ ]Demond [ ]Murmur [ ]Other  GASTROINTESTINAL:  [x ]Soft  [ ]Distended   [ x]+BS  [x ]Non tender [ ]Tender  [ ]Other [ ]PEG [ ]OGT/ NGT   Last BM:   GENITOURINARY:  [x ]Normal [ ]Incontinent   [ ]Oliguria/Anuria   [ ]Hebert  MUSCULOSKELETAL:   [x ]Normal   [ ]Weakness  [ ]Bed/Wheelchair bound [ ]Edema  NEUROLOGIC:   [x ]No focal deficits  [ ] Cognitive impairment  [ ] Dysphagia [ ]Dysarthria [ ] Paresis [ ]Other   SKIN:   [ x]Normal  [ ]Rash  [ ]Other  [ ]Pressure ulcer(s) [ ]y [ ]n present on admission    CRITICAL CARE:  [ ]Shock Present  [ ]Septic [ ]Cardiogenic [ ]Neurologic [ ]Hypovolemic  [ ]Vasopressors [ ]Inotropes  [ ]Respiratory failure present [ ]Mechanical Ventilation [ ]Non-invasive ventilatory support [ ]High-Flow   [ ]Acute  [ ]Chronic [ ]Hypoxic  [ ]Hypercarbic [ ]Other  [ ]Other organ failure     LABS:                        13.8   11.14 )-----------( 142      ( 17 Oct 2022 04:45 )             41.8   10-17    128<L>  |  90<L>  |  45<H>  ----------------------------<  173<H>  5.9<H>   |  28  |  1.17    Ca    9.4      17 Oct 2022 04:45  Phos  3.7     10-17  Mg     1.80     10-17    TPro  8.7<H>  /  Alb  3.4  /  TBili  0.5  /  DBili  x   /  AST  22  /  ALT  19  /  AlkPhos  55  10-17    RADIOLOGY & ADDITIONAL STUDIES:  < from: MR Thoracic Spine w/wo IV Cont (10.17.22 @ 11:05) >  IMPRESSION:  1. Diffuse osseous metastases.  T5 right ventral epidural disease   compromises the ventral cord surface and is associated with cord edema   and deformity.  Additional locations of more limited epidural disease   without direct cord compromise including T7, T8 and T10  2. Thoracic degenerative disc disease is most prominent at intervertebral   disc levels adjacent to the most prominent osseous metastases, including   T5-T6 left central disc protrusion and T10-T11 right central disc   protrusion (disc herniations) that cause ventral cord deformity    Protein Calorie Malnutrition Present: [ ]mild [ ]moderate [ ]severe [ ]underweight [ ]morbid obesity  https://www.andeal.org/vault/2440/web/files/ONC/Table_Clinical%20Characteristics%20to%20Document%20Malnutrition-White%20JV%20et%20al%068058.pdf    Height (cm): 160 (10-12-22 @ 14:15), 160 (07-19-22 @ 03:07), 154.99 (06-16-22 @ 13:00)  Weight (kg): 66.5 (10-12-22 @ 14:15), 74.843 (07-19-22 @ 03:07), 70.05288876509819 (06-16-22 @ 13:00)  BMI (kg/m2): 26 (10-12-22 @ 14:15), 29.2 (07-19-22 @ 03:07), 29.4 (06-16-22 @ 13:00)    [ ]PPSV2 < or = 30%  [ ]significant weight loss [ ]poor nutritional intake [ ]anasarca[ ]Artificial Nutrition    Other REFERRALS:  [ ]Hospice  [ ]Child Life  [ ]Social Work  [ ]Case management [ ]Holistic Therapy     Goals of Care Document:

## 2022-10-17 NOTE — PROGRESS NOTE ADULT - PROBLEM SELECTOR PLAN 5
Duhai PRN 23U Lantus; 11 Bolus  Moderate Sliding Scale    Steroid induced Hyperglycemia; 60/40 distribution for Basal/Bolus Patient is on Flomax 0.4mg BID  prostate WNL on CT Scan  PSA; normal    F/U Bladder Scan    Watch for acute worsening given signs of cord compression on imaging

## 2022-10-17 NOTE — PROGRESS NOTE ADULT - ASSESSMENT
67 YOM with known spinal lesions as well as hx of CAD (PCIx3), COPD is here today for 5 days of progressively worsening back pain was found to have lytic lesions concerning for multiple myeloma, now with hyponatremia.  67 YOM with known spinal lesions as well as hx of CAD (PCIx3), COPD is here today for 5 days of progressively worsening back pain was found to have lytic lesions concerning for multiple myeloma with MRI showing deformations of spinal cord; awaiting IR for biopsy of a lesion with sufficient yield

## 2022-10-17 NOTE — PROGRESS NOTE ADULT - PROBLEM SELECTOR PLAN 3
Patient is on Flomax 0.4mg BID  prostate WNL on CT Scan  F/U PSA  F/U Bladder Scan Na: 124  Glucose 248  Serum Osm: 303  Triglyceride: 151    Given Multiple Myeloma is likely; consider hyponatremia is 2/2 plasma cell dyscrasia Consider PE  Patient's LLE larger than RLE; however patient reports this has been chronic  Patient's HR is normal  O2 sat is good on RA, 2LNC given largely for comfort    F/U U/S Dopplers

## 2022-10-17 NOTE — PROGRESS NOTE ADULT - PROBLEM SELECTOR PLAN 6
Continue Home Synthroid Duhai PRN 23U Lantus; 11 Bolus  Moderate Sliding Scale    Steroid induced Hyperglycemia; 60/40 distribution for Basal/Bolus

## 2022-10-17 NOTE — PROGRESS NOTE ADULT - PROBLEM SELECTOR PLAN 2
Na: 124  Glucose 248  Serum Osm: 303  Triglyceride: 151    Repeat BMP Consider PE  Patient's LLE larger than RLE; however patient reports this has been chronic  Patient's HR is normal  O2 sat is good on RA, 2LNC given largely for comfort    F/U U/S Dopplers Patient Potassium 5.9 on AM Labs; unclear reason for acute elevated  Lokelma 10  Calcium gluconate 1g  Insulin 10U  0.5 of D50    Continue to monitor EKG

## 2022-10-17 NOTE — PROGRESS NOTE ADULT - ASSESSMENT
67M hx COPD, CAD s/p stents (remote) on ASA81 (off plavix) p/w worsening midthoracic back pain with previous w/u and re-confirmed labs consistent with multiple myeloma (has multiple osseous lesions on CT and elevated Cr). Heme/onc following s/p BMBx. MRI T-spine c/f cord compression at T5 with cord signal change 2/2 myleoma lesion from T5 pedicle as well as inferior disc fragment likely 2/2 degen from the pathologic bone. Will likely need surgical decompression/fusion prior to RT.    - D/w patient at bedside, will plan for OR for thoracic decompression/fusion pending med clearance  - Needs cardiology and pulmonology clearance for OR given stents and on NC 2/2 COPD  - will need medical vs ?renal optimization for OR given hyponatremia, last Na 125  - needs repeat Echo for clearance  - BLE duplex neg 10/17  - Cont decadron 4q6  - in OR, will be able to send bone and soft tissue sample for onc biopsy, no need for IR biopsy  - May need IR for pedicle marker prior to OR, will fu closer to OR date/after clearances   - Needs Rad Onc consult for postop RT planning   - Ok to cont ASA81 pending cards/pulm eval will need to hold 48h for OR,   - cont fu MM labs  - Pain control per primary    Reachable by [employer] Teams if any questions/concerns.

## 2022-10-17 NOTE — CHART NOTE - NSCHARTNOTEFT_GEN_A_CORE
67M known spinal lytic lesions concerning for multiple myeloma, lab work also concerning for multiple myeloma.     Heme onc performed Bone marrow biopsy 10/13/22 with sample deemed insufficient, now consulting for IR bone marrow biopsy.    plan for  above procedure Wed 10/19. can put order for IR procedure under Dr Easley  NPO AMN for sedation  please recheck CBC BMP Coags 4 AM  ensure covid swab within 5 days of procedure

## 2022-10-17 NOTE — PROGRESS NOTE ADULT - SUBJECTIVE AND OBJECTIVE BOX
Patient seen and examined at bedside.    --Anticoagulation--  enoxaparin Injectable 40 milliGRAM(s) SubCutaneous every 24 hours    T(C): 36.1 (10-17-22 @ 14:09), Max: 36.4 (10-16-22 @ 21:54)  HR: 85 (10-17-22 @ 13:37) (72 - 87)  BP: 120/75 (10-17-22 @ 13:37) (120/75 - 140/74)  RR: 17 (10-17-22 @ 13:37) (16 - 18)  SpO2: 96% (10-17-22 @ 13:37) (96% - 100%)  Wt(kg): --    Exam:  AOx3, PERRL, EOMI, no facial, FOWLER 5/5, no drift, c/o mid-thoracic radicular belt-like pain to chest c/w pathology, otherwise SILT, no overt hyperreflexia.     Labs:                        13.8   11.14 )-----------( 142      ( 17 Oct 2022 04:45 )             41.8     10-17    125<L>  |  90<L>  |  43<H>  ----------------------------<  279<H>  5.0   |  23  |  1.05    Ca    8.5      17 Oct 2022 17:40  Phos  3.2     10-17  Mg     1.70     10-17    TPro  8.7<H>  /  Alb  3.4  /  TBili  0.5  /  DBili  x   /  AST  22  /  ALT  19  /  AlkPhos  55  10-17

## 2022-10-17 NOTE — PROGRESS NOTE ADULT - SUBJECTIVE AND OBJECTIVE BOX
INCOMPLETE NOTE    Jesus Vázquez | PGY1| Pager: [phone]  Interval Events:    REVIEW OF SYSTEMS:  CONSTITUTIONAL: No weakness, fevers or chills  EYES/ENT: No visual changes;  No vertigo or throat pain   NECK: No pain or stiffness  RESPIRATORY: No cough, wheezing, hemoptysis; No shortness of breath  CARDIOVASCULAR: No chest pain or palpitations  GASTROINTESTINAL: No abdominal or epigastric pain. No nausea, vomiting, or hematemesis; No diarrhea or constipation. No melena or hematochezia.  GENITOURINARY: No dysuria, frequency or hematuria  NEUROLOGICAL: No numbness or weakness  SKIN: No itching, burning, rashes, or lesions   All other review of systems is negative unless indicated above.    OBJECTIVE:  ICU Vital Signs Last 24 Hrs  T(C): 36.4 (17 Oct 2022 04:08), Max: 36.5 (16 Oct 2022 12:20)  T(F): 97.6 (17 Oct 2022 04:08), Max: 97.7 (16 Oct 2022 12:20)  HR: 83 (17 Oct 2022 04:20) (70 - 87)  BP: 121/93 (17 Oct 2022 04:20) (118/69 - 140/74)  BP(mean): --  ABP: --  ABP(mean): --  RR: 16 (17 Oct 2022 04:20) (16 - 18)  SpO2: 97% (17 Oct 2022 04:20) (95% - 100%)    O2 Parameters below as of 17 Oct 2022 04:20  Patient On (Oxygen Delivery Method): room air              10-15 @ 07:01  -  10-16 @ 07:00  --------------------------------------------------------  IN: 300 mL / OUT: 1650 mL / NET: -1350 mL    10-16 @ 07:01  -  10-17 @ 06:39  --------------------------------------------------------  IN: 600 mL / OUT: 1450 mL / NET: -850 mL      CAPILLARY BLOOD GLUCOSE      POCT Blood Glucose.: 135 mg/dL (16 Oct 2022 21:16)      PHYSICAL EXAM:  General: WN/WD NAD  Neurology: A&Ox3, nonfocal, FOWLER x 4  Eyes: PERRLA/ EOMI, Gross vision intact  ENT/Neck: Neck supple, trachea midline, No JVD, Gross hearing intact  Respiratory: CTA B/L, No wheezing, rales, rhonchi  CV: RRR, +S1/S2, -S3/S4, no murmurs, rubs or gallops  Abdominal: Soft, NT, ND +BS, No HSM  MSK: 5/5 strength UE/LE bilaterally  Extremities: No edema, 2+ peripheral pulses  Skin: No Rashes, Hematoma, Ecchymosis  Incisions:   Tubes:    HOSPITAL MEDICATIONS:  MEDICATIONS  (STANDING):  albuterol/ipratropium for Nebulization 3 milliLiter(s) Nebulizer every 12 hours  allopurinol 100 milliGRAM(s) Oral daily  aspirin  chewable 81 milliGRAM(s) Oral daily  atorvastatin 80 milliGRAM(s) Oral at bedtime  bisacodyl 5 milliGRAM(s) Oral at bedtime  budesonide 160 MICROgram(s)/formoterol 4.5 MICROgram(s) Inhaler 2 Puff(s) Inhalation two times a day  buPROPion XL (24-Hour) . 150 milliGRAM(s) Oral daily  dexAMETHasone     Tablet 4 milliGRAM(s) Oral every 6 hours  dextrose 5%. 1000 milliLiter(s) (50 mL/Hr) IV Continuous <Continuous>  dextrose 5%. 1000 milliLiter(s) (100 mL/Hr) IV Continuous <Continuous>  dextrose 50% Injectable 25 Gram(s) IV Push once  dextrose 50% Injectable 12.5 Gram(s) IV Push once  dextrose 50% Injectable 25 Gram(s) IV Push once  enoxaparin Injectable 40 milliGRAM(s) SubCutaneous every 24 hours  fluticasone propionate 50 MICROgram(s)/spray Nasal Spray 1 Spray(s) Both Nostrils two times a day  gabapentin 300 milliGRAM(s) Oral at bedtime  glucagon  Injectable 1 milliGRAM(s) IntraMuscular once  influenza  Vaccine (HIGH DOSE) 0.7 milliLiter(s) IntraMuscular once  insulin glargine Injectable (LANTUS) 16 Unit(s) SubCutaneous at bedtime  insulin lispro (ADMELOG) corrective regimen sliding scale   SubCutaneous three times a day before meals  insulin lispro (ADMELOG) corrective regimen sliding scale   SubCutaneous at bedtime  insulin lispro Injectable (ADMELOG) 10 Unit(s) SubCutaneous three times a day before meals  levothyroxine 125 MICROGram(s) Oral daily  lidocaine   4% Patch 1 Patch Transdermal daily  lidocaine 2% (Preservative-free) Injectable 20 milliLiter(s) Local Injection once  LORazepam     Tablet 0.5 milliGRAM(s) Oral once  nicotine - 21 mG/24Hr(s) Patch 1 Patch Transdermal daily  polyethylene glycol 3350 17 Gram(s) Oral daily  senna 2 Tablet(s) Oral at bedtime  tamsulosin 0.4 milliGRAM(s) Oral two times a day  traZODone 150 milliGRAM(s) Oral at bedtime    MEDICATIONS  (PRN):  acetaminophen     Tablet .. 650 milliGRAM(s) Oral every 6 hours PRN Temp greater or equal to 38.5C (101.3F), Mild Pain (1 - 3), Moderate Pain (4 - 6)  ALBUTerol    90 MICROgram(s) HFA Inhaler 2 Puff(s) Inhalation every 6 hours PRN Shortness of Breath and/or Wheezing  dextrose Oral Gel 15 Gram(s) Oral once PRN Blood Glucose LESS THAN 70 milliGRAM(s)/deciliter  guaiFENesin Oral Liquid (Sugar-Free) 100 milliGRAM(s) Oral every 6 hours PRN Cough  HYDROmorphone   Tablet 4 milliGRAM(s) Oral every 3 hours PRN Moderate Pain (4 - 6)  HYDROmorphone  Injectable 1 milliGRAM(s) IV Push every 3 hours PRN Severe Pain (7 - 10)      LABS:                        13.8   11.14 )-----------( 142      ( 17 Oct 2022 04:45 )             41.8     Hgb Trend: 13.8<--, 13.5<--, 13.5<--, 14.1<--, 14.5<--  10-17    128<L>  |  90<L>  |  45<H>  ----------------------------<  173<H>  5.9<H>   |  28  |  1.17    Ca    9.4      17 Oct 2022 04:45  Phos  3.7     10-17  Mg     1.80     10-17    TPro  8.7<H>  /  Alb  3.4  /  TBili  0.5  /  DBili  x   /  AST  22  /  ALT  19  /  AlkPhos  55  10-17    Creatinine Trend: 1.17<--, 1.11<--, 1.24<--, 1.41<--, 1.27<--, 1.32<--            MICROBIOLOGY:      Jesus Vázquez | PGY1| Pager: [phone]  Interval Events: Patient reported back pain overnight, with concurrent chest pain; pain improved with IV dilaudid, but chest tightness persisted; EKG showed sinus tachycardia, with nonspecific twi, Morning labs showed elevated potassium; started on Lokelma 10, Calcium gluconate 1g, Insulin 10U, 0.5 of D50. continues to have difficulty with urination, takes a long time, strains    REVIEW OF SYSTEMS:  CONSTITUTIONAL: General weakness with continued back pain; continued difficulty with urination  EYES/ENT: No visual changes;  No vertigo or throat pain   RESPIRATORY: No cough, wheezing, hemoptysis; No shortness of breath  CARDIOVASCULAR: Chest Tightness; continued  GASTROINTESTINAL: Large BM yesterday  GENITOURINARY: Worsening difficulty with urination  NEUROLOGICAL: No numbness or weakness  All other review of systems is negative unless indicated above.    OBJECTIVE:  ICU Vital Signs Last 24 Hrs  T(C): 36.4 (17 Oct 2022 04:08), Max: 36.5 (16 Oct 2022 12:20)  T(F): 97.6 (17 Oct 2022 04:08), Max: 97.7 (16 Oct 2022 12:20)  HR: 83 (17 Oct 2022 04:20) (70 - 87)  BP: 121/93 (17 Oct 2022 04:20) (118/69 - 140/74)  BP(mean): --  ABP: --  ABP(mean): --  RR: 16 (17 Oct 2022 04:20) (16 - 18)  SpO2: 97% (17 Oct 2022 04:20) (95% - 100%)    O2 Parameters below as of 17 Oct 2022 04:20  Patient On (Oxygen Delivery Method): room air              10-15 @ 07:01  -  10-16 @ 07:00  --------------------------------------------------------  IN: 300 mL / OUT: 1650 mL / NET: -1350 mL    10-16 @ 07:01  -  10-17 @ 06:39  --------------------------------------------------------  IN: 600 mL / OUT: 1450 mL / NET: -850 mL      CAPILLARY BLOOD GLUCOSE      POCT Blood Glucose.: 135 mg/dL (16 Oct 2022 21:16)      PHYSICAL EXAM:  General: Patient reports back pain with general discomfort  Neurology: A&Ox3, FOWLER x 4  ENT/Neck: Neck supple, trachea midline, Gross hearing intact  Respiratory: Mild Bibasilar Wheezing  CV: RRR, +S1/S2, -S3/S4, no murmurs, rubs or gallops  Abdominal: Soft, NT, ND +BS, No HSM  Extremities: LLE larger than right  Skin: No Rashes, Hematoma, Ecchymosis    HOSPITAL MEDICATIONS:  MEDICATIONS  (STANDING):  albuterol/ipratropium for Nebulization 3 milliLiter(s) Nebulizer every 12 hours  allopurinol 100 milliGRAM(s) Oral daily  aspirin  chewable 81 milliGRAM(s) Oral daily  atorvastatin 80 milliGRAM(s) Oral at bedtime  bisacodyl 5 milliGRAM(s) Oral at bedtime  budesonide 160 MICROgram(s)/formoterol 4.5 MICROgram(s) Inhaler 2 Puff(s) Inhalation two times a day  buPROPion XL (24-Hour) . 150 milliGRAM(s) Oral daily  dexAMETHasone     Tablet 4 milliGRAM(s) Oral every 6 hours  dextrose 5%. 1000 milliLiter(s) (50 mL/Hr) IV Continuous <Continuous>  dextrose 5%. 1000 milliLiter(s) (100 mL/Hr) IV Continuous <Continuous>  dextrose 50% Injectable 25 Gram(s) IV Push once  dextrose 50% Injectable 12.5 Gram(s) IV Push once  dextrose 50% Injectable 25 Gram(s) IV Push once  enoxaparin Injectable 40 milliGRAM(s) SubCutaneous every 24 hours  fluticasone propionate 50 MICROgram(s)/spray Nasal Spray 1 Spray(s) Both Nostrils two times a day  gabapentin 300 milliGRAM(s) Oral at bedtime  glucagon  Injectable 1 milliGRAM(s) IntraMuscular once  influenza  Vaccine (HIGH DOSE) 0.7 milliLiter(s) IntraMuscular once  insulin glargine Injectable (LANTUS) 16 Unit(s) SubCutaneous at bedtime  insulin lispro (ADMELOG) corrective regimen sliding scale   SubCutaneous three times a day before meals  insulin lispro (ADMELOG) corrective regimen sliding scale   SubCutaneous at bedtime  insulin lispro Injectable (ADMELOG) 10 Unit(s) SubCutaneous three times a day before meals  levothyroxine 125 MICROGram(s) Oral daily  lidocaine   4% Patch 1 Patch Transdermal daily  lidocaine 2% (Preservative-free) Injectable 20 milliLiter(s) Local Injection once  LORazepam     Tablet 0.5 milliGRAM(s) Oral once  nicotine - 21 mG/24Hr(s) Patch 1 Patch Transdermal daily  polyethylene glycol 3350 17 Gram(s) Oral daily  senna 2 Tablet(s) Oral at bedtime  tamsulosin 0.4 milliGRAM(s) Oral two times a day  traZODone 150 milliGRAM(s) Oral at bedtime    MEDICATIONS  (PRN):  acetaminophen     Tablet .. 650 milliGRAM(s) Oral every 6 hours PRN Temp greater or equal to 38.5C (101.3F), Mild Pain (1 - 3), Moderate Pain (4 - 6)  ALBUTerol    90 MICROgram(s) HFA Inhaler 2 Puff(s) Inhalation every 6 hours PRN Shortness of Breath and/or Wheezing  dextrose Oral Gel 15 Gram(s) Oral once PRN Blood Glucose LESS THAN 70 milliGRAM(s)/deciliter  guaiFENesin Oral Liquid (Sugar-Free) 100 milliGRAM(s) Oral every 6 hours PRN Cough  HYDROmorphone   Tablet 4 milliGRAM(s) Oral every 3 hours PRN Moderate Pain (4 - 6)  HYDROmorphone  Injectable 1 milliGRAM(s) IV Push every 3 hours PRN Severe Pain (7 - 10)      LABS:                        13.8   11.14 )-----------( 142      ( 17 Oct 2022 04:45 )             41.8     Hgb Trend: 13.8<--, 13.5<--, 13.5<--, 14.1<--, 14.5<--  10-17    128<L>  |  90<L>  |  45<H>  ----------------------------<  173<H>  5.9<H>   |  28  |  1.17    Ca    9.4      17 Oct 2022 04:45  Phos  3.7     10-17  Mg     1.80     10-17    TPro  8.7<H>  /  Alb  3.4  /  TBili  0.5  /  DBili  x   /  AST  22  /  ALT  19  /  AlkPhos  55  10-17    Creatinine Trend: 1.17<--, 1.11<--, 1.24<--, 1.41<--, 1.27<--, 1.32<--            MICROBIOLOGY:

## 2022-10-17 NOTE — PROGRESS NOTE ADULT - PROBLEM SELECTOR PLAN 1
#Concern for multiple myeloma (LARISA, Lytic bone lesions, mildly elevated calcium)  Continue Monitoring for Red Flag Signs  Kappa/Lambda elevated  IR Consult if Results of BM Bx is negative  Decadron 4q6  BM Bx 10/13 #Concern for multiple myeloma (LARISA, Lytic bone lesions, mildly elevated calcium)  Continue Monitoring for Red Flag Signs  Kappa/Lambda elevated  IR Consult if Results of BM Bx is negative  Decadron 4q6  BM Bx 10/13    pain: 4mg PO Dilaudid q3h, 1mg IV #Concern for multiple myeloma (LARISA, Lytic bone lesions, mildly elevated calcium)  Continue Monitoring for Red Flag Signs  Kappa/Lambda elevated  Decadron 4q6  MRI shows signs of cord manipulation from lesions; T5-6 and T10-11  RadOnc is aware; re-consult when Biopsy Results  Appreciate NSGY Recs    IR Consulted for Bone Marrow Biopsy    pain: 4mg PO Dilaudid q3h, 1mg IV

## 2022-10-17 NOTE — PROGRESS NOTE ADULT - TIME BILLING
Extensive chart review and education to patient and family regarding plan of pain management and goal.  Adjustment and review of medications.

## 2022-10-17 NOTE — PROGRESS NOTE ADULT - PROBLEM SELECTOR PLAN 3
PPSV 50%  Needs assistance with most ADLs  Skin care  High risk of further decline given comorbidities.

## 2022-10-18 NOTE — PROGRESS NOTE ADULT - PROBLEM SELECTOR PLAN 2
Patient Potassium 5.9 on AM Labs; unclear reason for acute elevated  Lokelma 10  Calcium gluconate 1g  Insulin 10U  0.5 of D50    Continue to monitor EKG

## 2022-10-18 NOTE — CHART NOTE - NSCHARTNOTESELECT_GEN_ALL_CORE
MICU Accept Note/Event Note
Neurosurgery/Event Note
Neurosurgery/Event Note
Death Note/Event Note
IR
Neurosurgery/Event Note
Neurosurgery/Event Note

## 2022-10-18 NOTE — CHART NOTE - NSCHARTNOTEFT_GEN_A_CORE
MICU Accept Note    CHIEF COMPLAINT:     HPI / INTERVAL HISTORY:    PAST MEDICAL & SURGICAL HISTORY:  CAD (coronary artery disease)      Gout      Chronic obstructive pulmonary disease (COPD)      Hypothyroidism      GERD (gastroesophageal reflux disease)      No significant past surgical history      No significant past surgical history          FAMILY HISTORY:  Family hx of lung cancer (Mother)        SOCIAL HISTORY:  Smoking: [  ] Never Smoked  [  ] Former Smoker (# packs x # years)  [  ] Current Smoker (# packs x # years)  Substance Use:   EtOH Use:   Marital Status: [  ] Single  [  ]   [  ]   [  ]   Sexual History:   Occupation:  Recent Travel:  Country of Birth:   Advance Directives:     HOME MEDICATIONS:      Allergies    No Known Allergies    Intolerances          REVIEW OF SYSTEMS:  Constitutional: No fevers, chills, weight loss, weight gain  HEENT: No vision problems, eye pain, nasal congestion, rhinorrhea, sore throat, dysphagia  CV: No chest pain, orthopnea, palpitations  Resp: No cough, dyspnea, wheezing, hemoptysis  GI: No nausea, vomiting, diarrhea, constipation, abdominal pain  : [ ] dysuria [ ] nocturia [ ] hematuria [ ] increased urinary frequency  Musculoskeletal: [ ] back pain [ ] myalgias [ ] arthralgias [ ] fracture  Skin: [ ] rash [ ] itch  Neurological: [ ] headache [ ] dizziness [ ] syncope [ ] weakness [ ] numbness  Psychiatric: [ ] anxiety [ ] depression  Endocrine: [ ] diabetes [ ] thyroid problem  Hematologic/Lymphatic: [ ] anemia [ ] bleeding problem  Allergic/Immunologic: [ ] itchy eyes [ ] nasal discharge [ ] hives [ ] angioedema  [ ] All other systems negative  [ ] Unable to assess ROS because ________    OBJECTIVE:  ICU Vital Signs Last 24 Hrs  T(C): 36.4 (18 Oct 2022 05:48), Max: 36.7 (17 Oct 2022 21:28)  T(F): 97.6 (18 Oct 2022 05:48), Max: 98 (17 Oct 2022 21:28)  HR: 86 (18 Oct 2022 05:48) (85 - 98)  BP: 129/77 (18 Oct 2022 05:48) (110/84 - 129/77)  BP(mean): --  ABP: --  ABP(mean): --  RR: 18 (18 Oct 2022 05:48) (17 - 18)  SpO2: 96% (18 Oct 2022 05:48) (96% - 98%)    O2 Parameters below as of 18 Oct 2022 05:48  Patient On (Oxygen Delivery Method): room air              10-17 @ 07:01  -  10-18 @ 07:00  --------------------------------------------------------  IN: 350 mL / OUT: 1050 mL / NET: -700 mL      CAPILLARY BLOOD GLUCOSE      POCT Blood Glucose.: 277 mg/dL (18 Oct 2022 07:44)      PHYSICAL EXAM:  General:   HEENT:   Neck:   Chest/Lungs:  Heart:  Abdomen:   Extremities:   Skin:   Neuro:   Psych:     LINES:     HOSPITAL MEDICATIONS:  MEDICATIONS  (STANDING):  albuterol/ipratropium for Nebulization 3 milliLiter(s) Nebulizer every 12 hours  allopurinol 100 milliGRAM(s) Oral daily  aspirin  chewable 81 milliGRAM(s) Oral daily  atorvastatin 80 milliGRAM(s) Oral at bedtime  bisacodyl 5 milliGRAM(s) Oral at bedtime  budesonide 160 MICROgram(s)/formoterol 4.5 MICROgram(s) Inhaler 2 Puff(s) Inhalation two times a day  buPROPion XL (24-Hour) . 150 milliGRAM(s) Oral daily  dexAMETHasone     Tablet 4 milliGRAM(s) Oral every 6 hours  dextrose 5%. 1000 milliLiter(s) (100 mL/Hr) IV Continuous <Continuous>  dextrose 5%. 1000 milliLiter(s) (50 mL/Hr) IV Continuous <Continuous>  dextrose 50% Injectable 25 Gram(s) IV Push once  dextrose 50% Injectable 12.5 Gram(s) IV Push once  dextrose 50% Injectable 25 Gram(s) IV Push once  enoxaparin Injectable 40 milliGRAM(s) SubCutaneous every 24 hours  fluticasone propionate 50 MICROgram(s)/spray Nasal Spray 1 Spray(s) Both Nostrils two times a day  gabapentin 300 milliGRAM(s) Oral every 12 hours  glucagon  Injectable 1 milliGRAM(s) IntraMuscular once  influenza  Vaccine (HIGH DOSE) 0.7 milliLiter(s) IntraMuscular once  insulin glargine Injectable (LANTUS) 23 Unit(s) SubCutaneous at bedtime  insulin lispro (ADMELOG) corrective regimen sliding scale   SubCutaneous three times a day before meals  insulin lispro (ADMELOG) corrective regimen sliding scale   SubCutaneous at bedtime  insulin lispro Injectable (ADMELOG) 11 Unit(s) SubCutaneous three times a day before meals  levothyroxine 125 MICROGram(s) Oral daily  lidocaine   4% Patch 1 Patch Transdermal daily  lidocaine 2% (Preservative-free) Injectable 20 milliLiter(s) Local Injection once  LORazepam     Tablet 0.5 milliGRAM(s) Oral once  nicotine - 21 mG/24Hr(s) Patch 1 Patch Transdermal daily  polyethylene glycol 3350 17 Gram(s) Oral daily  senna 2 Tablet(s) Oral at bedtime  tamsulosin 0.4 milliGRAM(s) Oral two times a day  traZODone 150 milliGRAM(s) Oral at bedtime    MEDICATIONS  (PRN):  acetaminophen     Tablet .. 650 milliGRAM(s) Oral every 6 hours PRN Temp greater or equal to 38.5C (101.3F), Mild Pain (1 - 3), Moderate Pain (4 - 6)  ALBUTerol    90 MICROgram(s) HFA Inhaler 2 Puff(s) Inhalation every 6 hours PRN Shortness of Breath and/or Wheezing  aluminum hydroxide/magnesium hydroxide/simethicone Suspension 30 milliLiter(s) Oral every 4 hours PRN Dyspepsia  dextrose Oral Gel 15 Gram(s) Oral once PRN Blood Glucose LESS THAN 70 milliGRAM(s)/deciliter  guaiFENesin Oral Liquid (Sugar-Free) 100 milliGRAM(s) Oral every 6 hours PRN Cough  HYDROmorphone   Tablet 4 milliGRAM(s) Oral every 3 hours PRN Moderate Pain (4 - 6)  HYDROmorphone  Injectable 1 milliGRAM(s) IV Push every 3 hours PRN Severe Pain (7 - 10)      LABS:                        13.6   13.59 )-----------( 161      ( 18 Oct 2022 05:42 )             40.9     Hgb Trend: 13.6<--, 13.8<--, 13.5<--, 13.5<--, 14.1<--  10-18    129<L>  |  89<L>  |  37<H>  ----------------------------<  173<H>  5.3   |  28  |  1.12    Ca    9.2      18 Oct 2022 05:42  Phos  4.0     10-18  Mg     1.90     10-18    TPro  8.4<H>  /  Alb  3.4  /  TBili  0.6  /  DBili  x   /  AST  108<H>  /  ALT  60<H>  /  AlkPhos  58  10-18    Creatinine Trend: 1.12<--, 1.05<--, 1.17<--, 1.11<--, 1.24<--, 1.41<--            MICROBIOLOGY:     RADIOLOGY & ADDITIONAL TESTS:        Orquidea Gonzalez MD  Internal Medicine PGY1  Pager: [phone] ([facility]), [phone] ([facility]) MICU Accept Note    CHIEF COMPLAINT:     HPI / INTERVAL HISTORY: Patient is a 67 YOM with hx of CABG s/p PCIx3, 48 PY Smoking hx, stroke (25ya, w/ residual left sided facial weakness), COPD, GERD, gout, hypothyroidism here for 5 days of worsening radiating upper back pain. Patient has known lesions within the thoracic spine that was supposed to be biopsied several months ago, but was lost to followup. He is now admitted for further workup of back pain.    Hospital Course  Patient found to have expansion of thoracic lesions with signs concerning for cord compression, but lacked any red flag signs. Patient seen by NSGY team and assessed to have no need for acute intervention. Hematology consulted for high suspicion of MM with elevated kappa/lambda ratio; with BM Bx was performed on 10/13/22 which was insufficient. On 10/19 subsequent biopsy of thoracic lesions were performed.     RRT called today for AMS concern for in the setting of possible opioid or benzodiazepines vs. metabolic encephalopathy. The patient was given narcan and became incredibly agitated at which point he was given haldol. The patient then went into PEA arrest and ACLS protocol was initiated. At which time, ROSC was obtained however he went back into PEA arrest. ROSC obtained a second time. On a third occasion prior to transport patient underwent PEA arrest. ROSC achieved a third  time. Noted to be in wide complex tachycardia consistent with Vtach with a stable blood pressure albeit on pressers. The patient was cardioverted and loaded with amiodarone 150mg. His blood pressure and all vitals stabilized. He was transferred to the MICU.     PAST MEDICAL & SURGICAL HISTORY:  CAD (coronary artery disease)      Gout      Chronic obstructive pulmonary disease (COPD)      Hypothyroidism      GERD (gastroesophageal reflux disease)      No significant past surgical history      No significant past surgical history          FAMILY HISTORY:  Family hx of lung cancer (Mother)        SOCIAL HISTORY: Unknown   HOME MEDICATIONS: Reviewed       Allergies    No Known Allergies    Intolerances          REVIEW OF SYSTEMS: Unable to obtain     OBJECTIVE:  ICU Vital Signs Last 24 Hrs  T(C): 36.4 (18 Oct 2022 05:48), Max: 36.7 (17 Oct 2022 21:28)  T(F): 97.6 (18 Oct 2022 05:48), Max: 98 (17 Oct 2022 21:28)  HR: 86 (18 Oct 2022 05:48) (85 - 98)  BP: 129/77 (18 Oct 2022 05:48) (110/84 - 129/77)  BP(mean): --  ABP: --  ABP(mean): --  RR: 18 (18 Oct 2022 05:48) (17 - 18)  SpO2: 96% (18 Oct 2022 05:48) (96% - 98%)    O2 Parameters below as of 18 Oct 2022 05:48  Patient On (Oxygen Delivery Method): room air              10-17 @ 07:01  -  10-18 @ 07:00  --------------------------------------------------------  IN: 350 mL / OUT: 1050 mL / NET: -700 mL      CAPILLARY BLOOD GLUCOSE      POCT Blood Glucose.: 277 mg/dL (18 Oct 2022 07:44)      PHYSICAL EXAM:  General: lethargic,   HEENT:   Neck:   Chest/Lungs:  Heart:  Abdomen:   Extremities:   Skin:   Neuro:   Psych:     LINES:     HOSPITAL MEDICATIONS:  MEDICATIONS  (STANDING):  albuterol/ipratropium for Nebulization 3 milliLiter(s) Nebulizer every 12 hours  allopurinol 100 milliGRAM(s) Oral daily  aspirin  chewable 81 milliGRAM(s) Oral daily  atorvastatin 80 milliGRAM(s) Oral at bedtime  bisacodyl 5 milliGRAM(s) Oral at bedtime  budesonide 160 MICROgram(s)/formoterol 4.5 MICROgram(s) Inhaler 2 Puff(s) Inhalation two times a day  buPROPion XL (24-Hour) . 150 milliGRAM(s) Oral daily  dexAMETHasone     Tablet 4 milliGRAM(s) Oral every 6 hours  dextrose 5%. 1000 milliLiter(s) (100 mL/Hr) IV Continuous <Continuous>  dextrose 5%. 1000 milliLiter(s) (50 mL/Hr) IV Continuous <Continuous>  dextrose 50% Injectable 25 Gram(s) IV Push once  dextrose 50% Injectable 12.5 Gram(s) IV Push once  dextrose 50% Injectable 25 Gram(s) IV Push once  enoxaparin Injectable 40 milliGRAM(s) SubCutaneous every 24 hours  fluticasone propionate 50 MICROgram(s)/spray Nasal Spray 1 Spray(s) Both Nostrils two times a day  gabapentin 300 milliGRAM(s) Oral every 12 hours  glucagon  Injectable 1 milliGRAM(s) IntraMuscular once  influenza  Vaccine (HIGH DOSE) 0.7 milliLiter(s) IntraMuscular once  insulin glargine Injectable (LANTUS) 23 Unit(s) SubCutaneous at bedtime  insulin lispro (ADMELOG) corrective regimen sliding scale   SubCutaneous three times a day before meals  insulin lispro (ADMELOG) corrective regimen sliding scale   SubCutaneous at bedtime  insulin lispro Injectable (ADMELOG) 11 Unit(s) SubCutaneous three times a day before meals  levothyroxine 125 MICROGram(s) Oral daily  lidocaine   4% Patch 1 Patch Transdermal daily  lidocaine 2% (Preservative-free) Injectable 20 milliLiter(s) Local Injection once  LORazepam     Tablet 0.5 milliGRAM(s) Oral once  nicotine - 21 mG/24Hr(s) Patch 1 Patch Transdermal daily  polyethylene glycol 3350 17 Gram(s) Oral daily  senna 2 Tablet(s) Oral at bedtime  tamsulosin 0.4 milliGRAM(s) Oral two times a day  traZODone 150 milliGRAM(s) Oral at bedtime    MEDICATIONS  (PRN):  acetaminophen     Tablet .. 650 milliGRAM(s) Oral every 6 hours PRN Temp greater or equal to 38.5C (101.3F), Mild Pain (1 - 3), Moderate Pain (4 - 6)  ALBUTerol    90 MICROgram(s) HFA Inhaler 2 Puff(s) Inhalation every 6 hours PRN Shortness of Breath and/or Wheezing  aluminum hydroxide/magnesium hydroxide/simethicone Suspension 30 milliLiter(s) Oral every 4 hours PRN Dyspepsia  dextrose Oral Gel 15 Gram(s) Oral once PRN Blood Glucose LESS THAN 70 milliGRAM(s)/deciliter  guaiFENesin Oral Liquid (Sugar-Free) 100 milliGRAM(s) Oral every 6 hours PRN Cough  HYDROmorphone   Tablet 4 milliGRAM(s) Oral every 3 hours PRN Moderate Pain (4 - 6)  HYDROmorphone  Injectable 1 milliGRAM(s) IV Push every 3 hours PRN Severe Pain (7 - 10)      LABS:                        13.6   13.59 )-----------( 161      ( 18 Oct 2022 05:42 )             40.9     Hgb Trend: 13.6<--, 13.8<--, 13.5<--, 13.5<--, 14.1<--  10-18    129<L>  |  89<L>  |  37<H>  ----------------------------<  173<H>  5.3   |  28  |  1.12    Ca    9.2      18 Oct 2022 05:42  Phos  4.0     10-18  Mg     1.90     10-18    TPro  8.4<H>  /  Alb  3.4  /  TBili  0.6  /  DBili  x   /  AST  108<H>  /  ALT  60<H>  /  AlkPhos  58  10-18    Creatinine Trend: 1.12<--, 1.05<--, 1.17<--, 1.11<--, 1.24<--, 1.41<--            MICROBIOLOGY:     RADIOLOGY & ADDITIONAL TESTS:        Nick Potter MD   Internal Medicine PGY3 MICU Accept Note    CHIEF COMPLAINT:     HPI / INTERVAL HISTORY: Patient is a 67 YOM with hx of CABG s/p PCIx3, 48 PY Smoking hx, stroke (25ya, w/ residual left sided facial weakness), COPD, GERD, gout, hypothyroidism here for 5 days of worsening radiating upper back pain. Patient has known lesions within the thoracic spine that was supposed to be biopsied several months ago, but was lost to followup. He is now admitted for further workup of back pain.    Hospital Course  Patient found to have expansion of thoracic lesions with signs concerning for cord compression, but lacked any red flag signs. Patient seen by NSGY team and assessed to have no need for acute intervention. Hematology consulted for high suspicion of MM with elevated kappa/lambda ratio; with BM Bx was performed on 10/13/22 which was insufficient. On 10/19 subsequent biopsy of thoracic lesions were performed.     RRT called today for AMS concern for in the setting of possible opioid or benzodiazepines vs. metabolic encephalopathy. The patient was given narcan and became incredibly agitated at which point he was given haldol. The patient then went into PEA arrest and ACLS protocol was initiated. At which time, ROSC was obtained however he went back into PEA arrest. ROSC obtained a second time. On a third occasion prior to transport patient underwent PEA arrest. ROSC achieved a third  time. Noted to be in wide complex tachycardia consistent with Vtach with a stable blood pressure albeit on pressers. The patient was cardioverted and loaded with amiodarone 150mg. His blood pressure and all vitals stabilized. He was transferred to the MICU.     PAST MEDICAL & SURGICAL HISTORY:  CAD (coronary artery disease)      Gout      Chronic obstructive pulmonary disease (COPD)      Hypothyroidism      GERD (gastroesophageal reflux disease)      No significant past surgical history      No significant past surgical history          FAMILY HISTORY:  Family hx of lung cancer (Mother)        SOCIAL HISTORY: Unknown   HOME MEDICATIONS: Reviewed       Allergies    No Known Allergies    Intolerances          REVIEW OF SYSTEMS: Unable to obtain     OBJECTIVE:  ICU Vital Signs Last 24 Hrs  T(C): 36.4 (18 Oct 2022 05:48), Max: 36.7 (17 Oct 2022 21:28)  T(F): 97.6 (18 Oct 2022 05:48), Max: 98 (17 Oct 2022 21:28)  HR: 86 (18 Oct 2022 05:48) (85 - 98)  BP: 129/77 (18 Oct 2022 05:48) (110/84 - 129/77)  BP(mean): --  ABP: --  ABP(mean): --  RR: 18 (18 Oct 2022 05:48) (17 - 18)  SpO2: 96% (18 Oct 2022 05:48) (96% - 98%)    O2 Parameters below as of 18 Oct 2022 05:48  Patient On (Oxygen Delivery Method): room air              10-17 @ 07:01  -  10-18 @ 07:00  --------------------------------------------------------  IN: 350 mL / OUT: 1050 mL / NET: -700 mL      CAPILLARY BLOOD GLUCOSE      POCT Blood Glucose.: 277 mg/dL (18 Oct 2022 07:44)      PHYSICAL EXAM:  General: lethargic, intubated   HEENT: No evidence of airway compromise, ET tube in place   Neck: Supple  Chest/Lungs: CTA bilaterally without wheezing, rales or rhonchi   Heart: Normal s1 and s2 w/o murmur, rub or gallop, RRR   Abdomen: Soft, NT, ND   Extremities: Normal temperature, no diaphoresis   Skin: No cyanosis or erythema   Neuro: AOX 0    LINES: IO on left leg and IV on right forearm     HOSPITAL MEDICATIONS:  MEDICATIONS  (STANDING):  albuterol/ipratropium for Nebulization 3 milliLiter(s) Nebulizer every 12 hours  allopurinol 100 milliGRAM(s) Oral daily  aspirin  chewable 81 milliGRAM(s) Oral daily  atorvastatin 80 milliGRAM(s) Oral at bedtime  bisacodyl 5 milliGRAM(s) Oral at bedtime  budesonide 160 MICROgram(s)/formoterol 4.5 MICROgram(s) Inhaler 2 Puff(s) Inhalation two times a day  buPROPion XL (24-Hour) . 150 milliGRAM(s) Oral daily  dexAMETHasone     Tablet 4 milliGRAM(s) Oral every 6 hours  dextrose 5%. 1000 milliLiter(s) (100 mL/Hr) IV Continuous <Continuous>  dextrose 5%. 1000 milliLiter(s) (50 mL/Hr) IV Continuous <Continuous>  dextrose 50% Injectable 25 Gram(s) IV Push once  dextrose 50% Injectable 12.5 Gram(s) IV Push once  dextrose 50% Injectable 25 Gram(s) IV Push once  enoxaparin Injectable 40 milliGRAM(s) SubCutaneous every 24 hours  fluticasone propionate 50 MICROgram(s)/spray Nasal Spray 1 Spray(s) Both Nostrils two times a day  gabapentin 300 milliGRAM(s) Oral every 12 hours  glucagon  Injectable 1 milliGRAM(s) IntraMuscular once  influenza  Vaccine (HIGH DOSE) 0.7 milliLiter(s) IntraMuscular once  insulin glargine Injectable (LANTUS) 23 Unit(s) SubCutaneous at bedtime  insulin lispro (ADMELOG) corrective regimen sliding scale   SubCutaneous three times a day before meals  insulin lispro (ADMELOG) corrective regimen sliding scale   SubCutaneous at bedtime  insulin lispro Injectable (ADMELOG) 11 Unit(s) SubCutaneous three times a day before meals  levothyroxine 125 MICROGram(s) Oral daily  lidocaine   4% Patch 1 Patch Transdermal daily  lidocaine 2% (Preservative-free) Injectable 20 milliLiter(s) Local Injection once  LORazepam     Tablet 0.5 milliGRAM(s) Oral once  nicotine - 21 mG/24Hr(s) Patch 1 Patch Transdermal daily  polyethylene glycol 3350 17 Gram(s) Oral daily  senna 2 Tablet(s) Oral at bedtime  tamsulosin 0.4 milliGRAM(s) Oral two times a day  traZODone 150 milliGRAM(s) Oral at bedtime    MEDICATIONS  (PRN):  acetaminophen     Tablet .. 650 milliGRAM(s) Oral every 6 hours PRN Temp greater or equal to 38.5C (101.3F), Mild Pain (1 - 3), Moderate Pain (4 - 6)  ALBUTerol    90 MICROgram(s) HFA Inhaler 2 Puff(s) Inhalation every 6 hours PRN Shortness of Breath and/or Wheezing  aluminum hydroxide/magnesium hydroxide/simethicone Suspension 30 milliLiter(s) Oral every 4 hours PRN Dyspepsia  dextrose Oral Gel 15 Gram(s) Oral once PRN Blood Glucose LESS THAN 70 milliGRAM(s)/deciliter  guaiFENesin Oral Liquid (Sugar-Free) 100 milliGRAM(s) Oral every 6 hours PRN Cough  HYDROmorphone   Tablet 4 milliGRAM(s) Oral every 3 hours PRN Moderate Pain (4 - 6)  HYDROmorphone  Injectable 1 milliGRAM(s) IV Push every 3 hours PRN Severe Pain (7 - 10)      LABS:                        13.6   13.59 )-----------( 161      ( 18 Oct 2022 05:42 )             40.9     Hgb Trend: 13.6<--, 13.8<--, 13.5<--, 13.5<--, 14.1<--  10-18    129<L>  |  89<L>  |  37<H>  ----------------------------<  173<H>  5.3   |  28  |  1.12    Ca    9.2      18 Oct 2022 05:42  Phos  4.0     10-18  Mg     1.90     10-18    TPro  8.4<H>  /  Alb  3.4  /  TBili  0.6  /  DBili  x   /  AST  108<H>  /  ALT  60<H>  /  AlkPhos  58  10-18    Creatinine Trend: 1.12<--, 1.05<--, 1.17<--, 1.11<--, 1.24<--, 1.41<--            MICROBIOLOGY:     RADIOLOGY & ADDITIONAL TESTS:    ASSESSMENT AND PLAN:   67 YOM with known spinal lesions as well as hx of CAD (PCIx3), COPD is here today for 5 days of progressively worsening back pain was found to have lytic lesions concerning for multiple myeloma with MRI showing deformations of spinal cord; awaiting IR for biopsy of a lesion with sufficient yield. Now presenting after RRT for AMS given narcan and haldol and subsequently went in to cardiac arrest and s/p ROSC x3.     Neuro  - AOx0  - MOving extremities   - Will obtain CT head when stable   - Propofol started post arrest     Respiratory   Nick Potter MD   Internal Medicine PGY3 MICU Accept Note    CHIEF COMPLAINT:     HPI / INTERVAL HISTORY: Patient is a 67 YOM with hx of CABG s/p PCIx3, 48 PY Smoking hx, stroke (25ya, w/ residual left sided facial weakness), COPD, GERD, gout, hypothyroidism here for 5 days of worsening radiating upper back pain. Patient has known lesions within the thoracic spine that was supposed to be biopsied several months ago, but was lost to followup. He is now admitted for further workup of back pain.    Hospital Course  Patient found to have expansion of thoracic lesions with signs concerning for cord compression, but lacked any red flag signs. Patient seen by NSGY team and assessed to have no need for acute intervention. Hematology consulted for high suspicion of MM with elevated kappa/lambda ratio; with BM Bx was performed on 10/13/22 which was insufficient. On 10/19 subsequent biopsy of thoracic lesions were performed.     RRT called today for AMS concern for in the setting of possible opioid or benzodiazepines vs. metabolic encephalopathy. The patient was given narcan and became incredibly agitated at which point he was given haldol. The patient then went into PEA arrest and ACLS protocol was initiated. At which time, ROSC was obtained however he went back into PEA arrest. ROSC obtained a second time. On a third occasion prior to transport patient underwent PEA arrest. ROSC achieved a third  time. Noted to be in wide complex tachycardia consistent with Vtach with a stable blood pressure albeit on pressers. The patient was cardioverted and loaded with amiodarone 150mg. His blood pressure and all vitals stabilized. He was transferred to the MICU.     PAST MEDICAL & SURGICAL HISTORY:  CAD (coronary artery disease)      Gout      Chronic obstructive pulmonary disease (COPD)      Hypothyroidism      GERD (gastroesophageal reflux disease)      No significant past surgical history      No significant past surgical history          FAMILY HISTORY:  Family hx of lung cancer (Mother)        SOCIAL HISTORY: Unknown   HOME MEDICATIONS: Reviewed       Allergies    No Known Allergies    Intolerances          REVIEW OF SYSTEMS: Unable to obtain     OBJECTIVE:  ICU Vital Signs Last 24 Hrs  T(C): 36.4 (18 Oct 2022 05:48), Max: 36.7 (17 Oct 2022 21:28)  T(F): 97.6 (18 Oct 2022 05:48), Max: 98 (17 Oct 2022 21:28)  HR: 86 (18 Oct 2022 05:48) (85 - 98)  BP: 129/77 (18 Oct 2022 05:48) (110/84 - 129/77)  BP(mean): --  ABP: --  ABP(mean): --  RR: 18 (18 Oct 2022 05:48) (17 - 18)  SpO2: 96% (18 Oct 2022 05:48) (96% - 98%)    O2 Parameters below as of 18 Oct 2022 05:48  Patient On (Oxygen Delivery Method): room air              10-17 @ 07:01  -  10-18 @ 07:00  --------------------------------------------------------  IN: 350 mL / OUT: 1050 mL / NET: -700 mL      CAPILLARY BLOOD GLUCOSE      POCT Blood Glucose.: 277 mg/dL (18 Oct 2022 07:44)      PHYSICAL EXAM:  General: lethargic, intubated   HEENT: No evidence of airway compromise, ET tube in place   Neck: Supple  Chest/Lungs: CTA bilaterally without wheezing, rales or rhonchi   Heart: Normal s1 and s2 w/o murmur, rub or gallop, RRR   Abdomen: Soft, NT, ND   Extremities: Normal temperature, no diaphoresis   Skin: No cyanosis or erythema   Neuro: AOX 0    LINES: IO on left leg and IV on right forearm     HOSPITAL MEDICATIONS:  MEDICATIONS  (STANDING):  albuterol/ipratropium for Nebulization 3 milliLiter(s) Nebulizer every 12 hours  allopurinol 100 milliGRAM(s) Oral daily  aspirin  chewable 81 milliGRAM(s) Oral daily  atorvastatin 80 milliGRAM(s) Oral at bedtime  bisacodyl 5 milliGRAM(s) Oral at bedtime  budesonide 160 MICROgram(s)/formoterol 4.5 MICROgram(s) Inhaler 2 Puff(s) Inhalation two times a day  buPROPion XL (24-Hour) . 150 milliGRAM(s) Oral daily  dexAMETHasone     Tablet 4 milliGRAM(s) Oral every 6 hours  dextrose 5%. 1000 milliLiter(s) (100 mL/Hr) IV Continuous <Continuous>  dextrose 5%. 1000 milliLiter(s) (50 mL/Hr) IV Continuous <Continuous>  dextrose 50% Injectable 25 Gram(s) IV Push once  dextrose 50% Injectable 12.5 Gram(s) IV Push once  dextrose 50% Injectable 25 Gram(s) IV Push once  enoxaparin Injectable 40 milliGRAM(s) SubCutaneous every 24 hours  fluticasone propionate 50 MICROgram(s)/spray Nasal Spray 1 Spray(s) Both Nostrils two times a day  gabapentin 300 milliGRAM(s) Oral every 12 hours  glucagon  Injectable 1 milliGRAM(s) IntraMuscular once  influenza  Vaccine (HIGH DOSE) 0.7 milliLiter(s) IntraMuscular once  insulin glargine Injectable (LANTUS) 23 Unit(s) SubCutaneous at bedtime  insulin lispro (ADMELOG) corrective regimen sliding scale   SubCutaneous three times a day before meals  insulin lispro (ADMELOG) corrective regimen sliding scale   SubCutaneous at bedtime  insulin lispro Injectable (ADMELOG) 11 Unit(s) SubCutaneous three times a day before meals  levothyroxine 125 MICROGram(s) Oral daily  lidocaine   4% Patch 1 Patch Transdermal daily  lidocaine 2% (Preservative-free) Injectable 20 milliLiter(s) Local Injection once  LORazepam     Tablet 0.5 milliGRAM(s) Oral once  nicotine - 21 mG/24Hr(s) Patch 1 Patch Transdermal daily  polyethylene glycol 3350 17 Gram(s) Oral daily  senna 2 Tablet(s) Oral at bedtime  tamsulosin 0.4 milliGRAM(s) Oral two times a day  traZODone 150 milliGRAM(s) Oral at bedtime    MEDICATIONS  (PRN):  acetaminophen     Tablet .. 650 milliGRAM(s) Oral every 6 hours PRN Temp greater or equal to 38.5C (101.3F), Mild Pain (1 - 3), Moderate Pain (4 - 6)  ALBUTerol    90 MICROgram(s) HFA Inhaler 2 Puff(s) Inhalation every 6 hours PRN Shortness of Breath and/or Wheezing  aluminum hydroxide/magnesium hydroxide/simethicone Suspension 30 milliLiter(s) Oral every 4 hours PRN Dyspepsia  dextrose Oral Gel 15 Gram(s) Oral once PRN Blood Glucose LESS THAN 70 milliGRAM(s)/deciliter  guaiFENesin Oral Liquid (Sugar-Free) 100 milliGRAM(s) Oral every 6 hours PRN Cough  HYDROmorphone   Tablet 4 milliGRAM(s) Oral every 3 hours PRN Moderate Pain (4 - 6)  HYDROmorphone  Injectable 1 milliGRAM(s) IV Push every 3 hours PRN Severe Pain (7 - 10)      LABS:                        13.6   13.59 )-----------( 161      ( 18 Oct 2022 05:42 )             40.9     Hgb Trend: 13.6<--, 13.8<--, 13.5<--, 13.5<--, 14.1<--  10-18    129<L>  |  89<L>  |  37<H>  ----------------------------<  173<H>  5.3   |  28  |  1.12    Ca    9.2      18 Oct 2022 05:42  Phos  4.0     10-18  Mg     1.90     10-18    TPro  8.4<H>  /  Alb  3.4  /  TBili  0.6  /  DBili  x   /  AST  108<H>  /  ALT  60<H>  /  AlkPhos  58  10-18    Creatinine Trend: 1.12<--, 1.05<--, 1.17<--, 1.11<--, 1.24<--, 1.41<--            MICROBIOLOGY:     RADIOLOGY & ADDITIONAL TESTS:    ASSESSMENT AND PLAN:   67 YOM with known spinal lesions as well as hx of CAD (PCIx3), COPD is here today for 5 days of progressively worsening back pain was found to have lytic lesions concerning for multiple myeloma with MRI showing deformations of spinal cord; awaiting IR for biopsy of a lesion with sufficient yield. Now presenting after RRT for AMS given narcan and haldol and subsequently went in to cardiac arrest and s/p ROSC x3.     Neuro  - AOx0  - MOving extremities   - Will obtain CT head when stable   - Propofol started post arrest     Respiratory   Nick Potter MD   Internal Medicine PGY3    Attending attestation    Patient seen and examined at bedside with the MICU Resident. Agree with above.     67 Y.O. male from NS home, CAD, with previous stent known obstructive disease but not a candiate for PCI, COPD and newly dz of likely diffusely spread MM with in hospital arrest.     Patient is s/p arrest x3  in the hospital with PEA x2 and VT/VF x1. Patient brought to the MICU again with no BP, and now in PEA to Asystole., Given multiple code in short period of time, while on mechanical ventilation and high dose of pressors. At his 4th cardiac arrest patient was pronounced. Family was notified.     Patient is critically ill, requiring critical care services.     Attending: I have personally and independently provided 30 minutes of critical care services.  This excludes any time spent on separate procedures or teaching. MICU Accept Note    CHIEF COMPLAINT:     HPI / INTERVAL HISTORY: Patient is a 67 YOM with hx of CABG s/p PCIx3, 48 PY Smoking hx, stroke (25ya, w/ residual left sided facial weakness), COPD, GERD, gout, hypothyroidism here for 5 days of worsening radiating upper back pain. Patient has known lesions within the thoracic spine that was supposed to be biopsied several months ago, but was lost to followup. He is now admitted for further workup of back pain.    Hospital Course  Patient found to have expansion of thoracic lesions with signs concerning for cord compression, but lacked any red flag signs. Patient seen by NSGY team and assessed to have no need for acute intervention. Hematology consulted for high suspicion of MM with elevated kappa/lambda ratio; with BM Bx was performed on 10/13/22 which was insufficient. On 10/19 subsequent biopsy of thoracic lesions were performed.     RRT called today for AMS concern for in the setting of possible opioid or benzodiazepines vs. metabolic encephalopathy. The patient was given narcan and became incredibly agitated at which point he was given haldol. The patient then went into PEA arrest and ACLS protocol was initiated. At which time, ROSC was obtained however he went back into PEA arrest. ROSC obtained a second time. On a third occasion prior to transport patient underwent PEA arrest. ROSC achieved a third  time. Noted to be in wide complex tachycardia consistent with Vtach with a stable blood pressure albeit on pressers. The patient was cardioverted and loaded with amiodarone 150mg. His blood pressure and all vitals stabilized. He was transferred to the MICU.     PAST MEDICAL & SURGICAL HISTORY:  CAD (coronary artery disease)      Gout      Chronic obstructive pulmonary disease (COPD)      Hypothyroidism      GERD (gastroesophageal reflux disease)      No significant past surgical history      No significant past surgical history          FAMILY HISTORY:  Family hx of lung cancer (Mother)        SOCIAL HISTORY: Unknown   HOME MEDICATIONS: Reviewed       Allergies    No Known Allergies    Intolerances          REVIEW OF SYSTEMS: Unable to obtain     OBJECTIVE:  ICU Vital Signs Last 24 Hrs  T(C): 36.4 (18 Oct 2022 05:48), Max: 36.7 (17 Oct 2022 21:28)  T(F): 97.6 (18 Oct 2022 05:48), Max: 98 (17 Oct 2022 21:28)  HR: 86 (18 Oct 2022 05:48) (85 - 98)  BP: 129/77 (18 Oct 2022 05:48) (110/84 - 129/77)  BP(mean): --  ABP: --  ABP(mean): --  RR: 18 (18 Oct 2022 05:48) (17 - 18)  SpO2: 96% (18 Oct 2022 05:48) (96% - 98%)    O2 Parameters below as of 18 Oct 2022 05:48  Patient On (Oxygen Delivery Method): room air              10-17 @ 07:01  -  10-18 @ 07:00  --------------------------------------------------------  IN: 350 mL / OUT: 1050 mL / NET: -700 mL      CAPILLARY BLOOD GLUCOSE      POCT Blood Glucose.: 277 mg/dL (18 Oct 2022 07:44)      PHYSICAL EXAM:  General: lethargic, intubated   HEENT: No evidence of airway compromise, ET tube in place   Neck: Supple  Chest/Lungs: CTA bilaterally without wheezing, rales or rhonchi   Heart: Normal s1 and s2 w/o murmur, rub or gallop, RRR   Abdomen: Soft, NT, ND   Extremities: Normal temperature, no diaphoresis   Skin: No cyanosis or erythema   Neuro: AOX 0    LINES: IO on left leg and IV on right forearm     HOSPITAL MEDICATIONS:  MEDICATIONS  (STANDING):  albuterol/ipratropium for Nebulization 3 milliLiter(s) Nebulizer every 12 hours  allopurinol 100 milliGRAM(s) Oral daily  aspirin  chewable 81 milliGRAM(s) Oral daily  atorvastatin 80 milliGRAM(s) Oral at bedtime  bisacodyl 5 milliGRAM(s) Oral at bedtime  budesonide 160 MICROgram(s)/formoterol 4.5 MICROgram(s) Inhaler 2 Puff(s) Inhalation two times a day  buPROPion XL (24-Hour) . 150 milliGRAM(s) Oral daily  dexAMETHasone     Tablet 4 milliGRAM(s) Oral every 6 hours  dextrose 5%. 1000 milliLiter(s) (100 mL/Hr) IV Continuous <Continuous>  dextrose 5%. 1000 milliLiter(s) (50 mL/Hr) IV Continuous <Continuous>  dextrose 50% Injectable 25 Gram(s) IV Push once  dextrose 50% Injectable 12.5 Gram(s) IV Push once  dextrose 50% Injectable 25 Gram(s) IV Push once  enoxaparin Injectable 40 milliGRAM(s) SubCutaneous every 24 hours  fluticasone propionate 50 MICROgram(s)/spray Nasal Spray 1 Spray(s) Both Nostrils two times a day  gabapentin 300 milliGRAM(s) Oral every 12 hours  glucagon  Injectable 1 milliGRAM(s) IntraMuscular once  influenza  Vaccine (HIGH DOSE) 0.7 milliLiter(s) IntraMuscular once  insulin glargine Injectable (LANTUS) 23 Unit(s) SubCutaneous at bedtime  insulin lispro (ADMELOG) corrective regimen sliding scale   SubCutaneous three times a day before meals  insulin lispro (ADMELOG) corrective regimen sliding scale   SubCutaneous at bedtime  insulin lispro Injectable (ADMELOG) 11 Unit(s) SubCutaneous three times a day before meals  levothyroxine 125 MICROGram(s) Oral daily  lidocaine   4% Patch 1 Patch Transdermal daily  lidocaine 2% (Preservative-free) Injectable 20 milliLiter(s) Local Injection once  LORazepam     Tablet 0.5 milliGRAM(s) Oral once  nicotine - 21 mG/24Hr(s) Patch 1 Patch Transdermal daily  polyethylene glycol 3350 17 Gram(s) Oral daily  senna 2 Tablet(s) Oral at bedtime  tamsulosin 0.4 milliGRAM(s) Oral two times a day  traZODone 150 milliGRAM(s) Oral at bedtime    MEDICATIONS  (PRN):  acetaminophen     Tablet .. 650 milliGRAM(s) Oral every 6 hours PRN Temp greater or equal to 38.5C (101.3F), Mild Pain (1 - 3), Moderate Pain (4 - 6)  ALBUTerol    90 MICROgram(s) HFA Inhaler 2 Puff(s) Inhalation every 6 hours PRN Shortness of Breath and/or Wheezing  aluminum hydroxide/magnesium hydroxide/simethicone Suspension 30 milliLiter(s) Oral every 4 hours PRN Dyspepsia  dextrose Oral Gel 15 Gram(s) Oral once PRN Blood Glucose LESS THAN 70 milliGRAM(s)/deciliter  guaiFENesin Oral Liquid (Sugar-Free) 100 milliGRAM(s) Oral every 6 hours PRN Cough  HYDROmorphone   Tablet 4 milliGRAM(s) Oral every 3 hours PRN Moderate Pain (4 - 6)  HYDROmorphone  Injectable 1 milliGRAM(s) IV Push every 3 hours PRN Severe Pain (7 - 10)      LABS:                        13.6   13.59 )-----------( 161      ( 18 Oct 2022 05:42 )             40.9     Hgb Trend: 13.6<--, 13.8<--, 13.5<--, 13.5<--, 14.1<--  10-18    129<L>  |  89<L>  |  37<H>  ----------------------------<  173<H>  5.3   |  28  |  1.12    Ca    9.2      18 Oct 2022 05:42  Phos  4.0     10-18  Mg     1.90     10-18    TPro  8.4<H>  /  Alb  3.4  /  TBili  0.6  /  DBili  x   /  AST  108<H>  /  ALT  60<H>  /  AlkPhos  58  10-18    Creatinine Trend: 1.12<--, 1.05<--, 1.17<--, 1.11<--, 1.24<--, 1.41<--            MICROBIOLOGY:     RADIOLOGY & ADDITIONAL TESTS:    ASSESSMENT AND PLAN:   67 YOM with known spinal lesions as well as hx of CAD (PCIx3), COPD is here today for 5 days of progressively worsening back pain was found to have lytic lesions concerning for multiple myeloma with MRI showing deformations of spinal cord; awaiting IR for biopsy of a lesion with sufficient yield. Now presenting after RRT for AMS given narcan and haldol and subsequently went in to cardiac arrest and s/p ROSC x3.     Patient went into cardiac arrest shortly after being in the MICU first PEA then asystole and the patient had no heart beat on POCUS. The patient was pronounced in the MICU shortly thereafter.     Family was informed. 4 total arrests. 3 with ROSC. Underlying CAD and possible metabolic derrangements required high dose pressors and ventilation for brief duration in the MICU.       Nick Potter MD   Internal Medicine PGY3    Attending attestation    Patient seen and examined at bedside with the MICU Resident. Agree with above.     67 Y.O. male from [place] home, CAD, with previous stent known obstructive disease but not a candiate for PCI, COPD and newly dz of likely diffusely spread MM with in hospital arrest.     Patient is s/p arrest x3  in the hospital with PEA x2 and VT/VF x1. Patient brought to the MICU again with no BP, and now in PEA to Asystole., Given multiple code in short period of time, while on mechanical ventilation and high dose of pressors. At his 4th cardiac arrest patient was pronounced. Family was notified.     Patient is critically ill, requiring critical care services.     Attending: I have personally and independently provided 30 minutes of critical care services.  This excludes any time spent on separate procedures or teaching.

## 2022-10-18 NOTE — PROGRESS NOTE ADULT - PROBLEM SELECTOR PLAN 6
23U Lantus; 11 Bolus  Moderate Sliding Scale    Steroid induced Hyperglycemia; 60/40 distribution for Basal/Bolus

## 2022-10-18 NOTE — PROGRESS NOTE ADULT - ASSESSMENT
67 YOM with known spinal lesions as well as hx of CAD (PCIx3), COPD is here today for 5 days of progressively worsening back pain was found to have lytic lesions concerning for multiple myeloma with MRI showing deformations of spinal cord; awaiting IR for biopsy of a lesion with sufficient yield

## 2022-10-18 NOTE — PROGRESS NOTE ADULT - PROBLEM SELECTOR PLAN 3
Consider PE  Patient's LLE larger than RLE; however patient reports this has been chronic  Patient's HR is normal  O2 sat is good on RA, 2LNC given largely for comfort    F/U U/S Dopplers

## 2022-10-18 NOTE — PROGRESS NOTE ADULT - NUTRITIONAL ASSESSMENT
This patient has been assessed with a concern for Malnutrition and has been determined to have a diagnosis/diagnoses of Severe protein-calorie malnutrition.    This patient is being managed with:   Diet Consistent Carbohydrate/No Snacks-  DASH/TLC {Sodium & Cholesterol Restricted} (DASH)  Supplement Feeding Modality:  Oral  Glucerna Shake Cans or Servings Per Day:  2       Frequency:  Daily  Entered: Oct 15 2022  3:22PM    

## 2022-10-18 NOTE — PROGRESS NOTE ADULT - SUBJECTIVE AND OBJECTIVE BOX
INCOMPLETE NOTE    Jesus Vázquez | PGY1| Pager: [phone]  Interval Events:    REVIEW OF SYSTEMS:  CONSTITUTIONAL: No weakness, fevers or chills  EYES/ENT: No visual changes;  No vertigo or throat pain   NECK: No pain or stiffness  RESPIRATORY: No cough, wheezing, hemoptysis; No shortness of breath  CARDIOVASCULAR: No chest pain or palpitations  GASTROINTESTINAL: No abdominal or epigastric pain. No nausea, vomiting, or hematemesis; No diarrhea or constipation. No melena or hematochezia.  GENITOURINARY: No dysuria, frequency or hematuria  NEUROLOGICAL: No numbness or weakness  SKIN: No itching, burning, rashes, or lesions   All other review of systems is negative unless indicated above.    OBJECTIVE:  ICU Vital Signs Last 24 Hrs  T(C): 36.4 (18 Oct 2022 05:48), Max: 36.7 (17 Oct 2022 21:28)  T(F): 97.6 (18 Oct 2022 05:48), Max: 98 (17 Oct 2022 21:28)  HR: 86 (18 Oct 2022 05:48) (85 - 98)  BP: 129/77 (18 Oct 2022 05:48) (110/84 - 129/77)  BP(mean): --  ABP: --  ABP(mean): --  RR: 18 (18 Oct 2022 05:48) (17 - 18)  SpO2: 96% (18 Oct 2022 05:48) (96% - 98%)    O2 Parameters below as of 18 Oct 2022 05:48  Patient On (Oxygen Delivery Method): room air              10-16 @ 07:01  -  10-17 @ 07:00  --------------------------------------------------------  IN: 600 mL / OUT: 1450 mL / NET: -850 mL    10-17 @ 07:01  -  10-18 @ 06:45  --------------------------------------------------------  IN: 350 mL / OUT: 1050 mL / NET: -700 mL      CAPILLARY BLOOD GLUCOSE      POCT Blood Glucose.: 105 mg/dL (17 Oct 2022 21:48)      PHYSICAL EXAM:  General: WN/WD NAD  Neurology: A&Ox3, nonfocal, FOWLER x 4  Eyes: PERRLA/ EOMI, Gross vision intact  ENT/Neck: Neck supple, trachea midline, No JVD, Gross hearing intact  Respiratory: CTA B/L, No wheezing, rales, rhonchi  CV: RRR, +S1/S2, -S3/S4, no murmurs, rubs or gallops  Abdominal: Soft, NT, ND +BS, No HSM  MSK: 5/5 strength UE/LE bilaterally  Extremities: No edema, 2+ peripheral pulses  Skin: No Rashes, Hematoma, Ecchymosis  Incisions:   Tubes:    HOSPITAL MEDICATIONS:  MEDICATIONS  (STANDING):  albuterol/ipratropium for Nebulization 3 milliLiter(s) Nebulizer every 12 hours  allopurinol 100 milliGRAM(s) Oral daily  aspirin  chewable 81 milliGRAM(s) Oral daily  atorvastatin 80 milliGRAM(s) Oral at bedtime  bisacodyl 5 milliGRAM(s) Oral at bedtime  budesonide 160 MICROgram(s)/formoterol 4.5 MICROgram(s) Inhaler 2 Puff(s) Inhalation two times a day  buPROPion XL (24-Hour) . 150 milliGRAM(s) Oral daily  dexAMETHasone     Tablet 4 milliGRAM(s) Oral every 6 hours  dextrose 5%. 1000 milliLiter(s) (100 mL/Hr) IV Continuous <Continuous>  dextrose 5%. 1000 milliLiter(s) (50 mL/Hr) IV Continuous <Continuous>  dextrose 50% Injectable 25 Gram(s) IV Push once  dextrose 50% Injectable 12.5 Gram(s) IV Push once  dextrose 50% Injectable 25 Gram(s) IV Push once  enoxaparin Injectable 40 milliGRAM(s) SubCutaneous every 24 hours  fluticasone propionate 50 MICROgram(s)/spray Nasal Spray 1 Spray(s) Both Nostrils two times a day  gabapentin 300 milliGRAM(s) Oral every 12 hours  glucagon  Injectable 1 milliGRAM(s) IntraMuscular once  influenza  Vaccine (HIGH DOSE) 0.7 milliLiter(s) IntraMuscular once  insulin glargine Injectable (LANTUS) 23 Unit(s) SubCutaneous at bedtime  insulin lispro (ADMELOG) corrective regimen sliding scale   SubCutaneous three times a day before meals  insulin lispro (ADMELOG) corrective regimen sliding scale   SubCutaneous at bedtime  insulin lispro Injectable (ADMELOG) 11 Unit(s) SubCutaneous three times a day before meals  levothyroxine 125 MICROGram(s) Oral daily  lidocaine   4% Patch 1 Patch Transdermal daily  lidocaine 2% (Preservative-free) Injectable 20 milliLiter(s) Local Injection once  LORazepam     Tablet 0.5 milliGRAM(s) Oral once  nicotine - 21 mG/24Hr(s) Patch 1 Patch Transdermal daily  polyethylene glycol 3350 17 Gram(s) Oral daily  senna 2 Tablet(s) Oral at bedtime  tamsulosin 0.4 milliGRAM(s) Oral two times a day  traZODone 150 milliGRAM(s) Oral at bedtime    MEDICATIONS  (PRN):  acetaminophen     Tablet .. 650 milliGRAM(s) Oral every 6 hours PRN Temp greater or equal to 38.5C (101.3F), Mild Pain (1 - 3), Moderate Pain (4 - 6)  ALBUTerol    90 MICROgram(s) HFA Inhaler 2 Puff(s) Inhalation every 6 hours PRN Shortness of Breath and/or Wheezing  aluminum hydroxide/magnesium hydroxide/simethicone Suspension 30 milliLiter(s) Oral every 4 hours PRN Dyspepsia  dextrose Oral Gel 15 Gram(s) Oral once PRN Blood Glucose LESS THAN 70 milliGRAM(s)/deciliter  guaiFENesin Oral Liquid (Sugar-Free) 100 milliGRAM(s) Oral every 6 hours PRN Cough  HYDROmorphone   Tablet 4 milliGRAM(s) Oral every 3 hours PRN Moderate Pain (4 - 6)  HYDROmorphone  Injectable 1 milliGRAM(s) IV Push every 3 hours PRN Severe Pain (7 - 10)      LABS:                        13.8   11.14 )-----------( 142      ( 17 Oct 2022 04:45 )             41.8     Hgb Trend: 13.8<--, 13.5<--, 13.5<--, 14.1<--, 14.5<--  10-17    125<L>  |  90<L>  |  43<H>  ----------------------------<  279<H>  5.0   |  23  |  1.05    Ca    8.5      17 Oct 2022 17:40  Phos  3.2     10-17  Mg     1.70     10-17    TPro  8.7<H>  /  Alb  3.4  /  TBili  0.5  /  DBili  x   /  AST  22  /  ALT  19  /  AlkPhos  55  10-17    Creatinine Trend: 1.05<--, 1.17<--, 1.11<--, 1.24<--, 1.41<--, 1.27<--            MICROBIOLOGY:

## 2022-10-18 NOTE — PROGRESS NOTE ADULT - PROBLEM SELECTOR PLAN 11
#Rule out ACS  EKG Shows no signs of new infarcts  Trops/CKMB Trending down
#Rule out ACS  EKG Shows no signs of new infarcts  Trops/CKMB Trending down

## 2022-10-18 NOTE — PROGRESS NOTE ADULT - REASON FOR ADMISSION
Worsening back pain

## 2022-10-18 NOTE — PROGRESS NOTE ADULT - PROBLEM SELECTOR PLAN 4
Na: 124  Glucose 248  Serum Osm: 303  Triglyceride: 151    Given Multiple Myeloma is likely; consider hyponatremia is 2/2 plasma cell dyscrasia

## 2022-10-18 NOTE — CONSULT NOTE ADULT - SUBJECTIVE AND OBJECTIVE BOX
HPI:  67 YOM with hx of CABG s/p PCIx3, 50 PY smoking hx, stroke (25 years ago), came to the ED from [facility] c/o mid to lower back pains radiating to the mid-chest without  acute neurological symptoms.  He states he had a fall 3 months ago.   He has no biopsy proven cancer now and had a prior IR guided bone marrow biopsy on 10/13/22 which was insufficient to prove a final diagnosis.       Prior hx:   Patient was recently admitted to [facility] in July for chest pain and a syncopal episode and was cleared with no intervention. Patient was also planned for IR biopsy of his thoracic lesion with IR, but was unable to go to outpatient appointment and never followed up. He had seen Dr. Simental at [facility] (see Allscripts. )     He reports 5 days ago, he began to get worsening back pain as well as chest tightness that worsened when he coughed. Reports that his tylenol pain medications were insufficient in managing his pain.    MRI below shows T5 cord involvement and also at T7, T8, and T10.    Seen by neurosurgery and planned for spine decompression and fusion prior to RT.  (Dr. Gustafson):     - Cont decadron 4q6  - in OR, will be able to send bone and soft tissue sample for onc biopsy, no need for IR biopsy  - May need IR for pedicle marker prior to OR, will fu closer to OR date/after clearances   - Needs Rad Onc consult for postop RT planning   - Ok to cont [id number] pending cards/pulm eval will need to hold 48h for OR,   - cont fu MM labs  - Pain control per primary    < from: MR Thoracic Spine w/wo IV Cont (10.17.22 @ 11:05) >  IMPRESSION:    1. Diffuse osseous metastases.  T5 right ventral epidural disease   compromises the ventral cord surface and is associated with cord edema   and deformity.  Additional locations of more limited epidural disease   without direct cord compromise including T7, T8 and T10    2. Thoracic degenerative disc disease is most prominent at intervertebral   disc levels adjacent to the most prominent osseous metastases, including   T5-T6 left central disc protrusion and T10-T11 right central disc   protrusion (disc herniations) that cause ventral cord deformity    < end of copied text >        Allergies    No Known Allergies    Intolerances        ROS: [  ] Fever  [  ] Chills  [  ]Chest Pain [  ] SOB  [  ]Cough [  ] N/V  [  ] Diarrhea [  ]Constipation [  ]Other ROS:  [  ] ROS otherwise negative    PAST MEDICAL & SURGICAL HISTORY:  CAD (coronary artery disease)    Gout    Chronic obstructive pulmonary disease (COPD)    Hypothyroidism    GERD (gastroesophageal reflux disease)    No significant past surgical history        FAMILY HISTORY:  Family hx of lung cancer (Mother)        MEDICATIONS  (STANDING):  albuterol/ipratropium for Nebulization 3 milliLiter(s) Nebulizer every 12 hours  allopurinol 100 milliGRAM(s) Oral daily  aspirin  chewable 81 milliGRAM(s) Oral daily  atorvastatin 80 milliGRAM(s) Oral at bedtime  bisacodyl 5 milliGRAM(s) Oral at bedtime  budesonide 160 MICROgram(s)/formoterol 4.5 MICROgram(s) Inhaler 2 Puff(s) Inhalation two times a day  buPROPion XL (24-Hour) . 150 milliGRAM(s) Oral daily  dexAMETHasone     Tablet 4 milliGRAM(s) Oral every 6 hours  dextrose 5%. 1000 milliLiter(s) (100 mL/Hr) IV Continuous <Continuous>  dextrose 5%. 1000 milliLiter(s) (50 mL/Hr) IV Continuous <Continuous>  dextrose 50% Injectable 25 Gram(s) IV Push once  dextrose 50% Injectable 12.5 Gram(s) IV Push once  dextrose 50% Injectable 25 Gram(s) IV Push once  enoxaparin Injectable 40 milliGRAM(s) SubCutaneous every 24 hours  fluticasone propionate 50 MICROgram(s)/spray Nasal Spray 1 Spray(s) Both Nostrils two times a day  gabapentin 300 milliGRAM(s) Oral every 12 hours  glucagon  Injectable 1 milliGRAM(s) IntraMuscular once  haloperidol    Injectable 2.5 milliGRAM(s) IntraMuscular once  influenza  Vaccine (HIGH DOSE) 0.7 milliLiter(s) IntraMuscular once  insulin glargine Injectable (LANTUS) 23 Unit(s) SubCutaneous at bedtime  insulin lispro (ADMELOG) corrective regimen sliding scale   SubCutaneous three times a day before meals  insulin lispro (ADMELOG) corrective regimen sliding scale   SubCutaneous at bedtime  insulin lispro Injectable (ADMELOG) 11 Unit(s) SubCutaneous three times a day before meals  levothyroxine 125 MICROGram(s) Oral daily  lidocaine   4% Patch 1 Patch Transdermal daily  lidocaine 2% (Preservative-free) Injectable 20 milliLiter(s) Local Injection once  LORazepam     Tablet 0.5 milliGRAM(s) Oral once  nicotine - 21 mG/24Hr(s) Patch 1 Patch Transdermal daily  polyethylene glycol 3350 17 Gram(s) Oral daily  senna 2 Tablet(s) Oral at bedtime  tamsulosin 0.4 milliGRAM(s) Oral two times a day  traZODone 150 milliGRAM(s) Oral at bedtime    MEDICATIONS  (PRN):  acetaminophen     Tablet .. 650 milliGRAM(s) Oral every 6 hours PRN Temp greater or equal to 38.5C (101.3F), Mild Pain (1 - 3), Moderate Pain (4 - 6)  ALBUTerol    90 MICROgram(s) HFA Inhaler 2 Puff(s) Inhalation every 6 hours PRN Shortness of Breath and/or Wheezing  aluminum hydroxide/magnesium hydroxide/simethicone Suspension 30 milliLiter(s) Oral every 4 hours PRN Dyspepsia  dextrose Oral Gel 15 Gram(s) Oral once PRN Blood Glucose LESS THAN 70 milliGRAM(s)/deciliter  guaiFENesin Oral Liquid (Sugar-Free) 100 milliGRAM(s) Oral every 6 hours PRN Cough  HYDROmorphone   Tablet 4 milliGRAM(s) Oral every 3 hours PRN Moderate Pain (4 - 6)  HYDROmorphone  Injectable 1 milliGRAM(s) IV Push every 3 hours PRN Severe Pain (7 - 10)      PHYSICAL EXAM  Vital Signs Last 24 Hrs  T(C): 36.4 (18 Oct 2022 05:48), Max: 36.7 (17 Oct 2022 21:28)  T(F): 97.6 (18 Oct 2022 05:48), Max: 98 (17 Oct 2022 21:28)  HR: 86 (18 Oct 2022 05:48) (85 - 98)  BP: 129/77 (18 Oct 2022 05:48) (110/84 - 129/77)  BP(mean): --  RR: 18 (18 Oct 2022 05:48) (17 - 18)  SpO2: 96% (18 Oct 2022 05:48) (96% - 98%)    Parameters below as of 18 Oct 2022 05:48  Patient On (Oxygen Delivery Method): room air        General: Well nourished, well developed, no acute distress  HEENT: NC/AT; EOMI, PERRL, sclera nonicteric; external ears normal; no rhinorrhea or epistaxis; mucous membranes moist; oropharynx clear and without erythema  CV: NR, RR; no appreciable r/m/g  Lungs: CTAB, no increased work of breathing  Abdomen: Bowel sounds present; soft, NTND  MSK: Vertebral spine non-tender to palpation  Neuro: AAOx3; cranial nerves II-XII intact; strength 5/5 in upper and lower extremities; sensation to light touch in tact bilaterally.  Psych: Full affect; mood congruent  Skin: no visible rashes on limited examination    IMAGING/LABS/PATHOLOGY: I have personally reviewed the relevant labs, pathology, and imaging as noted in the HPI.  In addition,                          13.6   13.59 )-----------( 161      ( 18 Oct 2022 05:42 )             40.9     10-18    129<L>  |  89<L>  |  37<H>  ----------------------------<  173<H>  5.3   |  28  |  1.12    Ca    9.2      18 Oct 2022 05:42  Phos  4.0     10-18  Mg     1.90     10-18    TPro  8.4<H>  /  Alb  3.4  /  TBili  0.6  /  DBili  x   /  AST  108<H>  /  ALT  60<H>  /  AlkPhos  58  10-18        ASSESSMENT/PLAN    DALIA ESTRADA is a 67y man with no biopsy proven cancer but myeloma is suspected, comes to the ED on 10/11/22 from an [facility]isted [facility] c/o mid back pains to low back pains radiating to   his chest.  He had a fall 3 months ago.  MRI shows cord involvement at T5, T7-T8 and T10 and he is planned for surgical decompression/fusion prior to adjuvant RT.  While in the OR, biopsies can be taken  so no IR guided biopsy is necessary for suspected myeloma.    Will continue to follow and based on NSG recommendations will likely need adjuvant RT about 3 weeks post operative which can be done as an outpatient at [facility].   Also, needs to f/u with Dr. Simental- oncologist at [facility].   Thank you.     HPI:  67 YOM with hx of CABG s/p PCIx3, 48 PY smoking hx, stroke (25 years ago), came to the ED from [facility] c/o mid to lower back pains radiating to the mid-chest without  acute neurological symptoms.  He states he had a fall 3 months ago.   He has no biopsy proven cancer now and had a prior IR guided bone marrow biopsy on 10/13/22 which was insufficient to prove a final diagnosis.       Prior hx:   Patient was recently admitted to [facility] in July for chest pain and a syncopal episode and was cleared with no intervention. Patient was also planned for IR biopsy of his thoracic lesion with IR, but was unable to go to outpatient appointment and never followed up. He had seen Dr. Simental at [facility] (see [employer]scripts. )     He reports 5 days ago, he began to get worsening back pain as well as chest tightness that worsened when he coughed. Reports that his tylenol pain medications were insufficient in managing his pain.    MRI below shows T5 cord involvement and also at T7, T8, and T10.    Seen by neurosurgery and planned for spine decompression and fusion prior to RT.  (Dr. Gustafson):     - Cont decadron 4q6  - in OR, will be able to send bone and soft tissue sample for onc biopsy, no need for IR biopsy  - May need IR for pedicle marker prior to OR, will fu closer to OR date/after clearances   - Needs Rad Onc consult for postop RT planning   - Ok to cont ASA81 pending cards/pulm eval will need to hold 48h for OR,   - cont fu MM labs  - Pain control per primary    < from: MR Thoracic Spine w/wo IV Cont (10.17.22 @ 11:05) >  IMPRESSION:    1. Diffuse osseous metastases.  T5 right ventral epidural disease   compromises the ventral cord surface and is associated with cord edema   and deformity.  Additional locations of more limited epidural disease   without direct cord compromise including T7, T8 and T10    2. Thoracic degenerative disc disease is most prominent at intervertebral   disc levels adjacent to the most prominent osseous metastases, including   T5-T6 left central disc protrusion and T10-T11 right central disc   protrusion (disc herniations) that cause ventral cord deformity    < end of copied text >        Allergies    No Known Allergies    Intolerances        ROS: [  ] Fever  [  ] Chills  [  ]Chest Pain [  ] SOB  [  ]Cough [  ] N/V  [  ] Diarrhea [  ]Constipation [  ]Other ROS:  [  ] ROS otherwise negative    PAST MEDICAL & SURGICAL HISTORY:  CAD (coronary artery disease)    Gout    Chronic obstructive pulmonary disease (COPD)    Hypothyroidism    GERD (gastroesophageal reflux disease)    No significant past surgical history        FAMILY HISTORY:  Family hx of lung cancer (Mother)        MEDICATIONS  (STANDING):  albuterol/ipratropium for Nebulization 3 milliLiter(s) Nebulizer every 12 hours  allopurinol 100 milliGRAM(s) Oral daily  aspirin  chewable 81 milliGRAM(s) Oral daily  atorvastatin 80 milliGRAM(s) Oral at bedtime  bisacodyl 5 milliGRAM(s) Oral at bedtime  budesonide 160 MICROgram(s)/formoterol 4.5 MICROgram(s) Inhaler 2 Puff(s) Inhalation two times a day  buPROPion XL (24-Hour) . 150 milliGRAM(s) Oral daily  dexAMETHasone     Tablet 4 milliGRAM(s) Oral every 6 hours  dextrose 5%. 1000 milliLiter(s) (100 mL/Hr) IV Continuous <Continuous>  dextrose 5%. 1000 milliLiter(s) (50 mL/Hr) IV Continuous <Continuous>  dextrose 50% Injectable 25 Gram(s) IV Push once  dextrose 50% Injectable 12.5 Gram(s) IV Push once  dextrose 50% Injectable 25 Gram(s) IV Push once  enoxaparin Injectable 40 milliGRAM(s) SubCutaneous every 24 hours  fluticasone propionate 50 MICROgram(s)/spray Nasal Spray 1 Spray(s) Both Nostrils two times a day  gabapentin 300 milliGRAM(s) Oral every 12 hours  glucagon  Injectable 1 milliGRAM(s) IntraMuscular once  haloperidol    Injectable 2.5 milliGRAM(s) IntraMuscular once  influenza  Vaccine (HIGH DOSE) 0.7 milliLiter(s) IntraMuscular once  insulin glargine Injectable (LANTUS) 23 Unit(s) SubCutaneous at bedtime  insulin lispro (ADMELOG) corrective regimen sliding scale   SubCutaneous three times a day before meals  insulin lispro (ADMELOG) corrective regimen sliding scale   SubCutaneous at bedtime  insulin lispro Injectable (ADMELOG) 11 Unit(s) SubCutaneous three times a day before meals  levothyroxine 125 MICROGram(s) Oral daily  lidocaine   4% Patch 1 Patch Transdermal daily  lidocaine 2% (Preservative-free) Injectable 20 milliLiter(s) Local Injection once  LORazepam     Tablet 0.5 milliGRAM(s) Oral once  nicotine - 21 mG/24Hr(s) Patch 1 Patch Transdermal daily  polyethylene glycol 3350 17 Gram(s) Oral daily  senna 2 Tablet(s) Oral at bedtime  tamsulosin 0.4 milliGRAM(s) Oral two times a day  traZODone 150 milliGRAM(s) Oral at bedtime    MEDICATIONS  (PRN):  acetaminophen     Tablet .. 650 milliGRAM(s) Oral every 6 hours PRN Temp greater or equal to 38.5C (101.3F), Mild Pain (1 - 3), Moderate Pain (4 - 6)  ALBUTerol    90 MICROgram(s) HFA Inhaler 2 Puff(s) Inhalation every 6 hours PRN Shortness of Breath and/or Wheezing  aluminum hydroxide/magnesium hydroxide/simethicone Suspension 30 milliLiter(s) Oral every 4 hours PRN Dyspepsia  dextrose Oral Gel 15 Gram(s) Oral once PRN Blood Glucose LESS THAN 70 milliGRAM(s)/deciliter  guaiFENesin Oral Liquid (Sugar-Free) 100 milliGRAM(s) Oral every 6 hours PRN Cough  HYDROmorphone   Tablet 4 milliGRAM(s) Oral every 3 hours PRN Moderate Pain (4 - 6)  HYDROmorphone  Injectable 1 milliGRAM(s) IV Push every 3 hours PRN Severe Pain (7 - 10)      PHYSICAL EXAM  Vital Signs Last 24 Hrs  T(C): 36.4 (18 Oct 2022 05:48), Max: 36.7 (17 Oct 2022 21:28)  T(F): 97.6 (18 Oct 2022 05:48), Max: 98 (17 Oct 2022 21:28)  HR: 86 (18 Oct 2022 05:48) (85 - 98)  BP: 129/77 (18 Oct 2022 05:48) (110/84 - 129/77)  BP(mean): --  RR: 18 (18 Oct 2022 05:48) (17 - 18)  SpO2: 96% (18 Oct 2022 05:48) (96% - 98%)    Parameters below as of 18 Oct 2022 05:48  Patient On (Oxygen Delivery Method): room air    p/E:     unable to examine, was taken to MICU for emergency, code.         IMAGING/LABS/PATHOLOGY: I have personally reviewed the relevant labs, pathology, and imaging as noted in the HPI.  In addition,                          13.6   13.59 )-----------( 161      ( 18 Oct 2022 05:42 )             40.9     10-18    129<L>  |  89<L>  |  37<H>  ----------------------------<  173<H>  5.3   |  28  |  1.12    Ca    9.2      18 Oct 2022 05:42  Phos  4.0     10-  Mg     1.90     10-18    TPro  8.4<H>  /  Alb  3.4  /  TBili  0.6  /  DBili  x   /  AST  108<H>  /  ALT  60<H>  /  AlkPhos  58  10-18        ASSESSMENT/PLAN    DALIA ESTRADA is a 67y man with no biopsy proven cancer but myeloma is suspected, comes to the ED on 10/11/22 from an Assisted Living Facility c/o mid back pains to low back pains radiating to   his chest.  He had a fall 3 months ago.  MRI shows cord involvement at T5, T7-T8 and T10 and he is planned for surgical decompression/fusion prior to adjuvant RT.  While in the OR, biopsies can be taken  so no IR guided biopsy is necessary for suspected myeloma.    Will continue to follow and based on NSG recommendations will likely need adjuvant RT about 3 weeks post operative which can be done as an outpatient at [facility].   Also, needs to f/u with Dr. Simental- oncologist at [facility].     ** addendum: was unavailable to be seen, taken to ICU, Coded, patient .

## 2022-10-18 NOTE — PROGRESS NOTE ADULT - PROVIDER SPECIALTY LIST ADULT
Palliative Care
Neurosurgery
Internal Medicine

## 2022-10-18 NOTE — CHART NOTE - NSCHARTNOTEFT_GEN_A_CORE
DEATH NOTE    Called to bedside to evaluate the patient for bradycardia in spite of pressors.     On physical exam, patient did not respond to verbal or noxious stimuli.  No spontaneous respirations.  Absent heart and breath sounds.  Absent radial and carotid pulses.   Pupils are fixed and dilated, no corneal reflex.  EKG rhythm strip shows asystole. POCUS showed no heart movement. Patient pronounced dead at 09:50. Attending notified.  Family declined autopsy.      Nick Potter MD   Internal Medicine PGY3

## 2022-10-18 NOTE — PROGRESS NOTE ADULT - PROBLEM SELECTOR PLAN 1
#Concern for multiple myeloma (LARISA, Lytic bone lesions, mildly elevated calcium)  Continue Monitoring for Red Flag Signs  Kappa/Lambda elevated  Decadron 4q6  MRI shows signs of cord manipulation from lesions; T5-6 and T10-11  RadOnc is aware; re-consult when Biopsy Results    Appreciate NSGY Recs      pain: 4mg PO Dilaudid q3h, 1mg IV

## 2022-10-18 NOTE — CONSULT NOTE ADULT - CONSULT REASON
Complex symptom management
T-5 metastatic lesion with extension to spinal canal
spine metastases
MM r/o

## 2022-10-18 NOTE — DISCHARGE NOTE FOR THE EXPIRED PATIENT - HOSPITAL COURSE
Patient is a 67 YOM with hx of CABG s/p PCIx3, 48 PY Smoking hx, stroke (25ya, w/ residual left sided facial weakness), COPD, GERD, gout, hypothyroidism here for 5 days of worsening radiating upper back pain. Patient has known lesions within the thoracic spine that was supposed to be biopsied several months ago, but was lost to followup. He is now admitted for further workup of back pain.    Hospital Course  Patient found to have expansion of thoracic lesions with signs concerning for cord compression, but lacked any red flag signs. Patient seen by NSGY team and assessed to have no need for acute intervention. Hematology consulted for high suspicion of MM with elevated kappa/lambda ratio; with BM Bx was performed on 10/13/22 which was insufficient. On 10/19 subsequent biopsy of thoracic lesions were performed.     RRT called today for AMS concern for in the setting of possible opioid or benzodiazepines vs. metabolic encephalopathy. The patient was found agitated and foaming at the mouth. He was given narcan and became agitated and subsequently recieved haldol due to combative behavior. The patient then went into PEA arrest and ACLS protocol was initiated. At which time, ROSC was obtained however he went back into PEA arrest. ROSC obtained a second time. On a third occasion prior to transport patient underwent PEA arrest. ROSC achieved a third  time. Noted to be in wide complex tachycardia consistent with Vtach with a stable blood pressure albeit on pressers. The patient was cardioverted and loaded with amiodarone 150mg. His blood pressure and all vitals stabilized. He was transferred to the MICU.     In the [facility] to bedside to evaluate the patient for bradycardia and hypotension in spite of pressors.     On physical exam, patient did not respond to verbal or noxious stimuli.  No spontaneous respirations.  Absent heart and breath sounds.  Absent radial and carotid pulses.   Pupils are fixed and dilated, no corneal reflex.  EKG rhythm strip shows asystole. POCUS showed no heart movement. Patient pronounced dead at 09:50. Attending notified.  Family declined autopsy.

## 2022-10-18 NOTE — PROGRESS NOTE ADULT - PROBLEM SELECTOR PLAN 5
Patient is on Flomax 0.4mg BID  prostate WNL on CT Scan  PSA; normal    F/U Bladder Scan    Watch for acute worsening given signs of cord compression on imaging

## 2022-10-19 LAB — INTERPRETATION SERPL IFE-IMP: SIGNIFICANT CHANGE UP

## 2022-10-20 LAB
% GAMMA, URINE: PRESENT
ALBUMIN 24H MFR UR ELPH: PRESENT
ALPHA1 GLOB 24H MFR UR ELPH: SIGNIFICANT CHANGE UP
ALPHA2 GLOB 24H MFR UR ELPH: SIGNIFICANT CHANGE UP
B-GLOBULIN 24H MFR UR ELPH: PRESENT
INTERPRETATION 24H UR IFE-IMP: SIGNIFICANT CHANGE UP
INTERPRETATION 24H UR IFE-IMP: SIGNIFICANT CHANGE UP
M PROTEIN 24H UR ELPH-MRATE: PRESENT — SIGNIFICANT CHANGE UP
PROT ?TM UR-MCNC: 13 MG/DL — SIGNIFICANT CHANGE UP
PROT PATTERN 24H UR ELPH-IMP: SIGNIFICANT CHANGE UP

## 2022-10-24 LAB — CHROM ANALY OVERALL INTERP SPEC-IMP: SIGNIFICANT CHANGE UP

## 2024-11-19 NOTE — ED PROVIDER NOTE - WR ORDER ID 1
Patient: Ruby Leonard [[id number]]     Procedure: OPEN ACCESS COLONOSCOPY Status: Needs Scheduling   Requested appt date:  Authorizing: Deborah Adorno DO in [facility]           Priority: Routine               Diagnosis: Colon cancer screening [Z12.11]        [id number]
